# Patient Record
Sex: FEMALE | Race: WHITE | NOT HISPANIC OR LATINO | URBAN - METROPOLITAN AREA
[De-identification: names, ages, dates, MRNs, and addresses within clinical notes are randomized per-mention and may not be internally consistent; named-entity substitution may affect disease eponyms.]

---

## 2019-05-26 ENCOUNTER — INPATIENT (INPATIENT)
Facility: HOSPITAL | Age: 84
LOS: 8 days | Discharge: NURSING, REHAB OR LONG-TERM CARE | End: 2019-06-04
Attending: FAMILY MEDICINE | Admitting: FAMILY MEDICINE
Payer: MEDICARE

## 2019-05-26 VITALS
HEIGHT: 63 IN | SYSTOLIC BLOOD PRESSURE: 126 MMHG | WEIGHT: 100.09 LBS | RESPIRATION RATE: 19 BRPM | DIASTOLIC BLOOD PRESSURE: 72 MMHG | HEART RATE: 119 BPM | OXYGEN SATURATION: 100 %

## 2019-05-26 LAB
ALBUMIN SERPL ELPH-MCNC: 3.8 G/DL — SIGNIFICANT CHANGE UP (ref 3.3–5)
ALP SERPL-CCNC: 69 U/L — SIGNIFICANT CHANGE UP (ref 40–120)
ALT FLD-CCNC: 13 U/L — SIGNIFICANT CHANGE UP (ref 12–78)
ANION GAP SERPL CALC-SCNC: 9 MMOL/L — SIGNIFICANT CHANGE UP (ref 5–17)
APTT BLD: 30 SEC — SIGNIFICANT CHANGE UP (ref 27.5–36.3)
AST SERPL-CCNC: 22 U/L — SIGNIFICANT CHANGE UP (ref 15–37)
BASOPHILS # BLD AUTO: 0.08 K/UL — SIGNIFICANT CHANGE UP (ref 0–0.2)
BASOPHILS NFR BLD AUTO: 1.2 % — SIGNIFICANT CHANGE UP (ref 0–2)
BILIRUB SERPL-MCNC: 0.6 MG/DL — SIGNIFICANT CHANGE UP (ref 0.2–1.2)
BUN SERPL-MCNC: 24 MG/DL — HIGH (ref 7–23)
CALCIUM SERPL-MCNC: 9.6 MG/DL — SIGNIFICANT CHANGE UP (ref 8.5–10.1)
CHLORIDE SERPL-SCNC: 102 MMOL/L — SIGNIFICANT CHANGE UP (ref 96–108)
CO2 SERPL-SCNC: 27 MMOL/L — SIGNIFICANT CHANGE UP (ref 22–31)
CREAT SERPL-MCNC: 1.23 MG/DL — SIGNIFICANT CHANGE UP (ref 0.5–1.3)
EOSINOPHIL # BLD AUTO: 0.11 K/UL — SIGNIFICANT CHANGE UP (ref 0–0.5)
EOSINOPHIL NFR BLD AUTO: 1.6 % — SIGNIFICANT CHANGE UP (ref 0–6)
GLUCOSE SERPL-MCNC: 121 MG/DL — HIGH (ref 70–99)
HCT VFR BLD CALC: 38.1 % — SIGNIFICANT CHANGE UP (ref 34.5–45)
HGB BLD-MCNC: 13 G/DL — SIGNIFICANT CHANGE UP (ref 11.5–15.5)
IMM GRANULOCYTES NFR BLD AUTO: 0.4 % — SIGNIFICANT CHANGE UP (ref 0–1.5)
INR BLD: 1.05 RATIO — SIGNIFICANT CHANGE UP (ref 0.88–1.16)
LACTATE SERPL-SCNC: 1.1 MMOL/L — SIGNIFICANT CHANGE UP (ref 0.7–2)
LYMPHOCYTES # BLD AUTO: 2.67 K/UL — SIGNIFICANT CHANGE UP (ref 1–3.3)
LYMPHOCYTES # BLD AUTO: 39 % — SIGNIFICANT CHANGE UP (ref 13–44)
MCHC RBC-ENTMCNC: 31.5 PG — SIGNIFICANT CHANGE UP (ref 27–34)
MCHC RBC-ENTMCNC: 34.1 GM/DL — SIGNIFICANT CHANGE UP (ref 32–36)
MCV RBC AUTO: 92.3 FL — SIGNIFICANT CHANGE UP (ref 80–100)
MONOCYTES # BLD AUTO: 0.48 K/UL — SIGNIFICANT CHANGE UP (ref 0–0.9)
MONOCYTES NFR BLD AUTO: 7 % — SIGNIFICANT CHANGE UP (ref 2–14)
NEUTROPHILS # BLD AUTO: 3.47 K/UL — SIGNIFICANT CHANGE UP (ref 1.8–7.4)
NEUTROPHILS NFR BLD AUTO: 50.8 % — SIGNIFICANT CHANGE UP (ref 43–77)
PLATELET # BLD AUTO: 219 K/UL — SIGNIFICANT CHANGE UP (ref 150–400)
POTASSIUM SERPL-MCNC: 3.8 MMOL/L — SIGNIFICANT CHANGE UP (ref 3.5–5.3)
POTASSIUM SERPL-SCNC: 3.8 MMOL/L — SIGNIFICANT CHANGE UP (ref 3.5–5.3)
PROT SERPL-MCNC: 7.9 GM/DL — SIGNIFICANT CHANGE UP (ref 6–8.3)
PROTHROM AB SERPL-ACNC: 11.7 SEC — SIGNIFICANT CHANGE UP (ref 10–12.9)
RBC # BLD: 4.13 M/UL — SIGNIFICANT CHANGE UP (ref 3.8–5.2)
RBC # FLD: 14.1 % — SIGNIFICANT CHANGE UP (ref 10.3–14.5)
SODIUM SERPL-SCNC: 138 MMOL/L — SIGNIFICANT CHANGE UP (ref 135–145)
WBC # BLD: 6.84 K/UL — SIGNIFICANT CHANGE UP (ref 3.8–10.5)
WBC # FLD AUTO: 6.84 K/UL — SIGNIFICANT CHANGE UP (ref 3.8–10.5)

## 2019-05-26 PROCEDURE — 99285 EMERGENCY DEPT VISIT HI MDM: CPT

## 2019-05-26 PROCEDURE — 71045 X-RAY EXAM CHEST 1 VIEW: CPT | Mod: 26

## 2019-05-26 PROCEDURE — 71250 CT THORAX DX C-: CPT | Mod: 26

## 2019-05-26 PROCEDURE — 93010 ELECTROCARDIOGRAM REPORT: CPT

## 2019-05-26 RX ORDER — LANOLIN ALCOHOL/MO/W.PET/CERES
5 CREAM (GRAM) TOPICAL AT BEDTIME
Refills: 0 | Status: DISCONTINUED | OUTPATIENT
Start: 2019-05-26 | End: 2019-06-04

## 2019-05-26 RX ORDER — ONDANSETRON 8 MG/1
4 TABLET, FILM COATED ORAL EVERY 6 HOURS
Refills: 0 | Status: DISCONTINUED | OUTPATIENT
Start: 2019-05-26 | End: 2019-06-04

## 2019-05-26 RX ORDER — IPRATROPIUM/ALBUTEROL SULFATE 18-103MCG
3 AEROSOL WITH ADAPTER (GRAM) INHALATION EVERY 6 HOURS
Refills: 0 | Status: DISCONTINUED | OUTPATIENT
Start: 2019-05-26 | End: 2019-05-31

## 2019-05-26 RX ORDER — DOCUSATE SODIUM 100 MG
100 CAPSULE ORAL THREE TIMES A DAY
Refills: 0 | Status: DISCONTINUED | OUTPATIENT
Start: 2019-05-26 | End: 2019-05-27

## 2019-05-26 RX ORDER — IPRATROPIUM/ALBUTEROL SULFATE 18-103MCG
3 AEROSOL WITH ADAPTER (GRAM) INHALATION ONCE
Refills: 0 | Status: COMPLETED | OUTPATIENT
Start: 2019-05-26 | End: 2019-05-26

## 2019-05-26 RX ORDER — CEFTRIAXONE 500 MG/1
1000 INJECTION, POWDER, FOR SOLUTION INTRAMUSCULAR; INTRAVENOUS ONCE
Refills: 0 | Status: COMPLETED | OUTPATIENT
Start: 2019-05-26 | End: 2019-05-26

## 2019-05-26 RX ORDER — ENOXAPARIN SODIUM 100 MG/ML
30 INJECTION SUBCUTANEOUS EVERY 24 HOURS
Refills: 0 | Status: DISCONTINUED | OUTPATIENT
Start: 2019-05-26 | End: 2019-06-04

## 2019-05-26 RX ORDER — IPRATROPIUM/ALBUTEROL SULFATE 18-103MCG
3 AEROSOL WITH ADAPTER (GRAM) INHALATION ONCE
Refills: 0 | Status: DISCONTINUED | OUTPATIENT
Start: 2019-05-26 | End: 2019-05-26

## 2019-05-26 RX ORDER — AZITHROMYCIN 500 MG/1
500 TABLET, FILM COATED ORAL ONCE
Refills: 0 | Status: COMPLETED | OUTPATIENT
Start: 2019-05-26 | End: 2019-05-26

## 2019-05-26 RX ORDER — BUDESONIDE, MICRONIZED 100 %
0.5 POWDER (GRAM) MISCELLANEOUS
Refills: 0 | Status: DISCONTINUED | OUTPATIENT
Start: 2019-05-26 | End: 2019-06-03

## 2019-05-26 RX ADMIN — Medication 80 MILLIGRAM(S): at 13:46

## 2019-05-26 RX ADMIN — Medication 3 MILLILITER(S): at 13:46

## 2019-05-26 RX ADMIN — Medication 40 MILLIGRAM(S): at 23:18

## 2019-05-26 RX ADMIN — CEFTRIAXONE 1000 MILLIGRAM(S): 500 INJECTION, POWDER, FOR SOLUTION INTRAMUSCULAR; INTRAVENOUS at 13:46

## 2019-05-26 RX ADMIN — ENOXAPARIN SODIUM 30 MILLIGRAM(S): 100 INJECTION SUBCUTANEOUS at 17:10

## 2019-05-26 RX ADMIN — Medication 40 MILLIGRAM(S): at 17:11

## 2019-05-26 RX ADMIN — Medication 0.5 MILLIGRAM(S): at 20:48

## 2019-05-26 RX ADMIN — AZITHROMYCIN 255 MILLIGRAM(S): 500 TABLET, FILM COATED ORAL at 14:15

## 2019-05-26 RX ADMIN — Medication 3 MILLILITER(S): at 20:48

## 2019-05-26 NOTE — H&P ADULT - NSICDXPASTMEDICALHX_GEN_ALL_CORE_FT
PAST MEDICAL HISTORY:  Benign essential tremor     COPD (chronic obstructive pulmonary disease)     Glaucoma     Hypertension     Osteoarthritis

## 2019-05-26 NOTE — ED PROVIDER NOTE - OBJECTIVE STATEMENT
89 y/o F PMHx of COPD, HTN, former smoker presents to ED BIBA from home worsening SOB with frequent coughing. No home O2. Associated generalized weakness, exacerbated by mild exertion. No associated f/c, CP, n/v, abd pain, loss of appetite, orthopnea. Past h/o ankle swelling, not recently. Pt on Lasix. Allergic to sulfa. PCP: Love [facility].

## 2019-05-26 NOTE — ED PROVIDER NOTE - CLINICAL SUMMARY MEDICAL DECISION MAKING FREE TEXT BOX
Elderly female former smoker + COPD BIBA with worsening cough, SOB. Abnormal lung exam. Plan rectal temp; CXR; labs including blood cultures, lactate, Coags; DuoNebs; IV steroids. CXR suggestive of right lower lobe infiltrate, IV abx for CAP order. Pt hypoxic upon ED arrival, admit to hospitalist with CT chest non-con.

## 2019-05-26 NOTE — H&P ADULT - HISTORY OF PRESENT ILLNESS
89 y/o F PMHx of COPD, HTN, former smoker presents to ED BIBA from home worsening SOB with frequent coughing. 89 y/o F PMHx of COPD, HTN, former smoker presents to ED BIBA from home worsening SOB with frequent coughing.  Patient states she was diagnosed with copd many years ago, but she has not had any flare up for many years and has been stable. She states she has a cough , non productive that will not linda.  Patient was hypoxic in ER mid 80s on arrival. She was given supplemntal neb, steroid, oxygen in ER which releived some of her symptoms. She denies any fevers, chills, n/v/d.

## 2019-05-26 NOTE — H&P ADULT - NSHPPHYSICALEXAM_GEN_ALL_CORE
Vital Signs Last 24 Hrs  T(C): 36.7 (26 May 2019 21:19), Max: 36.9 (26 May 2019 14:15)  T(F): 98.1 (26 May 2019 21:19), Max: 98.5 (26 May 2019 14:32)  HR: 98 (26 May 2019 21:19) (76 - 119)  BP: 117/48 (26 May 2019 21:19) (117/48 - 186/72)  BP(mean): --  RR: 18 (26 May 2019 21:19) (16 - 21)  SpO2: 95% (26 May 2019 21:19) (89% - 100%)    HEENT:   pupils equal and reactive, EOMI, no oropharyngeal lesions, erythema, exudates, oral thrush    NECK:   supple, no carotid bruits, no palpable lymph nodes, no thyromegaly    CV:  +S1, +S2, regular, no murmurs or rubs    RESP:   lungs clear to auscultation bilaterally, no wheezing, rales, rhonchi, good air entry bilaterally    BREAST:  not examined    GI:  abdomen soft, non-tender, non-distended, normal BS, no bruits, no abdominal masses, no palpable masses    RECTAL:  not examined    :  not examined    MSK:   normal muscle tone, no atrophy, no rigidity, no contractions    EXT:   no clubbing, no cyanosis, no edema, no calf pain, swelling or erythema    VASCULAR:  pulses equal and symmetric in the upper and lower extremities    NEURO:  AAOX3, no focal neurological deficits, follows all commands, able to move extremities spontaneously    SKIN:  no ulcers, lesions or rashes

## 2019-05-26 NOTE — ED PROVIDER NOTE - CONSTITUTIONAL, MLM
normal... Elderly white female, alert, mild respiratory discomfort, no sentence shortening, mildly ill appearing but non-toxic.

## 2019-05-26 NOTE — ED PROVIDER NOTE - PMH
Benign essential tremor    COPD (chronic obstructive pulmonary disease)    Glaucoma    Hypertension    Osteoarthritis

## 2019-05-26 NOTE — ED ADULT NURSE NOTE - OBJECTIVE STATEMENT
Pt complains of worsening SOB and cough x 2 weeks, denies fevers.  EKG done on arrival.   Cardiac and VS monitoring initiated.  20g PIV placed in LAC.  Moist nonproductive cougn.

## 2019-05-26 NOTE — H&P ADULT - NSICDXPASTSURGICALHX_GEN_ALL_CORE_FT
PAST SURGICAL HISTORY:  Ankle injury     Dislocated intraocular lens right eye    Glaucoma filtering bleb of both eyes

## 2019-05-26 NOTE — H&P ADULT - NSHPLABSRESULTS_GEN_ALL_CORE
26 May 2019 11:16    138    |  102    |  24     ----------------------------<  121    3.8     |  27     |  1.23     Ca    9.6        26 May 2019 11:16    TPro  7.9    /  Alb  3.8    /  TBili  0.6    /  DBili  x      /  AST  22     /  ALT  13     /  AlkPhos  69     26 May 2019 11:16  LIVER FUNCTIONS - ( 26 May 2019 11:16 )  Alb: 3.8 g/dL / Pro: 7.9 gm/dL / ALK PHOS: 69 U/L / ALT: 13 U/L / AST: 22 U/L / GGT: x         PT/INR - ( 26 May 2019 11:16 )   PT: 11.7 sec;   INR: 1.05 ratio         PTT - ( 26 May 2019 11:16 )  PTT:30.0 secCBC Full  -  ( 26 May 2019 11:16 )  WBC Count : 6.84 K/uL  Hemoglobin : 13.0 g/dL  Hematocrit : 38.1 %  Platelet Count - Automated : 219 K/uL  Mean Cell Volume : 92.3 fl  Mean Cell Hemoglobin : 31.5 pg  Mean Cell Hemoglobin Concentration : 34.1 gm/dL  Auto Neutrophil # : 3.47 K/uL  Auto Lymphocyte # : 2.67 K/uL  Auto Monocyte # : 0.48 K/uL  Auto Eosinophil # : 0.11 K/uL  Auto Basophil # : 0.08 K/uL  Auto Neutrophil % : 50.8 %  Auto Lymphocyte % : 39.0 %  Auto Monocyte % : 7.0 %  Auto Eosinophil % : 1.6 %  Auto Basophil % : 1.2 %

## 2019-05-26 NOTE — ED ADULT NURSE NOTE - NSIMPLEMENTINTERV_GEN_ALL_ED
Implemented All Fall with Harm Risk Interventions:  [place] to call system. Call bell, personal items and telephone within reach. Instruct patient to call for assistance. Room bathroom lighting operational. Non-slip footwear when patient is off stretcher. Physically safe environment: no spills, clutter or unnecessary equipment. Stretcher in lowest position, wheels locked, appropriate side rails in place. Provide visual cue, wrist band, yellow gown, etc. Monitor gait and stability. Monitor for mental status changes and reorient to person, place, and time. Review medications for side effects contributing to fall risk. Reinforce activity limits and safety measures with patient and family. Provide visual clues: red socks.

## 2019-05-26 NOTE — ED PROVIDER NOTE - CARE PLAN
Principal Discharge DX:	CAP (community acquired pneumonia)  Secondary Diagnosis:	COPD exacerbation  Secondary Diagnosis:	Hypoxia

## 2019-05-26 NOTE — H&P ADULT - ASSESSMENT
89 y/o F PMHx of COPD, HTN, former smoker presents to ED BIBA from home worsening SOB with frequent coughing.      Found to be in acute respiratory distress and acute hypoxic resp failure.     Admit for acute exacerbation of copd with acute hypoxic resp failure  -start solumederol 40 q6h  -start duonebs every 6  -pulmicort    DVT prophy- sc heparin 89 y/o F PMHx of COPD, HTN, former smoker presents to ED BIBA from home worsening SOB with frequent coughing.  Patient states she was diagnosed with copd many years ago, but she has not had any flare up for many years and has been stable. She states she has a cough , non productive that will not linda.  Patient was hypoxic in ER mid 80s on arrival. She was given supplemntal neb, steroid, oxygen in ER which releived some of her symptoms. She denies any fevers, chills, n/v/d.    Found to be in acute respiratory distress and acute hypoxic resp failure.     Admit for acute exacerbation of copd with acute hypoxic resp failure  -start solumederol 40 q6h  -start duonebs every 6  -pulmicort    DVT prophy- sc heparin

## 2019-05-27 LAB
ANION GAP SERPL CALC-SCNC: 12 MMOL/L — SIGNIFICANT CHANGE UP (ref 5–17)
BASOPHILS # BLD AUTO: 0.01 K/UL — SIGNIFICANT CHANGE UP (ref 0–0.2)
BASOPHILS NFR BLD AUTO: 0.2 % — SIGNIFICANT CHANGE UP (ref 0–2)
BUN SERPL-MCNC: 27 MG/DL — HIGH (ref 7–23)
CALCIUM SERPL-MCNC: 9.1 MG/DL — SIGNIFICANT CHANGE UP (ref 8.5–10.1)
CHLORIDE SERPL-SCNC: 103 MMOL/L — SIGNIFICANT CHANGE UP (ref 96–108)
CO2 SERPL-SCNC: 23 MMOL/L — SIGNIFICANT CHANGE UP (ref 22–31)
CREAT SERPL-MCNC: 1.12 MG/DL — SIGNIFICANT CHANGE UP (ref 0.5–1.3)
EOSINOPHIL # BLD AUTO: 0 K/UL — SIGNIFICANT CHANGE UP (ref 0–0.5)
EOSINOPHIL NFR BLD AUTO: 0 % — SIGNIFICANT CHANGE UP (ref 0–6)
GLUCOSE SERPL-MCNC: 183 MG/DL — HIGH (ref 70–99)
HCT VFR BLD CALC: 37 % — SIGNIFICANT CHANGE UP (ref 34.5–45)
HGB BLD-MCNC: 12.4 G/DL — SIGNIFICANT CHANGE UP (ref 11.5–15.5)
IMM GRANULOCYTES NFR BLD AUTO: 0.6 % — SIGNIFICANT CHANGE UP (ref 0–1.5)
LYMPHOCYTES # BLD AUTO: 1.3 K/UL — SIGNIFICANT CHANGE UP (ref 1–3.3)
LYMPHOCYTES # BLD AUTO: 24.8 % — SIGNIFICANT CHANGE UP (ref 13–44)
MCHC RBC-ENTMCNC: 31 PG — SIGNIFICANT CHANGE UP (ref 27–34)
MCHC RBC-ENTMCNC: 33.5 GM/DL — SIGNIFICANT CHANGE UP (ref 32–36)
MCV RBC AUTO: 92.5 FL — SIGNIFICANT CHANGE UP (ref 80–100)
MONOCYTES # BLD AUTO: 0.06 K/UL — SIGNIFICANT CHANGE UP (ref 0–0.9)
MONOCYTES NFR BLD AUTO: 1.1 % — LOW (ref 2–14)
NEUTROPHILS # BLD AUTO: 3.85 K/UL — SIGNIFICANT CHANGE UP (ref 1.8–7.4)
NEUTROPHILS NFR BLD AUTO: 73.3 % — SIGNIFICANT CHANGE UP (ref 43–77)
PLATELET # BLD AUTO: 201 K/UL — SIGNIFICANT CHANGE UP (ref 150–400)
POTASSIUM SERPL-MCNC: 3.6 MMOL/L — SIGNIFICANT CHANGE UP (ref 3.5–5.3)
POTASSIUM SERPL-SCNC: 3.6 MMOL/L — SIGNIFICANT CHANGE UP (ref 3.5–5.3)
RBC # BLD: 4 M/UL — SIGNIFICANT CHANGE UP (ref 3.8–5.2)
RBC # FLD: 14.1 % — SIGNIFICANT CHANGE UP (ref 10.3–14.5)
SODIUM SERPL-SCNC: 138 MMOL/L — SIGNIFICANT CHANGE UP (ref 135–145)
WBC # BLD: 5.25 K/UL — SIGNIFICANT CHANGE UP (ref 3.8–10.5)
WBC # FLD AUTO: 5.25 K/UL — SIGNIFICANT CHANGE UP (ref 3.8–10.5)

## 2019-05-27 RX ORDER — IPRATROPIUM/ALBUTEROL SULFATE 18-103MCG
3 AEROSOL WITH ADAPTER (GRAM) INHALATION ONCE
Refills: 0 | Status: COMPLETED | OUTPATIENT
Start: 2019-05-27 | End: 2019-05-27

## 2019-05-27 RX ADMIN — Medication 60 MILLIGRAM(S): at 17:57

## 2019-05-27 RX ADMIN — Medication 60 MILLIGRAM(S): at 23:32

## 2019-05-27 RX ADMIN — Medication 40 MILLIGRAM(S): at 05:13

## 2019-05-27 RX ADMIN — ENOXAPARIN SODIUM 30 MILLIGRAM(S): 100 INJECTION SUBCUTANEOUS at 17:57

## 2019-05-27 RX ADMIN — Medication 3 MILLILITER(S): at 01:50

## 2019-05-27 RX ADMIN — Medication 100 MILLIGRAM(S): at 05:13

## 2019-05-27 RX ADMIN — Medication 3 MILLILITER(S): at 14:08

## 2019-05-27 RX ADMIN — Medication 0.5 MILLIGRAM(S): at 08:03

## 2019-05-27 RX ADMIN — Medication 0.5 MILLIGRAM(S): at 21:02

## 2019-05-27 RX ADMIN — Medication 3 MILLILITER(S): at 08:03

## 2019-05-27 RX ADMIN — Medication 3 MILLILITER(S): at 05:40

## 2019-05-27 RX ADMIN — Medication 60 MILLIGRAM(S): at 12:04

## 2019-05-27 RX ADMIN — Medication 3 MILLILITER(S): at 21:02

## 2019-05-28 RX ADMIN — Medication 3 MILLILITER(S): at 13:44

## 2019-05-28 RX ADMIN — Medication 3 MILLILITER(S): at 09:33

## 2019-05-28 RX ADMIN — Medication 40 MILLIGRAM(S): at 21:38

## 2019-05-28 RX ADMIN — Medication 0.5 MILLIGRAM(S): at 20:10

## 2019-05-28 RX ADMIN — Medication 60 MILLIGRAM(S): at 05:40

## 2019-05-28 RX ADMIN — Medication 0.5 MILLIGRAM(S): at 09:33

## 2019-05-28 RX ADMIN — ENOXAPARIN SODIUM 30 MILLIGRAM(S): 100 INJECTION SUBCUTANEOUS at 17:24

## 2019-05-28 RX ADMIN — Medication 3 MILLILITER(S): at 20:10

## 2019-05-28 RX ADMIN — Medication 60 MILLIGRAM(S): at 12:08

## 2019-05-29 RX ADMIN — Medication 200 MILLIGRAM(S): at 05:38

## 2019-05-29 RX ADMIN — Medication 200 MILLIGRAM(S): at 23:58

## 2019-05-29 RX ADMIN — Medication 40 MILLIGRAM(S): at 05:38

## 2019-05-29 RX ADMIN — ENOXAPARIN SODIUM 30 MILLIGRAM(S): 100 INJECTION SUBCUTANEOUS at 17:56

## 2019-05-29 RX ADMIN — Medication 200 MILLIGRAM(S): at 17:56

## 2019-05-29 RX ADMIN — Medication 0.5 MILLIGRAM(S): at 20:14

## 2019-05-29 RX ADMIN — Medication 40 MILLIGRAM(S): at 13:53

## 2019-05-29 RX ADMIN — Medication 3 MILLILITER(S): at 20:15

## 2019-05-29 RX ADMIN — Medication 200 MILLIGRAM(S): at 11:57

## 2019-05-29 NOTE — PHYSICAL THERAPY INITIAL EVALUATION ADULT - ADDITIONAL COMMENTS
pt has few steps to enter home with HR, she has never used an assistive device and still drives.She has a RW and a cane in home issued to  prior to his death in 2003

## 2019-05-29 NOTE — PHYSICAL THERAPY INITIAL EVALUATION ADULT - GENERAL OBSERVATIONS, REHAB EVAL
pt resting supine in bed with nasal o2 3L/min ,reports R leg treated for weeks with UNNA boot to heal medial distal ulcer,now using moisturizing lotion to area ; awake,alert,,Ox3 ,hands with ruborous color,,arthritic changes DIP joints B hands ,joints jt with finger flexion during gripping skills

## 2019-05-29 NOTE — PHYSICAL THERAPY INITIAL EVALUATION ADULT - PATIENT PROFILE REVIEW, REHAB EVAL
former smoker,diagnosed with COPD in remote past without h/o flare ups/yes yes/former smoker, diagnosed with COPD in remote past without h/o flare ups

## 2019-05-29 NOTE — DIETITIAN INITIAL EVALUATION ADULT. - OTHER INFO
Pt seen for Low BMI. Pt states she has maintained her UBW since she was  (100lbs). Pt states she is around 92-95, with no sudden changes. Pt pmhx noted for glaucoma, Osteoarthritis, Essential tremor, COPD, HTN. Pt states she has a very good appetite and eats three meals a day. Pt states few days before admission appetite was low, but overall has been good. Pt denies chewing and swallowing difficulty. Denies n/v/d/c. Pt jose 19, no edema, scab on right shin. Pt overall in good nutrition status. Pt states she takes vitamin d at home. RTecommend c/w current diet and plan. Will monitor pt as needed. of note pt classified as underweight.

## 2019-05-29 NOTE — PROVIDER CONTACT NOTE (OTHER) - SITUATION
Called office spoke to Karthik.  will be made aware of consult
Called service spoke to Jenny for admit courtesy call for PCP.

## 2019-05-29 NOTE — PHYSICAL THERAPY INITIAL EVALUATION ADULT - DIAGNOSIS, PT EVAL
acute hypoxic respiratory failure,acute exacerbation of COPD acute hypoxic respiratory failure, acute exacerbation of COPD, arthritis B hands

## 2019-05-29 NOTE — DIETITIAN INITIAL EVALUATION ADULT. - PERTINENT MEDS FT
MEDICATIONS  (STANDING):  ALBUTerol/ipratropium for Nebulization 3 milliLiter(s) Nebulizer every 6 hours  buDESOnide   0.5 milliGRAM(s) Respule 0.5 milliGRAM(s) Inhalation two times a day  enoxaparin Injectable 30 milliGRAM(s) SubCutaneous every 24 hours  guaiFENesin    Syrup 200 milliGRAM(s) Oral every 6 hours  methylPREDNISolone sodium succinate Injectable 40 milliGRAM(s) IV Push every 8 hours    MEDICATIONS  (PRN):  melatonin 5 milliGRAM(s) Oral at bedtime PRN Sleep  ondansetron Injectable 4 milliGRAM(s) IV Push every 6 hours PRN Nausea

## 2019-05-29 NOTE — PHYSICAL THERAPY INITIAL EVALUATION ADULT - CRITERIA FOR SKILLED THERAPEUTIC INTERVENTIONS
functional limitations in following categories/rehab potential/therapy frequency/impairments found/anticipated discharge recommendation/risk reduction/prevention/predicted duration of therapy intervention/anticipated equipment needs at discharge

## 2019-05-29 NOTE — CHART NOTE - NSCHARTNOTEFT_GEN_A_CORE
Upon Nutritional Assessment by the Registered Dietitian your patient was determined to meet criteria / has evidence of the following diagnosis/diagnoses:          [ ]  Mild Protein Calorie Malnutrition        [ ]  Moderate Protein Calorie Malnutrition        [ ] Severe Protein Calorie Malnutrition        [ ] Unspecified Protein Calorie Malnutrition        [x] Underweight / BMI <19        [ ] Morbid Obesity / BMI > 40      Findings as based on:  •  Comprehensive nutrition assessment and consultation  •  Calorie counts (nutrient intake analysis)  •  Food acceptance and intake status from observations by staff  •  Follow up  •  Patient education  •  Intervention secondary to interdisciplinary rounds  •   concerns      Treatment:    The following diet has been recommended:  C/w Current Nutrition Care Plan      PROVIDER Section:     By signing this assessment you are acknowledging and agree with the diagnosis/diagnoses assigned by the Registered Dietitian    Comments:

## 2019-05-29 NOTE — PHYSICAL THERAPY INITIAL EVALUATION ADULT - PERTINENT HX OF CURRENT PROBLEM, REHAB EVAL
increased shortness of breath,chronic non-productive cough that won't linda,hypoxic to mid 80's on room air

## 2019-05-30 RX ORDER — AZITHROMYCIN 500 MG/1
500 TABLET, FILM COATED ORAL DAILY
Refills: 0 | Status: DISCONTINUED | OUTPATIENT
Start: 2019-05-30 | End: 2019-06-04

## 2019-05-30 RX ADMIN — ENOXAPARIN SODIUM 30 MILLIGRAM(S): 100 INJECTION SUBCUTANEOUS at 18:51

## 2019-05-30 RX ADMIN — Medication 200 MILLIGRAM(S): at 10:01

## 2019-05-30 RX ADMIN — Medication 3 MILLILITER(S): at 20:19

## 2019-05-30 RX ADMIN — AZITHROMYCIN 500 MILLIGRAM(S): 500 TABLET, FILM COATED ORAL at 10:00

## 2019-05-30 RX ADMIN — Medication 40 MILLIGRAM(S): at 05:45

## 2019-05-30 RX ADMIN — Medication 200 MILLIGRAM(S): at 18:34

## 2019-05-30 RX ADMIN — Medication 40 MILLIGRAM(S): at 18:33

## 2019-05-30 RX ADMIN — Medication 3 MILLILITER(S): at 14:03

## 2019-05-30 RX ADMIN — Medication 0.5 MILLIGRAM(S): at 20:19

## 2019-05-30 RX ADMIN — Medication 3 MILLILITER(S): at 08:28

## 2019-05-30 RX ADMIN — Medication 0.5 MILLIGRAM(S): at 08:28

## 2019-05-30 NOTE — CONSULT NOTE ADULT - ASSESSMENT
Patient is seen and full consult dictated     - copd with symptoms of exacerbation   - hypoxia secondary to copd exacerbation   - emphysema noted in the ct scan of chest   - No gross pneumonia noted in the ct scan of the chest   - mild hyperglycemia with the steroids   - history of hiatal and diaphragmatic hernia     PLAN     - continue with the solumedrol and taper down to po prednisone as the patient improves from tomorrow   - add azithromycin as an immuno modulatory agent 500 mg po daily for 3 days  with the complicated  copd and advanced age that required hospitalization   - continue with the Pulmicort during the hospital stay . and use of advair and spiriva as an out patient her baseline medications   - check the sat on the room air and with the ambulation for the need of the home 02 therapy   - symptomatic relief for the cough   - monitor BGM and  hyperglycemia likely related with the steroids

## 2019-05-31 ENCOUNTER — TRANSCRIPTION ENCOUNTER (OUTPATIENT)
Age: 84
End: 2019-05-31

## 2019-05-31 LAB
CULTURE RESULTS: SIGNIFICANT CHANGE UP
CULTURE RESULTS: SIGNIFICANT CHANGE UP
SPECIMEN SOURCE: SIGNIFICANT CHANGE UP
SPECIMEN SOURCE: SIGNIFICANT CHANGE UP

## 2019-05-31 PROCEDURE — 93010 ELECTROCARDIOGRAM REPORT: CPT

## 2019-05-31 PROCEDURE — 71045 X-RAY EXAM CHEST 1 VIEW: CPT | Mod: 26

## 2019-05-31 RX ORDER — ALBUTEROL 90 UG/1
2.5 AEROSOL, METERED ORAL EVERY 6 HOURS
Refills: 0 | Status: DISCONTINUED | OUTPATIENT
Start: 2019-05-31 | End: 2019-05-31

## 2019-05-31 RX ORDER — ACETYLCYSTEINE 200 MG/ML
4 VIAL (ML) MISCELLANEOUS EVERY 6 HOURS
Refills: 0 | Status: DISCONTINUED | OUTPATIENT
Start: 2019-05-31 | End: 2019-05-31

## 2019-05-31 RX ORDER — IPRATROPIUM BROMIDE 0.2 MG/ML
500 SOLUTION, NON-ORAL INHALATION EVERY 6 HOURS
Refills: 0 | Status: DISCONTINUED | OUTPATIENT
Start: 2019-05-31 | End: 2019-06-04

## 2019-05-31 RX ORDER — METOPROLOL TARTRATE 50 MG
50 TABLET ORAL ONCE
Refills: 0 | Status: COMPLETED | OUTPATIENT
Start: 2019-05-31 | End: 2019-05-31

## 2019-05-31 RX ORDER — HYDRALAZINE HCL 50 MG
5 TABLET ORAL EVERY 6 HOURS
Refills: 0 | Status: DISCONTINUED | OUTPATIENT
Start: 2019-05-31 | End: 2019-06-01

## 2019-05-31 RX ADMIN — AZITHROMYCIN 500 MILLIGRAM(S): 500 TABLET, FILM COATED ORAL at 12:46

## 2019-05-31 RX ADMIN — Medication 0.5 MILLIGRAM(S): at 09:05

## 2019-05-31 RX ADMIN — ALBUTEROL 2.5 MILLIGRAM(S): 90 AEROSOL, METERED ORAL at 16:20

## 2019-05-31 RX ADMIN — Medication 4 MILLILITER(S): at 16:20

## 2019-05-31 RX ADMIN — ENOXAPARIN SODIUM 30 MILLIGRAM(S): 100 INJECTION SUBCUTANEOUS at 18:27

## 2019-05-31 RX ADMIN — Medication 500 MICROGRAM(S): at 20:42

## 2019-05-31 RX ADMIN — Medication 3 MILLILITER(S): at 09:04

## 2019-05-31 RX ADMIN — Medication 0.5 MILLIGRAM(S): at 20:42

## 2019-05-31 RX ADMIN — Medication 50 MILLIGRAM(S): at 16:58

## 2019-05-31 RX ADMIN — Medication 5 MILLIGRAM(S): at 22:16

## 2019-05-31 RX ADMIN — Medication 30 MILLIGRAM(S): at 22:16

## 2019-05-31 RX ADMIN — Medication 40 MILLIGRAM(S): at 05:51

## 2019-06-01 LAB
ANION GAP SERPL CALC-SCNC: 9 MMOL/L — SIGNIFICANT CHANGE UP (ref 5–17)
BUN SERPL-MCNC: 37 MG/DL — HIGH (ref 7–23)
CALCIUM SERPL-MCNC: 9.1 MG/DL — SIGNIFICANT CHANGE UP (ref 8.5–10.1)
CHLORIDE SERPL-SCNC: 105 MMOL/L — SIGNIFICANT CHANGE UP (ref 96–108)
CO2 SERPL-SCNC: 27 MMOL/L — SIGNIFICANT CHANGE UP (ref 22–31)
CREAT SERPL-MCNC: 1.07 MG/DL — SIGNIFICANT CHANGE UP (ref 0.5–1.3)
GLUCOSE SERPL-MCNC: 170 MG/DL — HIGH (ref 70–99)
POTASSIUM SERPL-MCNC: 4.4 MMOL/L — SIGNIFICANT CHANGE UP (ref 3.5–5.3)
POTASSIUM SERPL-SCNC: 4.4 MMOL/L — SIGNIFICANT CHANGE UP (ref 3.5–5.3)
SODIUM SERPL-SCNC: 141 MMOL/L — SIGNIFICANT CHANGE UP (ref 135–145)

## 2019-06-01 RX ORDER — HYDROCHLOROTHIAZIDE 25 MG
12.5 TABLET ORAL DAILY
Refills: 0 | Status: DISCONTINUED | OUTPATIENT
Start: 2019-06-01 | End: 2019-06-04

## 2019-06-01 RX ORDER — PROPRANOLOL HCL 160 MG
60 CAPSULE, EXTENDED RELEASE 24HR ORAL DAILY
Refills: 0 | Status: DISCONTINUED | OUTPATIENT
Start: 2019-06-01 | End: 2019-06-04

## 2019-06-01 RX ORDER — CHOLECALCIFEROL (VITAMIN D3) 125 MCG
4000 CAPSULE ORAL DAILY
Refills: 0 | Status: DISCONTINUED | OUTPATIENT
Start: 2019-06-01 | End: 2019-06-04

## 2019-06-01 RX ADMIN — Medication 0.5 MILLIGRAM(S): at 07:55

## 2019-06-01 RX ADMIN — Medication 0.5 MILLIGRAM(S): at 20:43

## 2019-06-01 RX ADMIN — Medication 60 MILLIGRAM(S): at 12:54

## 2019-06-01 RX ADMIN — AZITHROMYCIN 500 MILLIGRAM(S): 500 TABLET, FILM COATED ORAL at 12:53

## 2019-06-01 RX ADMIN — Medication 4000 UNIT(S): at 12:54

## 2019-06-01 RX ADMIN — Medication 500 MICROGRAM(S): at 13:42

## 2019-06-01 RX ADMIN — ENOXAPARIN SODIUM 30 MILLIGRAM(S): 100 INJECTION SUBCUTANEOUS at 17:17

## 2019-06-01 RX ADMIN — Medication 30 MILLIGRAM(S): at 17:17

## 2019-06-01 RX ADMIN — Medication 500 MICROGRAM(S): at 07:55

## 2019-06-01 RX ADMIN — Medication 500 MICROGRAM(S): at 20:43

## 2019-06-01 RX ADMIN — Medication 30 MILLIGRAM(S): at 05:03

## 2019-06-01 RX ADMIN — Medication 12.5 MILLIGRAM(S): at 17:31

## 2019-06-02 PROCEDURE — 71045 X-RAY EXAM CHEST 1 VIEW: CPT | Mod: 26

## 2019-06-02 RX ADMIN — Medication 30 MILLIGRAM(S): at 04:59

## 2019-06-02 RX ADMIN — Medication 500 MICROGRAM(S): at 13:18

## 2019-06-02 RX ADMIN — ENOXAPARIN SODIUM 30 MILLIGRAM(S): 100 INJECTION SUBCUTANEOUS at 18:48

## 2019-06-02 RX ADMIN — AZITHROMYCIN 500 MILLIGRAM(S): 500 TABLET, FILM COATED ORAL at 11:19

## 2019-06-02 RX ADMIN — Medication 0.5 MILLIGRAM(S): at 07:54

## 2019-06-02 RX ADMIN — Medication 500 MICROGRAM(S): at 07:54

## 2019-06-02 RX ADMIN — Medication 12.5 MILLIGRAM(S): at 18:48

## 2019-06-02 RX ADMIN — Medication 500 MICROGRAM(S): at 20:46

## 2019-06-02 RX ADMIN — Medication 30 MILLIGRAM(S): at 18:48

## 2019-06-02 RX ADMIN — Medication 60 MILLIGRAM(S): at 04:59

## 2019-06-02 RX ADMIN — Medication 0.5 MILLIGRAM(S): at 20:46

## 2019-06-03 LAB
D DIMER BLD IA.RAPID-MCNC: 197 NG/ML DDU — SIGNIFICANT CHANGE UP
RAPID RVP RESULT: SIGNIFICANT CHANGE UP

## 2019-06-03 PROCEDURE — 93970 EXTREMITY STUDY: CPT | Mod: 26

## 2019-06-03 RX ORDER — ACETYLCYSTEINE 200 MG/ML
3 VIAL (ML) MISCELLANEOUS THREE TIMES A DAY
Refills: 0 | Status: DISCONTINUED | OUTPATIENT
Start: 2019-06-03 | End: 2019-06-04

## 2019-06-03 RX ORDER — BUDESONIDE AND FORMOTEROL FUMARATE DIHYDRATE 160; 4.5 UG/1; UG/1
2 AEROSOL RESPIRATORY (INHALATION)
Refills: 0 | Status: DISCONTINUED | OUTPATIENT
Start: 2019-06-03 | End: 2019-06-04

## 2019-06-03 RX ADMIN — Medication 12.5 MILLIGRAM(S): at 04:52

## 2019-06-03 RX ADMIN — Medication 500 MICROGRAM(S): at 21:03

## 2019-06-03 RX ADMIN — Medication 500 MICROGRAM(S): at 16:25

## 2019-06-03 RX ADMIN — Medication 60 MILLIGRAM(S): at 04:52

## 2019-06-03 RX ADMIN — Medication 0.5 MILLIGRAM(S): at 21:02

## 2019-06-03 RX ADMIN — Medication 30 MILLIGRAM(S): at 04:51

## 2019-06-03 RX ADMIN — Medication 4000 UNIT(S): at 12:08

## 2019-06-03 RX ADMIN — ENOXAPARIN SODIUM 30 MILLIGRAM(S): 100 INJECTION SUBCUTANEOUS at 17:15

## 2019-06-03 RX ADMIN — Medication 600 MILLIGRAM(S): at 17:16

## 2019-06-03 RX ADMIN — Medication 500 MICROGRAM(S): at 11:13

## 2019-06-03 RX ADMIN — Medication 30 MILLIGRAM(S): at 17:15

## 2019-06-03 RX ADMIN — AZITHROMYCIN 500 MILLIGRAM(S): 500 TABLET, FILM COATED ORAL at 12:08

## 2019-06-04 ENCOUNTER — TRANSCRIPTION ENCOUNTER (OUTPATIENT)
Age: 84
End: 2019-06-04

## 2019-06-04 VITALS
TEMPERATURE: 98 F | DIASTOLIC BLOOD PRESSURE: 58 MMHG | RESPIRATION RATE: 18 BRPM | SYSTOLIC BLOOD PRESSURE: 156 MMHG | OXYGEN SATURATION: 99 % | HEART RATE: 85 BPM

## 2019-06-04 LAB
ANION GAP SERPL CALC-SCNC: 6 MMOL/L — SIGNIFICANT CHANGE UP (ref 5–17)
BUN SERPL-MCNC: 44 MG/DL — HIGH (ref 7–23)
CALCIUM SERPL-MCNC: 8.9 MG/DL — SIGNIFICANT CHANGE UP (ref 8.5–10.1)
CHLORIDE SERPL-SCNC: 99 MMOL/L — SIGNIFICANT CHANGE UP (ref 96–108)
CO2 SERPL-SCNC: 34 MMOL/L — HIGH (ref 22–31)
CREAT SERPL-MCNC: 1.14 MG/DL — SIGNIFICANT CHANGE UP (ref 0.5–1.3)
GLUCOSE SERPL-MCNC: 118 MG/DL — HIGH (ref 70–99)
HCT VFR BLD CALC: 40 % — SIGNIFICANT CHANGE UP (ref 34.5–45)
HGB BLD-MCNC: 13.2 G/DL — SIGNIFICANT CHANGE UP (ref 11.5–15.5)
MCHC RBC-ENTMCNC: 31.1 PG — SIGNIFICANT CHANGE UP (ref 27–34)
MCHC RBC-ENTMCNC: 33 GM/DL — SIGNIFICANT CHANGE UP (ref 32–36)
MCV RBC AUTO: 94.3 FL — SIGNIFICANT CHANGE UP (ref 80–100)
PLATELET # BLD AUTO: 212 K/UL — SIGNIFICANT CHANGE UP (ref 150–400)
POTASSIUM SERPL-MCNC: 4.2 MMOL/L — SIGNIFICANT CHANGE UP (ref 3.5–5.3)
POTASSIUM SERPL-SCNC: 4.2 MMOL/L — SIGNIFICANT CHANGE UP (ref 3.5–5.3)
RBC # BLD: 4.24 M/UL — SIGNIFICANT CHANGE UP (ref 3.8–5.2)
RBC # FLD: 14.1 % — SIGNIFICANT CHANGE UP (ref 10.3–14.5)
SODIUM SERPL-SCNC: 139 MMOL/L — SIGNIFICANT CHANGE UP (ref 135–145)
WBC # BLD: 11.42 K/UL — HIGH (ref 3.8–10.5)
WBC # FLD AUTO: 11.42 K/UL — HIGH (ref 3.8–10.5)

## 2019-06-04 RX ORDER — IPRATROPIUM/ALBUTEROL SULFATE 18-103MCG
3 AEROSOL WITH ADAPTER (GRAM) INHALATION
Qty: 0 | Refills: 0 | DISCHARGE
Start: 2019-06-04

## 2019-06-04 RX ORDER — IPRATROPIUM/ALBUTEROL SULFATE 18-103MCG
3 AEROSOL WITH ADAPTER (GRAM) INHALATION EVERY 6 HOURS
Refills: 0 | Status: DISCONTINUED | OUTPATIENT
Start: 2019-06-04 | End: 2019-06-04

## 2019-06-04 RX ORDER — LANOLIN ALCOHOL/MO/W.PET/CERES
1 CREAM (GRAM) TOPICAL
Qty: 0 | Refills: 0 | DISCHARGE
Start: 2019-06-04

## 2019-06-04 RX ADMIN — Medication 3 MILLILITER(S): at 13:19

## 2019-06-04 RX ADMIN — BUDESONIDE AND FORMOTEROL FUMARATE DIHYDRATE 2 PUFF(S): 160; 4.5 AEROSOL RESPIRATORY (INHALATION) at 08:22

## 2019-06-04 RX ADMIN — Medication 500 MICROGRAM(S): at 08:21

## 2019-06-04 RX ADMIN — Medication 4000 UNIT(S): at 12:14

## 2019-06-04 RX ADMIN — Medication 60 MILLIGRAM(S): at 06:18

## 2019-06-04 RX ADMIN — Medication 12.5 MILLIGRAM(S): at 06:18

## 2019-06-04 RX ADMIN — Medication 600 MILLIGRAM(S): at 06:18

## 2019-06-04 RX ADMIN — Medication 30 MILLIGRAM(S): at 06:15

## 2019-06-04 RX ADMIN — Medication 600 MILLIGRAM(S): at 16:59

## 2019-06-04 RX ADMIN — AZITHROMYCIN 500 MILLIGRAM(S): 500 TABLET, FILM COATED ORAL at 12:14

## 2019-06-04 NOTE — DISCHARGE NOTE NURSING/CASE MANAGEMENT/SOCIAL WORK - NSDCDPATPORTLINK_GEN_ALL_CORE
You can access the [facility], offered by [facility], by registering with the following website: [email or web address]

## 2019-06-04 NOTE — PROGRESS NOTE ADULT - SUBJECTIVE AND OBJECTIVE BOX
HOSPITALIST PROGRESS NOTE:  SUBJECTIVE:  PCP:  Chief Complaint: Patient is a 89y old  Female who presents with a chief complaint of acute copd (28 May 2019 14:45)      HPI:  89 y/o F PMHx of COPD, HTN, former smoker presents to ED BIBA from home worsening SOB with frequent coughing.  Patient states she was diagnosed with copd many years ago, but she has not had any flare up for many years and has been stable. She states she has a cough , non productive that will not linda.  Patient was hypoxic in ER mid 80s on arrival. She was given supplemntal neb, steroid, oxygen in ER which releived some of her symptoms. She denies any fevers, chills, n/v/d. (26 May 2019 15:05)    5/29: Improving; Less wheezing; No overnight events or complaints   5/30:  today patient is coughing, wheezing and short of breath more.     Allergies:  codeine (Nausea)  sulfa drugs (Unknown)    REVIEW OF SYSTEMS:  See HPI. All other review of systems is negative unless indicated above.     OBJECTIVE  Physical Exam:  Vital Signs Last 24 Hrs  T(C): 37.1 (30 May 2019 10:59), Max: 37.1 (29 May 2019 16:45)  T(F): 98.7 (30 May 2019 10:59), Max: 98.8 (30 May 2019 05:03)  HR: 102 (30 May 2019 14:04) (83 - 107)  BP: 138/62 (30 May 2019 10:59) (138/62 - 155/66)  BP(mean): --  RR: 18 (30 May 2019 10:59) (17 - 18)  SpO2: 97% (30 May 2019 14:04) (97% - 100%)      Constitutional: NAD, awake and alert, well-developed  Neurological: AAO x 3, no focal deficits  HEENT: PERRLA, EOMI, MMM  Neck: Soft and supple, No LAD, No JVD  Respiratory: Breath sounds are clear bilaterally, + wheezing No rales or rhonchi  Cardiovascular: S1 and S2, regular rate and rhythm; no Murmurs, gallops or rubs  Gastrointestinal: Bowel Sounds present, soft, nontender, nondistended, no guarding, no rebound tenderness  Back: No CVA tenderness   Extremities: No peripheral edema  Vascular: 2+ peripheral pulses  Musculoskeletal: 5/5 strength b/l upper and lower extremities  Skin: No rashes  Breast: Deferred  Rectal: Deferred    MEDICATIONS  (STANDING):  ALBUTerol/ipratropium for Nebulization 3 milliLiter(s) Nebulizer every 6 hours  buDESOnide   0.5 milliGRAM(s) Respule 0.5 milliGRAM(s) Inhalation two times a day  enoxaparin Injectable 30 milliGRAM(s) SubCutaneous every 24 hours  guaiFENesin    Syrup 200 milliGRAM(s) Oral every 6 hours  methylPREDNISolone sodium succinate Injectable 40 milliGRAM(s) IV Push two times a day    Blood Culture:   05-26 @ 11:16  Organism --  Gram Stain Blood -- Gram Stain --  Specimen Source .Blood Blood  Culture-Blood --    RADIOLOGY/EKG:    < from: Xray Chest 1 View- PORTABLE-Urgent (05.26.19 @ 13:07) >    Findings/  Impression: The heart is unremarkable. The lungs are clear. Bones are   unremarkable for age.      < end of copied text >    < from: CT Chest No Cont (05.26.19 @ 14:06) >    Impression: Moderate-severe centrilobular emphysematous changes. No   pneumonia.       < end of copied text >
CHIEF COMPLAINT/Diagnosis: acute exacerbation of copd    SUBJECTIVE: patient states she was feeling better  yesturday, but today feels like she is going backwards. still c/o phlegm that  she cant fully bring up. oxygen is good at rest, on my encounter at bedside patient is saturating 95 % on room air at rest with slightly elevated HR of 110    REVIEW OF SYSTEMS:    CONSTITUTIONAL: No weakness, fevers or chills  EYES/ENT: No visual changes;  No vertigo or throat pain   NECK: No pain or stiffness  RESPIRATORY: No cough, wheezing, hemoptysis; No shortness of breath  CARDIOVASCULAR: No chest pain or palpitations  GASTROINTESTINAL: No abdominal or epigastric pain. No nausea, vomiting, or hematemesis; No diarrhea or constipation. No melena or hematochezia.  GENITOURINARY: No dysuria, frequency or hematuria  NEUROLOGICAL: No numbness or weakness  SKIN: No itching, burning, rashes, or lesions   All other review of systems is negative unless indicated above    Vital Signs Last 24 Hrs  T(C): 36.8 (31 May 2019 11:17), Max: 36.9 (30 May 2019 16:26)  T(F): 98.2 (31 May 2019 11:17), Max: 98.5 (30 May 2019 16:26)  HR: 85 (31 May 2019 11:17) (60 - 112)  BP: 146/63 (31 May 2019 11:17) (139/68 - 151/59)  BP(mean): 85 (31 May 2019 05:03) (85 - 85)  RR: 18 (31 May 2019 11:17) (17 - 19)  SpO2: 91% (31 May 2019 11:17) (91% - 100%)    I&O's Summary      CAPILLARY BLOOD GLUCOSE          PHYSICAL EXAM:    Constitutional: NAD, awake and alert, well-developed  HEENT: PERR, EOMI, Normal Hearing, MMM  Neck: Soft and supple, No LAD, No JVD  Respiratory: Breath sounds are clear bilaterally, No wheezing, rales or rhonchi  Cardiovascular: S1 and S2, regular rate and rhythm, no Murmurs, gallops or rubs  Gastrointestinal: Bowel Sounds present, soft, nontender, nondistended, no guarding, no rebound  Extremities: No peripheral edema  Vascular: 2+ peripheral pulses  Neurological: A/O x 3, no focal deficits  Musculoskeletal: 5/5 strength b/l upper and lower extremities  Skin: No rashes    MEDICATIONS:  MEDICATIONS  (STANDING):  acetylcysteine 20%  Inhalation 4 milliLiter(s) Inhalation every 6 hours  ALBUTerol/ipratropium for Nebulization 3 milliLiter(s) Nebulizer every 6 hours  azithromycin   Tablet 500 milliGRAM(s) Oral daily  buDESOnide   0.5 milliGRAM(s) Respule 0.5 milliGRAM(s) Inhalation two times a day  enoxaparin Injectable 30 milliGRAM(s) SubCutaneous every 24 hours  methylPREDNISolone sodium succinate Injectable 30 milliGRAM(s) IV Push two times a day      LABS: All Labs Reviewed:            Assessment and Plan:       89 y/o F PMHx of COPD, HTN, former smoker presents to ED BIBA from home worsening SOB with frequent coughing.  Patient states she was diagnosed with copd many years ago, but she has not had any flare up for many years and has been stable. She states she has a cough , non productive that will not linda.  Patient was hypoxic in ER mid 80s on arrival. She was given supplemntal neb, steroid, oxygen in ER which releived some of her symptoms. She denies any fevers, chills, n/v/d.  Found to be in acute respiratory distress and acute hypoxic resp failure.     *Acute exacerbation of copd with acute hypoxic resp failure  -CT no PNA  -tapered solumederol to 30mg q12h; patient getting very anxoius from the steroids, HR is high even though she is not hypoxic and hands are shaky  -change Duonebs to Ipratropium nebulizer secondary to elevated Heart rate 110-115  -pulmicort  -c/w chest PT  -c/w mucinex  -Pulmonary consult appreciated  -add Zithromax  -Start Mucomyst x 3 doses. w/ albuterol x 3 doses with mucomyst.  -patient thus far qualifies for home o2, but some concerns regarding ambulation with o2 at home, fall risk in [place]. Re-eval for o2 after the weekend.   -encourage ambulation to promote expelling of phlegm  -oxygen is good at rest, on my encounter at bedside patient is saturating 95 % on room air at rest with slightly elevated HR of 110    DVT prophy- sc heparin
CHIEF COMPLAINT/Diagnosis: acute respiratory failure/ acute copd exacerbation    SUBJECTIVE: no complaints    REVIEW OF SYSTEMS:    CONSTITUTIONAL: No weakness, fevers or chills  EYES/ENT: No visual changes;  No vertigo or throat pain   NECK: No pain or stiffness  RESPIRATORY: No cough, wheezing, hemoptysis; No shortness of breath  CARDIOVASCULAR: No chest pain or palpitations  GASTROINTESTINAL: No abdominal or epigastric pain. No nausea, vomiting, or hematemesis; No diarrhea or constipation. No melena or hematochezia.  GENITOURINARY: No dysuria, frequency or hematuria  NEUROLOGICAL: No numbness or weakness  SKIN: No itching, burning, rashes, or lesions   All other review of systems is negative unless indicated above    Vital Signs Last 24 Hrs  T(C): 36.7 (28 May 2019 10:47), Max: 36.7 (28 May 2019 10:47)  T(F): 98.1 (28 May 2019 10:47), Max: 98.1 (28 May 2019 10:47)  HR: 112 (28 May 2019 10:47) (84 - 112)  BP: 106/85 (28 May 2019 10:47) (106/85 - 147/73)  BP(mean): --  RR: 16 (28 May 2019 10:47) (16 - 18)  SpO2: 100% (28 May 2019 10:47) (99% - 100%)    I&O's Summary      CAPILLARY BLOOD GLUCOSE          PHYSICAL EXAM:    Constitutional: NAD, awake and alert, well-developed  HEENT: PERR, EOMI, Normal Hearing, MMM  Neck: Soft and supple, No LAD, No JVD  Respiratory: Breath sounds are clear bilaterally, No wheezing, rales or rhonchi  Cardiovascular: S1 and S2, regular rate and rhythm, no Murmurs, gallops or rubs  Gastrointestinal: Bowel Sounds present, soft, nontender, nondistended, no guarding, no rebound  Extremities: No peripheral edema  Vascular: 2+ peripheral pulses  Neurological: A/O x 3, no focal deficits  Musculoskeletal: 5/5 strength b/l upper and lower extremities  Skin: No rashes    MEDICATIONS:  MEDICATIONS  (STANDING):  ALBUTerol/ipratropium for Nebulization 3 milliLiter(s) Nebulizer every 6 hours  buDESOnide   0.5 milliGRAM(s) Respule 0.5 milliGRAM(s) Inhalation two times a day  enoxaparin Injectable 30 milliGRAM(s) SubCutaneous every 24 hours  methylPREDNISolone sodium succinate Injectable 40 milliGRAM(s) IV Push every 8 hours      LABS: All Labs Reviewed:                        12.4   5.25  )-----------( 201      ( 27 May 2019 07:04 )             37.0     05-27    138  |  103  |  27<H>  ----------------------------<  183<H>  3.6   |  23  |  1.12    Ca    9.1      27 May 2019 07:04            Blood Culture: 05-26 @ 11:16  Organism --  Gram Stain Blood -- Gram Stain --  Specimen Source .Blood Blood  Culture-Blood --          Assessment and Plan:   	  89 y/o F PMHx of COPD, HTN, former smoker presents to ED BIBA from home worsening SOB with frequent coughing.  Patient states she was diagnosed with copd many years ago, but she has not had any flare up for many years and has been stable. She states she has a cough , non productive that will not linda.  Patient was hypoxic in ER mid 80s on arrival. She was given supplemntal neb, steroid, oxygen in ER which releived some of her symptoms. She denies any fevers, chills, n/v/d.    Found to be in acute respiratory distress and acute hypoxic resp failure.     Admit for acute exacerbation of copd with acute hypoxic resp failure  -tapered solumederol today as her wheezing as abated. >> now on solumederol 40 q8h.   -taper as apppropriate.   -c/w duonebs every 6  -pulmicort  -c/w chest PT  -c/w mucinex    DVT prophy- sc heparin
CHIEF COMPLAINT/diagnosis: acute resp failure/ acute copd exacerbation    SUBJECTIVE: does not feel any better, feels very sob on ambulation particularly    REVIEW OF SYSTEMS:    CONSTITUTIONAL: No weakness, fevers or chills  EYES/ENT: No visual changes;  No vertigo or throat pain   NECK: No pain or stiffness  RESPIRATORY: No cough, wheezing, hemoptysis; No shortness of breath  CARDIOVASCULAR: No chest pain or palpitations  GASTROINTESTINAL: No abdominal or epigastric pain. No nausea, vomiting, or hematemesis; No diarrhea or constipation. No melena or hematochezia.  GENITOURINARY: No dysuria, frequency or hematuria  NEUROLOGICAL: No numbness or weakness  SKIN: No itching, burning, rashes, or lesions   All other review of systems is negative unless indicated above    Vital Signs Last 24 Hrs  T(C): 36.4 (27 May 2019 05:04), Max: 36.9 (26 May 2019 14:15)  T(F): 97.5 (27 May 2019 05:04), Max: 98.5 (26 May 2019 14:32)  HR: 86 (27 May 2019 08:04) (76 - 119)  BP: 142/62 (27 May 2019 05:04) (117/48 - 186/72)  BP(mean): --  RR: 16 (27 May 2019 05:04) (16 - 21)  SpO2: 95% (27 May 2019 05:04) (92% - 100%)    I&O's Summary    26 May 2019 07:01  -  27 May 2019 07:00  --------------------------------------------------------  IN: 300 mL / OUT: 0 mL / NET: 300 mL        CAPILLARY BLOOD GLUCOSE          PHYSICAL EXAM:    Constitutional: NAD, awake and alert, well-developed  HEENT: PERR, EOMI, Normal Hearing, MMM  Neck: Soft and supple, No LAD, No JVD  Respiratory: wheezing and rhonchi b/l   Cardiovascular: S1 and S2, regular rate and rhythm, no Murmurs, gallops or rubs  Gastrointestinal: Bowel Sounds present, soft, nontender, nondistended, no guarding, no rebound  Extremities: No peripheral edema  Vascular: 2+ peripheral pulses  Neurological: A/O x 3, no focal deficits  Musculoskeletal: 5/5 strength b/l upper and lower extremities  Skin: No rashes    MEDICATIONS:  MEDICATIONS  (STANDING):  ALBUTerol/ipratropium for Nebulization 3 milliLiter(s) Nebulizer every 6 hours  buDESOnide   0.5 milliGRAM(s) Respule 0.5 milliGRAM(s) Inhalation two times a day  docusate sodium 100 milliGRAM(s) Oral three times a day  enoxaparin Injectable 30 milliGRAM(s) SubCutaneous every 24 hours  guaiFENesin  milliGRAM(s) Oral every 12 hours  methylPREDNISolone sodium succinate Injectable 60 milliGRAM(s) IV Push every 6 hours      LABS: All Labs Reviewed:                        12.4   5.25  )-----------( 201      ( 27 May 2019 07:04 )             37.0     05-27    138  |  103  |  27<H>  ----------------------------<  183<H>  3.6   |  23  |  1.12    Ca    9.1      27 May 2019 07:04    TPro  7.9  /  Alb  3.8  /  TBili  0.6  /  DBili  x   /  AST  22  /  ALT  13  /  AlkPhos  69  05-26    PT/INR - ( 26 May 2019 11:16 )   PT: 11.7 sec;   INR: 1.05 ratio         PTT - ( 26 May 2019 11:16 )  PTT:30.0 sec        Assessment and Plan:   	  89 y/o F PMHx of COPD, HTN, former smoker presents to ED BIBA from home worsening SOB with frequent coughing.  Patient states she was diagnosed with copd many years ago, but she has not had any flare up for many years and has been stable. She states she has a cough , non productive that will not linda.  Patient was hypoxic in ER mid 80s on arrival. She was given supplemntal neb, steroid, oxygen in ER which releived some of her symptoms. She denies any fevers, chills, n/v/d.    Found to be in acute respiratory distress and acute hypoxic resp failure.     Admit for acute exacerbation of copd with acute hypoxic resp failure  -increase solumederol to 60 q6h  -c/w duonebs every 6  -pulmicort  -start chest PT  -start mucinex q12h    DVT prophy- sc heparin
HOSPITALIST PROGRESS NOTE:  SUBJECTIVE:  PCP:  Chief Complaint: Patient is a 89y old  Female who presents with a chief complaint of acute copd (28 May 2019 14:45)      HPI:  89 y/o F PMHx of COPD, HTN, former smoker presents to ED BIBA from home worsening SOB with frequent coughing.  Patient states she was diagnosed with copd many years ago, but she has not had any flare up for many years and has been stable. She states she has a cough , non productive that will not linda.  Patient was hypoxic in ER mid 80s on arrival. She was given supplemntal neb, steroid, oxygen in ER which releived some of her symptoms. She denies any fevers, chills, n/v/d. (26 May 2019 15:05)    5/29: Improving; Less wheezing; No overnight events or complaints     Allergies:  codeine (Nausea)  sulfa drugs (Unknown)    REVIEW OF SYSTEMS:  See HPI. All other review of systems is negative unless indicated above.     OBJECTIVE  Physical Exam:  Vital Signs:    Vital Signs Last 24 Hrs  T(C): 36.8 (29 May 2019 10:49), Max: 37 (28 May 2019 17:53)  T(F): 98.2 (29 May 2019 10:49), Max: 98.6 (28 May 2019 17:53)  HR: 94 (29 May 2019 10:49) (94 - 109)  BP: 143/58 (29 May 2019 10:49) (140/50 - 144/63)  BP(mean): --  RR: 18 (29 May 2019 10:49) (17 - 18)  SpO2: 100% (29 May 2019 10:49) (98% - 100%)  I&O's Summary      Constitutional: NAD, awake and alert, well-developed  Neurological: AAO x 3, no focal deficits  HEENT: PERRLA, EOMI, MMM  Neck: Soft and supple, No LAD, No JVD  Respiratory: Breath sounds are clear bilaterally, No wheezing, rales or rhonchi  Cardiovascular: S1 and S2, regular rate and rhythm; no Murmurs, gallops or rubs  Gastrointestinal: Bowel Sounds present, soft, nontender, nondistended, no guarding, no rebound tenderness  Back: No CVA tenderness   Extremities: No peripheral edema  Vascular: 2+ peripheral pulses  Musculoskeletal: 5/5 strength b/l upper and lower extremities  Skin: No rashes  Breast: Deferred  Rectal: Deferred    MEDICATIONS  (STANDING):  ALBUTerol/ipratropium for Nebulization 3 milliLiter(s) Nebulizer every 6 hours  buDESOnide   0.5 milliGRAM(s) Respule 0.5 milliGRAM(s) Inhalation two times a day  enoxaparin Injectable 30 milliGRAM(s) SubCutaneous every 24 hours  guaiFENesin    Syrup 200 milliGRAM(s) Oral every 6 hours  methylPREDNISolone sodium succinate Injectable 40 milliGRAM(s) IV Push two times a day    Blood Culture:   05-26 @ 11:16  Organism --  Gram Stain Blood -- Gram Stain --  Specimen Source .Blood Blood  Culture-Blood --    RADIOLOGY/EKG:    < from: Xray Chest 1 View- PORTABLE-Urgent (05.26.19 @ 13:07) >    Findings/  Impression: The heart is unremarkable. The lungs are clear. Bones are   unremarkable for age.      < end of copied text >    < from: CT Chest No Cont (05.26.19 @ 14:06) >    Impression: Moderate-severe centrilobular emphysematous changes. No   pneumonia.       < end of copied text >
HOSPITALIST PROGRESS NOTE:  SUBJECTIVE:  PCP:  Chief Complaint: Patient is a 89y old  Female who presents with a chief complaint of acute copd (28 May 2019 14:45)      HPI:  89 y/o F PMHx of COPD, HTN, former smoker presents to ED BIBA from home worsening SOB with frequent coughing.  Patient states she was diagnosed with copd many years ago, but she has not had any flare up for many years and has been stable. She states she has a cough , non productive that will not linda.  Patient was hypoxic in ER mid 80s on arrival. She was given supplemntal neb, steroid, oxygen in ER which releived some of her symptoms. She denies any fevers, chills, n/v/d. (26 May 2019 15:05)    5/29: Improving; Less wheezing; No overnight events or complaints   5/30:  today patient is coughing, wheezing and short of breath more.   6/1: Yesterday patient was tachycardic and Albuterol was D/C; Patient was not getting her Propranolol since she has been here; + cough dyspnea improving; patient anxious about going home and paying for her Bills    Allergies:  codeine (Nausea)  sulfa drugs (Unknown)    REVIEW OF SYSTEMS:  See HPI. All other review of systems is negative unless indicated above.     OBJECTIVE  Physical Exam:  Vital Signs Last 24 Hrs  T(C): 36.4 (01 Jun 2019 11:00), Max: 36.9 (31 May 2019 17:30)  T(F): 97.6 (01 Jun 2019 11:00), Max: 98.4 (31 May 2019 17:30)  HR: 97 (01 Jun 2019 11:00) (76 - 121)  BP: 159/68 (01 Jun 2019 11:00) (140/58 - 168/82)  BP(mean): --  RR: 18 (01 Jun 2019 11:00) (16 - 18)  SpO2: 97% (01 Jun 2019 11:00) (91% - 97%)    Constitutional: NAD, awake and alert, well-developed  Neurological: AAO x 3, no focal deficits  HEENT: PERRLA, EOMI, MMM  Neck: Soft and supple, No LAD, No JVD  Respiratory: Breath sounds are clear bilaterally, + wheezing No rales or rhonchi  Cardiovascular: S1 and S2, regular rate and rhythm; no Murmurs, gallops or rubs  Gastrointestinal: Bowel Sounds present, soft, nontender, nondistended, no guarding, no rebound tenderness  Back: No CVA tenderness   Extremities: No peripheral edema  Vascular: 2+ peripheral pulses  Musculoskeletal: 5/5 strength b/l upper and lower extremities  Skin: No rashes  Breast: Deferred  Rectal: Deferred    MEDICATIONS  (STANDING):  ALBUTerol/ipratropium for Nebulization 3 milliLiter(s) Nebulizer every 6 hours  buDESOnide   0.5 milliGRAM(s) Respule 0.5 milliGRAM(s) Inhalation two times a day  enoxaparin Injectable 30 milliGRAM(s) SubCutaneous every 24 hours  guaiFENesin    Syrup 200 milliGRAM(s) Oral every 6 hours  methylPREDNISolone sodium succinate Injectable 40 milliGRAM(s) IV Push two times a day    Blood Culture:   05-26 @ 11:16  Organism --  Gram Stain Blood -- Gram Stain --  Specimen Source .Blood Blood  Culture-Blood --    RADIOLOGY/EKG:    < from: Xray Chest 1 View- PORTABLE-Urgent (05.26.19 @ 13:07) >    Findings/  Impression: The heart is unremarkable. The lungs are clear. Bones are   unremarkable for age.      < end of copied text >    < from: CT Chest No Cont (05.26.19 @ 14:06) >    Impression: Moderate-severe centrilobular emphysematous changes. No   pneumonia.       < end of copied text >
HOSPITALIST PROGRESS NOTE:  SUBJECTIVE:  PCP:  Chief Complaint: Patient is a 89y old  Female who presents with a chief complaint of acute copd (28 May 2019 14:45)      HPI:  89 y/o F PMHx of COPD, HTN, former smoker presents to ED BIBA from home worsening SOB with frequent coughing.  Patient states she was diagnosed with copd many years ago, but she has not had any flare up for many years and has been stable. She states she has a cough , non productive that will not linda.  Patient was hypoxic in ER mid 80s on arrival. She was given supplemntal neb, steroid, oxygen in ER which releived some of her symptoms. She denies any fevers, chills, n/v/d. (26 May 2019 15:05)    5/29: Improving; Less wheezing; No overnight events or complaints   5/30:  today patient is coughing, wheezing and short of breath more.   6/1: Yesterday patient was tachycardic and Albuterol was D/C; Patient was not getting her Propranolol since she has been here; + cough dyspnea improving; patient anxious about going home and paying for her Bills  6/2:  Patient has no complaints; Pulse ox on RA was 80%; Patient understands she cat need Home O2 No other events     Allergies:  codeine (Nausea)  sulfa drugs (Unknown)    REVIEW OF SYSTEMS:  See HPI. All other review of systems is negative unless indicated above.     OBJECTIVE  Physical Exam:  Vital Signs Last 24 Hrs  T(C): 36.4 (02 Jun 2019 11:12), Max: 36.9 (01 Jun 2019 17:29)  T(F): 97.6 (02 Jun 2019 11:12), Max: 98.5 (02 Jun 2019 04:38)  HR: 66 (02 Jun 2019 11:12) (66 - 86)  BP: 122/84 (02 Jun 2019 11:12) (122/84 - 163/59)  BP(mean): --  RR: 18 (02 Jun 2019 11:12) (18 - 96)  SpO2: 96% (02 Jun 2019 11:12) (89% - 97%)    Constitutional: NAD, awake and alert, well-developed  Neurological: AAO x 3, no focal deficits  HEENT: PERRLA, EOMI, MMM  Neck: Soft and supple, No LAD, No JVD  Respiratory: Breath sounds are clear bilaterally, + wheezing No rales or rhonchi  Cardiovascular: S1 and S2, regular rate and rhythm; no Murmurs, gallops or rubs  Gastrointestinal: Bowel Sounds present, soft, nontender, nondistended, no guarding, no rebound tenderness  Back: No CVA tenderness   Extremities: No peripheral edema  Vascular: 2+ peripheral pulses  Musculoskeletal: 5/5 strength b/l upper and lower extremities  Skin: No rashes  Breast: Deferred  Rectal: Deferred    MEDICATIONS  (STANDING):  ALBUTerol/ipratropium for Nebulization 3 milliLiter(s) Nebulizer every 6 hours  buDESOnide   0.5 milliGRAM(s) Respule 0.5 milliGRAM(s) Inhalation two times a day  enoxaparin Injectable 30 milliGRAM(s) SubCutaneous every 24 hours  guaiFENesin    Syrup 200 milliGRAM(s) Oral every 6 hours  methylPREDNISolone sodium succinate Injectable 40 milliGRAM(s) IV Push two times a day    Lab Results:  CBC    .		Differential:	[] Automated		[] Manual  Chemistry    06-01    141  |  105  |  37<H>  ----------------------------<  170<H>  4.4   |  27  |  1.07    Ca    9.1      01 Jun 2019 07:23        RADIOLOGY/EKG:    < from: Xray Chest 1 View- PORTABLE-Urgent (05.26.19 @ 13:07) >    Findings/  Impression: The heart is unremarkable. The lungs are clear. Bones are   unremarkable for age.      < end of copied text >    < from: CT Chest No Cont (05.26.19 @ 14:06) >    Impression: Moderate-severe centrilobular emphysematous changes. No   pneumonia.       < end of copied text >
HOSPITALIST PROGRESS NOTE:  SUBJECTIVE:  PCP:  Chief Complaint: Patient is a 89y old  Female who presents with a chief complaint of acute copd (28 May 2019 14:45)      HPI:  89 y/o F PMHx of COPD, HTN, former smoker presents to ED BIBA from home worsening SOB with frequent coughing.  Patient states she was diagnosed with copd many years ago, but she has not had any flare up for many years and has been stable. She states she has a cough , non productive that will not linda.  Patient was hypoxic in ER mid 80s on arrival. She was given supplemntal neb, steroid, oxygen in ER which releived some of her symptoms. She denies any fevers, chills, n/v/d. (26 May 2019 15:05)    5/29: Improving; Less wheezing; No overnight events or complaints   5/30:  today patient is coughing, wheezing and short of breath more.   6/1: Yesterday patient was tachycardic and Albuterol was D/C; Patient was not getting her Propranolol since she has been here; + cough dyspnea improving; patient anxious about going home and paying for her Bills  6/2:  Patient has no complaints; Pulse ox on RA was 80%; Patient understands she cat need Home O2 No other events   6/3:  Patient has no complaints. Not improving much. seen by pulm and venous doppler ordered     Allergies:  codeine (Nausea)  sulfa drugs (Unknown)    REVIEW OF SYSTEMS:  See HPI. All other review of systems is negative unless indicated above.     OBJECTIVE  Physical Exam:  Vital Signs Last 24 Hrs  T(C): 36.8 (03 Jun 2019 11:27), Max: 37.1 (02 Jun 2019 22:32)  T(F): 98.3 (03 Jun 2019 11:27), Max: 98.7 (02 Jun 2019 22:32)  HR: 64 (03 Jun 2019 11:27) (64 - 89)  BP: 154/50 (03 Jun 2019 11:27) (140/62 - 154/50)  BP(mean): --  RR: 18 (03 Jun 2019 11:27) (18 - 18)  SpO2: 99% (03 Jun 2019 11:27) (83% - 99%)    Constitutional: NAD, awake and alert, well-developed  Neurological: AAO x 3, no focal deficits  HEENT: PERRLA, EOMI, MMM  Neck: Soft and supple, No LAD, No JVD  Respiratory: Breath sounds are clear bilaterally, + wheezing No rales or rhonchi  Cardiovascular: S1 and S2, regular rate and rhythm; no Murmurs, gallops or rubs  Gastrointestinal: Bowel Sounds present, soft, nontender, nondistended, no guarding, no rebound tenderness  Back: No CVA tenderness   Extremities: No peripheral edema  Vascular: 2+ peripheral pulses  Musculoskeletal: 5/5 strength b/l upper and lower extremities  Skin: No rashes  Breast: Deferred  Rectal: Deferred    MEDICATIONS  (STANDING):  ALBUTerol/ipratropium for Nebulization 3 milliLiter(s) Nebulizer every 6 hours  buDESOnide   0.5 milliGRAM(s) Respule 0.5 milliGRAM(s) Inhalation two times a day  enoxaparin Injectable 30 milliGRAM(s) SubCutaneous every 24 hours  guaiFENesin    Syrup 200 milliGRAM(s) Oral every 6 hours  methylPREDNISolone sodium succinate Injectable 40 milliGRAM(s) IV Push two times a day    Lab Results:  CBC    .		Differential:	[] Automated		[] Manual  Chemistry    06-01    141  |  105  |  37<H>  ----------------------------<  170<H>  4.4   |  27  |  1.07    Ca    9.1      01 Jun 2019 07:23        RADIOLOGY/EKG:    < from: Xray Chest 1 View- PORTABLE-Urgent (05.26.19 @ 13:07) >    Findings/  Impression: The heart is unremarkable. The lungs are clear. Bones are   unremarkable for age.      < end of copied text >    < from: CT Chest No Cont (05.26.19 @ 14:06) >    Impression: Moderate-severe centrilobular emphysematous changes. No   pneumonia.       < end of copied text >
HOSPITALIST PROGRESS NOTE:  SUBJECTIVE:  PCP:  Chief Complaint: Patient is a 89y old  Female who presents with a chief complaint of acute copd (28 May 2019 14:45)      HPI:  89 y/o F PMHx of COPD, HTN, former smoker presents to ED BIBA from home worsening SOB with frequent coughing.  Patient states she was diagnosed with copd many years ago, but she has not had any flare up for many years and has been stable. She states she has a cough , non productive that will not linda.  Patient was hypoxic in ER mid 80s on arrival. She was given supplemntal neb, steroid, oxygen in ER which releived some of her symptoms. She denies any fevers, chills, n/v/d. (26 May 2019 15:05)    5/29: Improving; Less wheezing; No overnight events or complaints   5/30:  today patient is coughing, wheezing and short of breath more.   6/1: Yesterday patient was tachycardic and Albuterol was D/C; Patient was not getting her Propranolol since she has been here; + cough dyspnea improving; patient anxious about going home and paying for her Bills  6/2:  Patient has no complaints; Pulse ox on RA was 80%; Patient understands she cat need Home O2 No other events   6/3:  Patient has no complaints. Not improving much. seen by pulm and venous doppler ordered   6/4:  dimer and doppler neg;  Patient prefers to go home rather than going to rehab    Allergies:  codeine (Nausea)  sulfa drugs (Unknown)    REVIEW OF SYSTEMS:  See HPI. All other review of systems is negative unless indicated above.     OBJECTIVE  Physical Exam:  Vital Signs Last 24 Hrs  T(C): 36.5 (04 Jun 2019 05:02), Max: 36.8 (03 Jun 2019 11:27)  T(F): 97.7 (04 Jun 2019 05:02), Max: 98.3 (03 Jun 2019 11:27)  HR: 63 (04 Jun 2019 08:22) (61 - 89)  BP: 148/57 (04 Jun 2019 05:02) (148/57 - 154/50)  BP(mean): --  RR: 18 (04 Jun 2019 05:02) (17 - 18)  SpO2: 97% (04 Jun 2019 08:22) (94% - 99%)    Constitutional: NAD, awake and alert, well-developed  Neurological: AAO x 3, no focal deficits  HEENT: PERRLA, EOMI, MMM  Neck: Soft and supple, No LAD, No JVD  Respiratory: Breath sounds are clear bilaterally, + wheezing No rales or rhonchi  Cardiovascular: S1 and S2, regular rate and rhythm; no Murmurs, gallops or rubs  Gastrointestinal: Bowel Sounds present, soft, nontender, nondistended, no guarding, no rebound tenderness  Back: No CVA tenderness   Extremities: No peripheral edema  Vascular: 2+ peripheral pulses  Musculoskeletal: 5/5 strength b/l upper and lower extremities  Skin: No rashes  Breast: Deferred  Rectal: Deferred    MEDICATIONS  (STANDING):  ALBUTerol/ipratropium for Nebulization 3 milliLiter(s) Nebulizer every 6 hours  buDESOnide   0.5 milliGRAM(s) Respule 0.5 milliGRAM(s) Inhalation two times a day  enoxaparin Injectable 30 milliGRAM(s) SubCutaneous every 24 hours  guaiFENesin    Syrup 200 milliGRAM(s) Oral every 6 hours  methylPREDNISolone sodium succinate Injectable 40 milliGRAM(s) IV Push two times a day    Lab Results:  CBC  CBC Full  -  ( 04 Jun 2019 08:00 )  WBC Count : 11.42 K/uL  RBC Count : 4.24 M/uL  Hemoglobin : 13.2 g/dL  Hematocrit : 40.0 %  Platelet Count - Automated : 212 K/uL  Mean Cell Volume : 94.3 fl  Mean Cell Hemoglobin : 31.1 pg  Mean Cell Hemoglobin Concentration : 33.0 gm/dL  Auto Neutrophil # : x  Auto Lymphocyte # : x  Auto Monocyte # : x  Auto Eosinophil # : x  Auto Basophil # : x  Auto Neutrophil % : x  Auto Lymphocyte % : x  Auto Monocyte % : x  Auto Eosinophil % : x  Auto Basophil % : x    .		Differential:	[] Automated		[] Manual  Chemistry                        13.2   11.42 )-----------( 212      ( 04 Jun 2019 08:00 )             40.0     06-04    139  |  99  |  44<H>  ----------------------------<  118<H>  4.2   |  34<H>  |  1.14    Ca    8.9      04 Jun 2019 08:00        RADIOLOGY/EKG:    < from: Xray Chest 1 View- PORTABLE-Urgent (05.26.19 @ 13:07) >    Findings/  Impression: The heart is unremarkable. The lungs are clear. Bones are   unremarkable for age.      < end of copied text >    < from: CT Chest No Cont (05.26.19 @ 14:06) >    Impression: Moderate-severe centrilobular emphysematous changes. No   pneumonia.       < end of copied text >
SUBJECTIVE     Patient still wheezing and feels difficulty in expectoration of mucus   with the tachycardia albuterol was discontinued and currently only on atrovent and pulmicort nebs   no fever and follow up cxr noted no gross chf or pneumonia  with the episodes of desaturation   complaining of some leg discomfort .  not in distress on the 02 by the nasal canula with the sat of 95   today day 4 of azithromycin     PAST MEDICAL & SURGICAL HISTORY:  Glaucoma  Osteoarthritis  Benign essential tremor  COPD (chronic obstructive pulmonary disease)  Hypertension  Ankle injury  Dislocated intraocular lens: right eye  Glaucoma filtering bleb of both eyes    OBJECTIVE   Vital Signs Last 24 Hrs  T(C): 36.8 (03 Jun 2019 04:46), Max: 37.1 (02 Jun 2019 22:32)  T(F): 98.2 (03 Jun 2019 04:46), Max: 98.7 (02 Jun 2019 22:32)  HR: 70 (03 Jun 2019 04:46) (66 - 85)  BP: 140/62 (03 Jun 2019 04:46) (122/84 - 141/69)  BP(mean): --  RR: 18 (03 Jun 2019 04:46) (18 - 18)  SpO2: 97% (03 Jun 2019 04:46) (83% - 97%)    Review of systems   as dictated in the history of present illness with the review of other systems non contributory     PHYSICAL EXAM:  Constitutional: , awake and alert, not in distress seen sitting in chair on the nasal canula   HEENT: Normo cephalic atraumatic  Neck: Soft and supple, No J.V.D   Respiratory: vesicular breathing distant breath sounds with the wheeze and rhonchi   Cardiovascular: S1 and S2, regular rate .   Gastrointestinal:  soft, nontender,   Extremities: statsis dermatitis with the ankle edema of the right leg  Neurological: No new  focal deficits.    MEDICATIONS  (STANDING):  azithromycin   Tablet 500 milliGRAM(s) Oral daily  buDESOnide   0.5 milliGRAM(s) Respule 0.5 milliGRAM(s) Inhalation two times a day  cholecalciferol 4000 Unit(s) Oral daily  enoxaparin Injectable 30 milliGRAM(s) SubCutaneous every 24 hours  hydrochlorothiazide 12.5 milliGRAM(s) Oral daily  ipratropium    for Nebulization 500 MICROGram(s) Nebulizer every 6 hours  methylPREDNISolone sodium succinate Injectable 30 milliGRAM(s) IV Push two times a day  propranolol LA 60 milliGRAM(s) Oral daily        < from: Xray Chest 1 View- PORTABLE-Urgent (06.02.19 @ 13:10) >  XAM:  XR CHEST PORTABLE URGENT 1V                            PROCEDURE DATE:  06/02/2019          INTERPRETATION:  AP erect chest on June 2, 2019 at 1:03 PM. Patient is   short of breath.    Heart size is within normal limits. Prominent fat density at right   diaphragmatic base is similar to CAT scan of May 26.    There are few scattered small calcified granulomas in the lungs left   greater than right.    On x-ray there is no sense of active lung or pleural finding.    Mild right curve at thoracolumbar junction is again seen.    On chest film May 26 there are a few vaguely defined densities seen   suggesting slight infiltrates. These are no longer evident.    IMPRESSION: Small calcified granulomas and prominent fat pad at right   base again noted.      < end of copied text >
SUBJECTIVE     patient still has cough and wheezing   comfortable breathing on the nasal canula   Her Di dimer assay is normal with the low clinical suspicion makes PE unlikely   venous duplex is negative for the dvt   refusing the mucomyst     PAST MEDICAL & SURGICAL HISTORY:  Glaucoma  Osteoarthritis  Benign essential tremor  COPD (chronic obstructive pulmonary disease)  Hypertension  Ankle injury  Dislocated intraocular lens: right eye  Glaucoma filtering bleb of both eyes    OBJECTIVE   Vital Signs Last 24 Hrs  T(C): 36.5 (04 Jun 2019 05:02), Max: 36.8 (03 Jun 2019 11:27)  T(F): 97.7 (04 Jun 2019 05:02), Max: 98.3 (03 Jun 2019 11:27)  HR: 61 (04 Jun 2019 05:02) (61 - 89)  BP: 148/57 (04 Jun 2019 05:02) (148/57 - 154/50)  BP(mean): --  RR: 18 (04 Jun 2019 05:02) (17 - 18)  SpO2: 99% (04 Jun 2019 05:02) (94% - 99%)    Review of systems   as dictated in the history of present illness with the review of other systems non contributory     PHYSICAL EXAM:  Constitutional: , awake and alert, not in distress.  HEENT: Normo cephalic atraumatic  Neck: Soft and supple, No J.V.D   Respiratory: vesicular breathing has distant breath sounds with the occasional rhonchi   Cardiovascular: S1 and S2, regular rate .   Gastrointestinal:  soft, nontender,   Extremities: No  edema or calf tenderness .  Neurological: No new  focal deficits.    MEDICATIONS  (STANDING):  acetylcysteine 10%  Inhalation 3 milliLiter(s) Inhalation three times a day  azithromycin   Tablet 500 milliGRAM(s) Oral daily  buDESOnide 160 MICROgram(s)/formoterol 4.5 MICROgram(s) Inhaler 2 Puff(s) Inhalation two times a day  cholecalciferol 4000 Unit(s) Oral daily  enoxaparin Injectable 30 milliGRAM(s) SubCutaneous every 24 hours  guaiFENesin  milliGRAM(s) Oral every 12 hours  hydrochlorothiazide 12.5 milliGRAM(s) Oral daily  ipratropium    for Nebulization 500 MICROGram(s) Nebulizer every 6 hours  methylPREDNISolone sodium succinate Injectable 30 milliGRAM(s) IV Push two times a day  propranolol LA 60 milliGRAM(s) Oral daily                            13.2   11.42 )-----------( 212      ( 04 Jun 2019 08:00 )             40.0           < from: US Duplex Venous Lower Ext Complete, Bilateral (06.03.19 @ 15:45) >  EXAM:  US DPLX LWR EXT VEINS COMPL BI                            PROCEDURE DATE:  06/03/2019          INTERPRETATION:  CLINICAL INFORMATION: Pain in the lower extremities,   greater on the right    COMPARISON: None available.    TECHNIQUE: Duplex sonography of the BILATERAL LOWER extremities with   color and spectral Doppler, with and without compression.      FINDINGS:    There is normal compressibility of the bilateral common femoral, femoral   and popliteal veins.     Doppler examination shows normal spontaneous and phasic flow.    No calf vein thrombosis is detected.     In the right popliteal fossa is a 1 cm popliteal cyst    IMPRESSION:     No evidence of bilateral lower extremity deep venous thrombosis.    A 1 cm right popliteal cyst.                XENIA CHAIDEZ M.D., ATTENDING RADIOLOGIST    < end of copied text >
SUBJECTIVE     patient still sob and noted to have wheezing   feels comfortable with the 02     PAST MEDICAL & SURGICAL HISTORY:  Glaucoma  Osteoarthritis  Benign essential tremor  COPD (chronic obstructive pulmonary disease)  Hypertension  Ankle injury  Dislocated intraocular lens: right eye  Glaucoma filtering bleb of both eyes    OBJECTIVE   Vital Signs Last 24 Hrs  T(C): 36.9 (31 May 2019 05:03), Max: 37.1 (30 May 2019 10:59)  T(F): 98.5 (31 May 2019 05:03), Max: 98.7 (30 May 2019 10:59)  HR: 97 (31 May 2019 05:03) (60 - 112)  BP: 139/68 (31 May 2019 05:03) (138/62 - 151/59)  BP(mean): 85 (31 May 2019 05:03) (85 - 85)  RR: 19 (31 May 2019 05:03) (17 - 19)  SpO2: 95% (31 May 2019 05:03) (95% - 100%)    Review of systems   as dictated in the history of present illness with the review of other systems non contributory     PHYSICAL EXAM:  Constitutional: , awake and alert, not in distress and mildly anxious able to complete sentences   HEENT: Normo cephalic atraumatic  Neck: Soft and supple, No J.V.D   Respiratory: vesicular breathing has mild bilateral wheeze today   Cardiovascular: S1 and S2, regular rate .   Gastrointestinal:  soft, nontender,   Extremities: No  edema or calf tenderness .  Neurological: No new  focal deficits.    MEDICATIONS  (STANDING):  ALBUTerol/ipratropium for Nebulization 3 milliLiter(s) Nebulizer every 6 hours  azithromycin   Tablet 500 milliGRAM(s) Oral daily  buDESOnide   0.5 milliGRAM(s) Respule 0.5 milliGRAM(s) Inhalation two times a day  enoxaparin Injectable 30 milliGRAM(s) SubCutaneous every 24 hours  methylPREDNISolone sodium succinate Injectable 40 milliGRAM(s) IV Push two times a day      < from: CT Chest No Cont (05.26.19 @ 14:06) >  NTERPRETATION:  Clinical Information: Cough and dyspnea.    Comparison: 06/17/2016    Procedure: Noncontrast CT of the chest with axial, sagittal, coronal, and   axialMIP reconstructions.    Findings:     Airways, pleura, lungs: Central airways patent. Are unremarkable. There   is moderate-severe centrilobular emphysematous disease. No pneumonia.   Scattered nodules measuring up to 4 mm.    Vasculature: Atherosclerosis including the aorta and coronary arteries.  Mediastinum and katya: Aortic valve calcification. Normal size heart.   Pericardium and esophagus unremarkable.  Chest wall and lower neck: Thyroid, lymph nodes, and breast tissue   unremarkable.  Imaged upper abdomen: Unremarkable.  Musculoskeletal: Degenerative changes    Impression: Moderate-severe centrilobular emphysematous changes. No   pneumonia.
Subjective:  Desatted to 80s on room air at rest  Wants to try again  On oxygen currently  Denies SOB    Review of Systems:  All 10 systems reviewed in detailed and found to be negative with the exception of what has already been described above    Allergies:  codeine (Nausea)  sulfa drugs (Unknown)    Meds  MEDICATIONS  (STANDING):  azithromycin   Tablet 500 milliGRAM(s) Oral daily  buDESOnide   0.5 milliGRAM(s) Respule 0.5 milliGRAM(s) Inhalation two times a day  cholecalciferol 4000 Unit(s) Oral daily  enoxaparin Injectable 30 milliGRAM(s) SubCutaneous every 24 hours  hydrochlorothiazide 12.5 milliGRAM(s) Oral daily  ipratropium    for Nebulization 500 MICROGram(s) Nebulizer every 6 hours  methylPREDNISolone sodium succinate Injectable 30 milliGRAM(s) IV Push two times a day  propranolol LA 60 milliGRAM(s) Oral daily    MEDICATIONS  (PRN):  melatonin 5 milliGRAM(s) Oral at bedtime PRN Sleep  ondansetron Injectable 4 milliGRAM(s) IV Push every 6 hours PRN Nausea    Physical Exam  T(C): 36.4 (06-02-19 @ 11:12), Max: 36.9 (06-01-19 @ 17:29)  HR: 66 (06-02-19 @ 11:12) (66 - 86)  BP: 122/84 (06-02-19 @ 11:12) (122/84 - 163/59)  RR: 18 (06-02-19 @ 11:12) (18 - 96)  SpO2: 96% (06-02-19 @ 11:12) (89% - 97%)  Gen: Alert, oriented, no distress  HEENT: Anicteric sclera, moist mucous membranes, no JVD, no lymphadenopathy  Cardio: Regular rhythm and rate, normal S1S2, no murmurs  Resp: Wheezing bilaterally but improved  GI: Nontender, nondistended, normoactive bowel sounds  Ext: No cyanosis, clubbing or edema  Neuro: Nonfocal    Labs:    06-01    141  |  105  |  37<H>  ----------------------------<  170<H>  4.4   |  27  |  1.07    Ca    9.1      01 Jun 2019 07:23    < from: CT Chest No Cont (05.26.19 @ 14:06) >  NTERPRETATION:  Clinical Information: Cough and dyspnea.    Comparison: 06/17/2016    Procedure: Noncontrast CT of the chest with axial, sagittal, coronal, and   axialMIP reconstructions.    Findings:     Airways, pleura, lungs: Central airways patent. Are unremarkable. There   is moderate-severe centrilobular emphysematous disease. No pneumonia.   Scattered nodules measuring up to 4 mm.    Vasculature: Atherosclerosis including the aorta and coronary arteries.  Mediastinum and katya: Aortic valve calcification. Normal size heart.   Pericardium and esophagus unremarkable.  Chest wall and lower neck: Thyroid, lymph nodes, and breast tissue   unremarkable.  Imaged upper abdomen: Unremarkable.  Musculoskeletal: Degenerative changes    Impression: Moderate-severe centrilobular emphysematous changes. No   pneumonia.
Subjective:  Feels like her breathing has "turned a corner"  Off o2  Does not like taking steroids    Review of Systems:  All 10 systems reviewed in detailed and found to be negative with the exception of what has already been described above    Allergies:  codeine (Nausea)  sulfa drugs (Unknown)    Meds  MEDICATIONS  (STANDING):  azithromycin   Tablet 500 milliGRAM(s) Oral daily  buDESOnide   0.5 milliGRAM(s) Respule 0.5 milliGRAM(s) Inhalation two times a day  cholecalciferol 4000 Unit(s) Oral daily  enoxaparin Injectable 30 milliGRAM(s) SubCutaneous every 24 hours  hydrochlorothiazide 12.5 milliGRAM(s) Oral daily  ipratropium    for Nebulization 500 MICROGram(s) Nebulizer every 6 hours  methylPREDNISolone sodium succinate Injectable 30 milliGRAM(s) IV Push two times a day  propranolol LA 60 milliGRAM(s) Oral daily    MEDICATIONS  (PRN):  melatonin 5 milliGRAM(s) Oral at bedtime PRN Sleep  ondansetron Injectable 4 milliGRAM(s) IV Push every 6 hours PRN Nausea    Physical Exam  T(C): 36.4 (06-01-19 @ 11:00), Max: 36.9 (05-31-19 @ 17:30)  HR: 97 (06-01-19 @ 11:00) (76 - 121)  BP: 159/68 (06-01-19 @ 11:00) (140/58 - 168/82)  RR: 18 (06-01-19 @ 11:00) (16 - 18)  SpO2: 97% (06-01-19 @ 11:00) (91% - 97%)  Gen: Alert, oriented, no distress  HEENT: Anicteric sclera, moist mucous membranes, no JVD, no lymphadenopathy, good dentition  Cardio: Regular rhythm and rate, normal S1S2, no murmurs  Resp: Wheezing bilaterally  GI: Nontender, nondistended, normoactive bowel sounds  Ext: No cyanosis, clubbing or edema  Neuro: Nonfocal    Labs:    06-01    141  |  105  |  37<H>  ----------------------------<  170<H>  4.4   |  27  |  1.07    Ca    9.1      01 Jun 2019 07:23      < from: CT Chest No Cont (05.26.19 @ 14:06) >  NTERPRETATION:  Clinical Information: Cough and dyspnea.    Comparison: 06/17/2016    Procedure: Noncontrast CT of the chest with axial, sagittal, coronal, and   axialMIP reconstructions.    Findings:     Airways, pleura, lungs: Central airways patent. Are unremarkable. There   is moderate-severe centrilobular emphysematous disease. No pneumonia.   Scattered nodules measuring up to 4 mm.    Vasculature: Atherosclerosis including the aorta and coronary arteries.  Mediastinum and katya: Aortic valve calcification. Normal size heart.   Pericardium and esophagus unremarkable.  Chest wall and lower neck: Thyroid, lymph nodes, and breast tissue   unremarkable.  Imaged upper abdomen: Unremarkable.  Musculoskeletal: Degenerative changes    Impression: Moderate-severe centrilobular emphysematous changes. No   pneumonia.

## 2019-06-04 NOTE — PROGRESS NOTE ADULT - REASON FOR ADMISSION
acute copd

## 2019-06-04 NOTE — DISCHARGE NOTE PROVIDER - NSDCCPCAREPLAN_GEN_ALL_CORE_FT
PRINCIPAL DISCHARGE DIAGNOSIS  Diagnosis: COPD exacerbation  Assessment and Plan of Treatment: send to rehab with oxygen  continue prednisone slow taper, nebs, mucinex and advair      SECONDARY DISCHARGE DIAGNOSES  Diagnosis: Hypoxia  Assessment and Plan of Treatment:     Diagnosis: COPD exacerbation  Assessment and Plan of Treatment:

## 2019-06-04 NOTE — DISCHARGE NOTE PROVIDER - HOSPITAL COURSE
HOSPITALIST PROGRESS NOTE:    SUBJECTIVE:    PCP:    Chief Complaint: Patient is a 89y old  Female who presents with a chief complaint of acute copd (28 May 2019 14:45)            HPI:    89 y/o F PMHx of COPD, HTN, former smoker presents to ED BIBA from home worsening SOB with frequent coughing.  Patient states she was diagnosed with copd many years ago, but she has not had any flare up for many years and has been stable. She states she has a cough , non productive that will not linda.  Patient was hypoxic in ER mid 80s on arrival. She was given supplemntal neb, steroid, oxygen in ER which releived some of her symptoms. She denies any fevers, chills, n/v/d. (26 May 2019 15:05)        5/29: Improving; Less wheezing; No overnight events or complaints     5/30:  today patient is coughing, wheezing and short of breath more.     6/1: Yesterday patient was tachycardic and Albuterol was D/C; Patient was not getting her Propranolol since she has been here; + cough dyspnea improving; patient anxious about going home and paying for her Bills    6/2:  Patient has no complaints; Pulse ox on RA was 80%; Patient understands she cat need Home O2 No other events     6/3:  Patient has no complaints. Not improving much. seen by pulm and venous doppler ordered     6/4:  dimer and doppler neg;  Patient prefers to go home rather than going to rehab            89 y/o F PMHx of COPD, HTN, former smoker presents to ED BIBA from home worsening SOB with frequent coughing.  Patient states she was diagnosed with copd many years ago, but she has not had any flare up for many years and has been stable. She states she has a cough , non productive that will not linda.  Patient was hypoxic in ER mid 80s on arrival. She was given supplemntal neb, steroid, oxygen in ER which releived some of her symptoms. She denies any fevers, chills, n/v/d.    Found to be in acute respiratory distress and acute hypoxic resp failure.         *Acute exacerbation of copd with acute hypoxic resp failure    -CT no PNA    -taper solumederol 30 q12h now on prednisone taper    -c/w Ipratropium every 6 and Pulmicort     -pulmicort    -c/w chest PT    -c/w mucinex    -Pulmonary consult appreciated    -continue Zithromax    -Pulse Ox on RA 80% patient requires Home O2 on D/C    -Ddimer and Venous Doppler Neg        *HTN - uncontrolled    -resume home meds HCTZ and Propranolol        DVT prophy- Lovenox
Assessment and Plan:         89 y/o F PMHx of COPD, HTN, former smoker presents to ED BIBA from home worsening SOB with frequent coughing.  Patient states she was diagnosed with copd many years ago, but she has not had any flare up for many years and has been stable. She states she has a cough , non productive that will not linda.  Patient was hypoxic in ER mid 80s on arrival. She was given supplemntal neb, steroid, oxygen in ER which releived some of her symptoms. She denies any fevers, chills, n/v/d.    Found to be in acute respiratory distress and acute hypoxic resp failure.         *Acute exacerbation of copd with acute hypoxic resp failure    -CT no PNA    -tapered solumederol 40 q12h.     -c/w duonebs every 6    -pulmicort    -c/w chest PT    -c/w mucinex    -Pulmonary consult appreciated    -add Zithromax    -FU Pulse Ox on Ambulation        DVT prophy- sc heparin

## 2019-06-04 NOTE — DISCHARGE NOTE PROVIDER - CARE PROVIDER_API CALL
Jessica Werner (MD)  Critical Care Medicine; Internal Medicine; Pulmonary Disease; Sleep Medicine  [address] [place] [address]  Phone: [phone]  Fax: [phone]  Follow Up Time:

## 2019-06-04 NOTE — PROGRESS NOTE ADULT - NSHPATTENDINGPLANDISCUSS_GEN_ALL_CORE
Patient, nurse
patient, nursing, staff
Patient, nurse

## 2019-06-04 NOTE — DISCHARGE NOTE PROVIDER - NSDCHC_MEDRECSTATUS_GEN_ALL_CORE
Admission Reconciliation is Completed  Discharge Reconciliation is Completed
Admission Reconciliation is Completed  Discharge Reconciliation is Not Complete

## 2019-06-04 NOTE — PROGRESS NOTE ADULT - ASSESSMENT
89 y/o F PMHx of COPD, HTN, former smoker presents to ED BIBA from home worsening SOB with frequent coughing.  Patient states she was diagnosed with copd many years ago, but she has not had any flare up for many years and has been stable. She states she has a cough , non productive that will not linda.  Patient was hypoxic in ER mid 80s on arrival. She was given supplemntal neb, steroid, oxygen in ER which releived some of her symptoms. She denies any fevers, chills, n/v/d.  Found to be in acute respiratory distress and acute hypoxic resp failure.     *Acute exacerbation of copd with acute hypoxic resp failure  -CT no PNA  -tapered solumederol 40 q12h.   -c/w duonebs every 6  -pulmicort  -c/w chest PT  -c/w mucinex  -Pulmonary consult appreciated  -add Zithromax  -FU Pulse Ox on Ambulation    DVT prophy- sc heparin
- COPD exacerbation  - mild hyperglycemia from steroids   - history of hiatal and diaphragmatic hernia     PLAN   - Continue methylprednisolone 30 mg IV BID  - continue wazithromycin   - continue pulmicort BID  - Restart advair and spiriva as an outpatient  - ambulatory pulse ox eval on d/c
- COPD exacerbation  - mild hyperglycemia from steroids   - history of hiatal and diaphragmatic hernia   - hypoxia    PLAN   - Continue methylprednisolone 30 mg IV BID  - recheck CXR  - continue azithromycin   - continue pulmicort BID  - Restart advair and spiriva as an outpatient  - ambulatory pulse ox eval on d/c
- copd with symptoms of exacerbation patient is still symptomatic   - emphysema noted in the ct scan of chest   - No gross pneumonia noted in the ct scan of the chest   - mild hyperglycemia with the steroids   - history of hiatal and diaphragmatic hernia     PLAN     - continue with the solumedrol iv today as the patient still wheezing and slow taper as the patient continue to improve   - continue with the antibiotic with azithromycin   - continue with the Pulmicort during the hospital stay . and use of advair and spiriva as an out patient her baseline medications   - check the sat on the room air and with the ambulation for the need of the home 02 therapy   - symptomatic relief for the cough   - monitor BGM and  hyperglycemia likely related with the steroids   - monitor closely for the improvement
- copd with symptoms of exacerbation patient is still symptomatic with slow improvement in the symptoms   - emphysema noted in the ct scan of chest   - feels difficutly in expectoration and not taking the mucomyst   - mild hyperglycemia with the steroids   - history of hiatal and diaphragmatic hernia   - normal di dimer assay with the low clinical suspicion makes PE un likely     PLAN     - continue with the solumedrol iv today and change to po prednisone 40 mg daily with the slow taper   - discontinue azithromycin after the today dose   - continue with the  symbicort and change atrovent to duo neb as there is no further tachycardia   - discontinue mucomyst  and continue  mucinex with the difficult in expectoration   - symptomatic relief for the cough   - discharge planning as the patient continue to improve with the  involvement with the possible rehab
- copd with symptoms of exacerbation patient is still symptomatic with the hypoxia on the room air and with the ambulation   - emphysema noted in the ct scan of chest   - feels difficutly in expectoration   - mild hyperglycemia with the steroids   - history of hiatal and diaphragmatic hernia     PLAN     - continue with the solumedrol iv today and change to po prednisone from tomorrow   - discontinue azithromycin after the A.M dose tomorrow   - would discontinue pulmicort and add symbicort for the copd   - add mucomyst and mucinex with the difficult in expectoration   - symptomatic relief for the cough   - obtain venous duplex with the leg discomfort   - consider discharge to rehab and would need 02 at discharge   - so far patient is on betablocker which was restarted might consider lower dose with the on going copd exacerbation .  - anticipate discharge in the next 24 to 48 hours if remained stable
89 y/o F PMHx of COPD, HTN, former smoker presents to ED BIBA from home worsening SOB with frequent coughing.  Patient states she was diagnosed with copd many years ago, but she has not had any flare up for many years and has been stable. She states she has a cough , non productive that will not linda.  Patient was hypoxic in ER mid 80s on arrival. She was given supplemntal neb, steroid, oxygen in ER which releived some of her symptoms. She denies any fevers, chills, n/v/d.  Found to be in acute respiratory distress and acute hypoxic resp failure.     *Acute exacerbation of copd with acute hypoxic resp failure  -CT no PNA  -taper solumederol 30 q12h.   -c/w Ipratropium every 6 and Pulmicort   -pulmicort  -c/w chest PT  -c/w mucinex  -Pulmonary consult appreciated  -continue Zithromax  -Pulse Ox on RA 80% patient requires Home O2 on D/C  -Ddimer and Venous Doppler Neg    *HTN - uncontrolled  -resume home meds HCTZ and Propranolol    DVT prophy- Lovenox    *Dispo - Patient refused rehab; Will go home with oxygen; High risk for readmission
89 y/o F PMHx of COPD, HTN, former smoker presents to ED BIBA from home worsening SOB with frequent coughing.  Patient states she was diagnosed with copd many years ago, but she has not had any flare up for many years and has been stable. She states she has a cough , non productive that will not linda.  Patient was hypoxic in ER mid 80s on arrival. She was given supplemntal neb, steroid, oxygen in ER which releived some of her symptoms. She denies any fevers, chills, n/v/d.  Found to be in acute respiratory distress and acute hypoxic resp failure.     *Acute exacerbation of copd with acute hypoxic resp failure  -CT no PNA  -tapered solumederol 40 q12h.   -c/w duonebs every 6  -pulmicort  -c/w chest PT  -c/w mucinex  -Pulmonary consult     DVT prophy- sc heparin
89 y/o F PMHx of COPD, HTN, former smoker presents to ED BIBA from home worsening SOB with frequent coughing.  Patient states she was diagnosed with copd many years ago, but she has not had any flare up for many years and has been stable. She states she has a cough , non productive that will not linda.  Patient was hypoxic in ER mid 80s on arrival. She was given supplemntal neb, steroid, oxygen in ER which releived some of her symptoms. She denies any fevers, chills, n/v/d.  Found to be in acute respiratory distress and acute hypoxic resp failure.     *Acute exacerbation of copd with acute hypoxic resp failure  -CT no PNA  -taper solumederol 30 q12h.   -c/w Ipratropium every 6 and Pulmicort   -pulmicort  -c/w chest PT  -c/w mucinex  -Pulmonary consult appreciated  -continue Zithromax  -FU Pulse Ox on Ambulation     *HTN - uncontrolled  -resume home meds HCTZ and Propranolol    DVT prophy- sc heparin
89 y/o F PMHx of COPD, HTN, former smoker presents to ED BIBA from home worsening SOB with frequent coughing.  Patient states she was diagnosed with copd many years ago, but she has not had any flare up for many years and has been stable. She states she has a cough , non productive that will not linda.  Patient was hypoxic in ER mid 80s on arrival. She was given supplemntal neb, steroid, oxygen in ER which releived some of her symptoms. She denies any fevers, chills, n/v/d.  Found to be in acute respiratory distress and acute hypoxic resp failure.     *Acute exacerbation of copd with acute hypoxic resp failure  -CT no PNA  -taper solumederol 30 q12h.   -c/w Ipratropium every 6 and Pulmicort   -pulmicort  -c/w chest PT  -c/w mucinex  -Pulmonary consult appreciated  -continue Zithromax  -Pulse Ox on RA 80% patient requires Home O2 on D/C  -FU repeat CXR    *HTN - uncontrolled  -resume home meds HCTZ and Propranolol    DVT prophy- sc heparin
89 y/o F PMHx of COPD, HTN, former smoker presents to ED BIBA from home worsening SOB with frequent coughing.  Patient states she was diagnosed with copd many years ago, but she has not had any flare up for many years and has been stable. She states she has a cough , non productive that will not linda.  Patient was hypoxic in ER mid 80s on arrival. She was given supplemntal neb, steroid, oxygen in ER which releived some of her symptoms. She denies any fevers, chills, n/v/d.  Found to be in acute respiratory distress and acute hypoxic resp failure.     *Acute exacerbation of copd with acute hypoxic resp failure  -CT no PNA  -taper solumederol 30 q12h.   -c/w Ipratropium every 6 and Pulmicort   -pulmicort  -c/w chest PT  -c/w mucinex  -Pulmonary consult appreciated  -continue Zithromax  -Pulse Ox on RA 80% patient requires Home O2 on D/C  -Not improving - FU Ddimer and Venous Doppler; if + will obtain CTA     *HTN - uncontrolled  -resume home meds HCTZ and Propranolol    DVT prophy- Lovenox    *Dispo - Possible rehav with oxygen

## 2019-06-10 DIAGNOSIS — H40.9 UNSPECIFIED GLAUCOMA: ICD-10-CM

## 2019-06-10 DIAGNOSIS — J43.9 EMPHYSEMA, UNSPECIFIED: ICD-10-CM

## 2019-06-10 DIAGNOSIS — T38.0X5A ADVERSE EFFECT OF GLUCOCORTICOIDS AND SYNTHETIC ANALOGUES, INITIAL ENCOUNTER: ICD-10-CM

## 2019-06-10 DIAGNOSIS — R63.6 UNDERWEIGHT: ICD-10-CM

## 2019-06-10 DIAGNOSIS — R73.9 HYPERGLYCEMIA, UNSPECIFIED: ICD-10-CM

## 2019-06-10 DIAGNOSIS — J96.01 ACUTE RESPIRATORY FAILURE WITH HYPOXIA: ICD-10-CM

## 2019-06-10 DIAGNOSIS — G25.0 ESSENTIAL TREMOR: ICD-10-CM

## 2019-06-10 DIAGNOSIS — I10 ESSENTIAL (PRIMARY) HYPERTENSION: ICD-10-CM

## 2019-06-10 DIAGNOSIS — M19.91 PRIMARY OSTEOARTHRITIS, UNSPECIFIED SITE: ICD-10-CM

## 2019-09-04 ENCOUNTER — OUTPATIENT (OUTPATIENT)
Dept: OUTPATIENT SERVICES | Facility: HOSPITAL | Age: 84
LOS: 1 days | End: 2019-09-04
Payer: MEDICARE

## 2019-09-04 DIAGNOSIS — J44.1 CHRONIC OBSTRUCTIVE PULMONARY DISEASE WITH (ACUTE) EXACERBATION: ICD-10-CM

## 2019-09-04 DIAGNOSIS — R05 COUGH: ICD-10-CM

## 2019-09-04 PROCEDURE — 71250 CT THORAX DX C-: CPT | Mod: 26

## 2019-09-04 PROCEDURE — 71250 CT THORAX DX C-: CPT

## 2019-09-05 DIAGNOSIS — R05 COUGH: ICD-10-CM

## 2019-09-05 DIAGNOSIS — J44.1 CHRONIC OBSTRUCTIVE PULMONARY DISEASE WITH (ACUTE) EXACERBATION: ICD-10-CM

## 2019-12-24 ENCOUNTER — INPATIENT (INPATIENT)
Facility: HOSPITAL | Age: 84
LOS: 4 days | Discharge: HOME HEALTH | DRG: 190 | End: 2019-12-29
Attending: HOSPITALIST | Admitting: HOSPITALIST
Payer: MEDICARE

## 2019-12-24 VITALS
DIASTOLIC BLOOD PRESSURE: 68 MMHG | HEART RATE: 76 BPM | OXYGEN SATURATION: 100 % | RESPIRATION RATE: 16 BRPM | HEIGHT: 57 IN | TEMPERATURE: 97 F | WEIGHT: 89.95 LBS | SYSTOLIC BLOOD PRESSURE: 107 MMHG

## 2019-12-24 LAB
ADD ON TEST-SPECIMEN IN LAB: SIGNIFICANT CHANGE UP
ALBUMIN SERPL ELPH-MCNC: 3.4 G/DL — SIGNIFICANT CHANGE UP (ref 3.3–5)
ALP SERPL-CCNC: 61 U/L — SIGNIFICANT CHANGE UP (ref 40–120)
ALT FLD-CCNC: 15 U/L — SIGNIFICANT CHANGE UP (ref 12–78)
ANION GAP SERPL CALC-SCNC: 7 MMOL/L — SIGNIFICANT CHANGE UP (ref 5–17)
APPEARANCE UR: CLEAR — SIGNIFICANT CHANGE UP
APTT BLD: 27.9 SEC — SIGNIFICANT CHANGE UP (ref 27.5–36.3)
AST SERPL-CCNC: 35 U/L — SIGNIFICANT CHANGE UP (ref 15–37)
B PERT DNA SPEC QL NAA+PROBE: SIGNIFICANT CHANGE UP
BILIRUB SERPL-MCNC: 0.5 MG/DL — SIGNIFICANT CHANGE UP (ref 0.2–1.2)
BILIRUB UR-MCNC: NEGATIVE — SIGNIFICANT CHANGE UP
BUN SERPL-MCNC: 31 MG/DL — HIGH (ref 7–23)
C PNEUM DNA SPEC QL NAA+PROBE: SIGNIFICANT CHANGE UP
CALCIUM SERPL-MCNC: 9 MG/DL — SIGNIFICANT CHANGE UP (ref 8.5–10.1)
CHLORIDE SERPL-SCNC: 98 MMOL/L — SIGNIFICANT CHANGE UP (ref 96–108)
CO2 SERPL-SCNC: 31 MMOL/L — SIGNIFICANT CHANGE UP (ref 22–31)
COLOR SPEC: YELLOW — SIGNIFICANT CHANGE UP
CREAT SERPL-MCNC: 1.25 MG/DL — SIGNIFICANT CHANGE UP (ref 0.5–1.3)
DIFF PNL FLD: ABNORMAL
FLUAV H1 2009 PAND RNA SPEC QL NAA+PROBE: SIGNIFICANT CHANGE UP
FLUAV H1 RNA SPEC QL NAA+PROBE: SIGNIFICANT CHANGE UP
FLUAV H3 RNA SPEC QL NAA+PROBE: SIGNIFICANT CHANGE UP
FLUAV SUBTYP SPEC NAA+PROBE: SIGNIFICANT CHANGE UP
FLUBV RNA SPEC QL NAA+PROBE: SIGNIFICANT CHANGE UP
GLUCOSE SERPL-MCNC: 134 MG/DL — HIGH (ref 70–99)
GLUCOSE UR QL: NEGATIVE MG/DL — SIGNIFICANT CHANGE UP
HADV DNA SPEC QL NAA+PROBE: SIGNIFICANT CHANGE UP
HCOV PNL SPEC NAA+PROBE: SIGNIFICANT CHANGE UP
HCT VFR BLD CALC: 40.9 % — SIGNIFICANT CHANGE UP (ref 34.5–45)
HGB BLD-MCNC: 13.6 G/DL — SIGNIFICANT CHANGE UP (ref 11.5–15.5)
HMPV RNA SPEC QL NAA+PROBE: SIGNIFICANT CHANGE UP
HPIV1 RNA SPEC QL NAA+PROBE: SIGNIFICANT CHANGE UP
HPIV2 RNA SPEC QL NAA+PROBE: SIGNIFICANT CHANGE UP
HPIV3 RNA SPEC QL NAA+PROBE: SIGNIFICANT CHANGE UP
HPIV4 RNA SPEC QL NAA+PROBE: SIGNIFICANT CHANGE UP
INR BLD: 1.09 RATIO — SIGNIFICANT CHANGE UP (ref 0.88–1.16)
KETONES UR-MCNC: NEGATIVE — SIGNIFICANT CHANGE UP
LEUKOCYTE ESTERASE UR-ACNC: ABNORMAL
MAGNESIUM SERPL-MCNC: 1.8 MG/DL — SIGNIFICANT CHANGE UP (ref 1.6–2.6)
MCHC RBC-ENTMCNC: 29.4 PG — SIGNIFICANT CHANGE UP (ref 27–34)
MCHC RBC-ENTMCNC: 33.3 GM/DL — SIGNIFICANT CHANGE UP (ref 32–36)
MCV RBC AUTO: 88.3 FL — SIGNIFICANT CHANGE UP (ref 80–100)
NITRITE UR-MCNC: NEGATIVE — SIGNIFICANT CHANGE UP
NT-PROBNP SERPL-SCNC: 4105 PG/ML — HIGH (ref 0–450)
PH UR: 5 — SIGNIFICANT CHANGE UP (ref 5–8)
PLATELET # BLD AUTO: 195 K/UL — SIGNIFICANT CHANGE UP (ref 150–400)
POTASSIUM SERPL-MCNC: 3.2 MMOL/L — LOW (ref 3.5–5.3)
POTASSIUM SERPL-SCNC: 3.2 MMOL/L — LOW (ref 3.5–5.3)
PROT SERPL-MCNC: 7.9 GM/DL — SIGNIFICANT CHANGE UP (ref 6–8.3)
PROT UR-MCNC: 100 MG/DL
PROTHROM AB SERPL-ACNC: 12.1 SEC — SIGNIFICANT CHANGE UP (ref 10–12.9)
RAPID RVP RESULT: DETECTED
RBC # BLD: 4.63 M/UL — SIGNIFICANT CHANGE UP (ref 3.8–5.2)
RBC # FLD: 15 % — HIGH (ref 10.3–14.5)
RSV RNA SPEC QL NAA+PROBE: DETECTED
RV+EV RNA SPEC QL NAA+PROBE: SIGNIFICANT CHANGE UP
SODIUM SERPL-SCNC: 136 MMOL/L — SIGNIFICANT CHANGE UP (ref 135–145)
SP GR SPEC: 1.02 — SIGNIFICANT CHANGE UP (ref 1.01–1.02)
TROPONIN I SERPL-MCNC: <0.015 NG/ML — SIGNIFICANT CHANGE UP (ref 0.01–0.04)
UROBILINOGEN FLD QL: NEGATIVE MG/DL — SIGNIFICANT CHANGE UP
WBC # BLD: 15.64 K/UL — HIGH (ref 3.8–10.5)
WBC # FLD AUTO: 15.64 K/UL — HIGH (ref 3.8–10.5)

## 2019-12-24 PROCEDURE — 83036 HEMOGLOBIN GLYCOSYLATED A1C: CPT

## 2019-12-24 PROCEDURE — 85027 COMPLETE CBC AUTOMATED: CPT

## 2019-12-24 PROCEDURE — G0378: CPT

## 2019-12-24 PROCEDURE — 99285 EMERGENCY DEPT VISIT HI MDM: CPT

## 2019-12-24 PROCEDURE — 86609 BACTERIUM ANTIBODY: CPT

## 2019-12-24 PROCEDURE — 84100 ASSAY OF PHOSPHORUS: CPT

## 2019-12-24 PROCEDURE — 83735 ASSAY OF MAGNESIUM: CPT

## 2019-12-24 PROCEDURE — 94760 N-INVAS EAR/PLS OXIMETRY 1: CPT

## 2019-12-24 PROCEDURE — 96374 THER/PROPH/DIAG INJ IV PUSH: CPT

## 2019-12-24 PROCEDURE — 71045 X-RAY EXAM CHEST 1 VIEW: CPT | Mod: 26

## 2019-12-24 PROCEDURE — 82962 GLUCOSE BLOOD TEST: CPT

## 2019-12-24 PROCEDURE — 99285 EMERGENCY DEPT VISIT HI MDM: CPT | Mod: 25

## 2019-12-24 PROCEDURE — 94640 AIRWAY INHALATION TREATMENT: CPT

## 2019-12-24 PROCEDURE — 85025 COMPLETE CBC W/AUTO DIFF WBC: CPT

## 2019-12-24 PROCEDURE — 71250 CT THORAX DX C-: CPT | Mod: 26

## 2019-12-24 PROCEDURE — 96372 THER/PROPH/DIAG INJ SC/IM: CPT | Mod: XU

## 2019-12-24 PROCEDURE — 80048 BASIC METABOLIC PNL TOTAL CA: CPT

## 2019-12-24 PROCEDURE — 96375 TX/PRO/DX INJ NEW DRUG ADDON: CPT

## 2019-12-24 PROCEDURE — 80053 COMPREHEN METABOLIC PANEL: CPT

## 2019-12-24 PROCEDURE — 93306 TTE W/DOPPLER COMPLETE: CPT

## 2019-12-24 PROCEDURE — 94668 MNPJ CHEST WALL SBSQ: CPT

## 2019-12-24 PROCEDURE — 86317 IMMUNOASSAY INFECTIOUS AGENT: CPT

## 2019-12-24 PROCEDURE — 36415 COLL VENOUS BLD VENIPUNCTURE: CPT

## 2019-12-24 RX ORDER — MULTIVIT-MIN/FERROUS GLUCONATE 9 MG/15 ML
1 LIQUID (ML) ORAL DAILY
Refills: 0 | Status: DISCONTINUED | OUTPATIENT
Start: 2019-12-24 | End: 2019-12-29

## 2019-12-24 RX ORDER — AZITHROMYCIN 500 MG/1
TABLET, FILM COATED ORAL
Refills: 0 | Status: DISCONTINUED | OUTPATIENT
Start: 2019-12-24 | End: 2019-12-29

## 2019-12-24 RX ORDER — ACETAMINOPHEN 500 MG
650 TABLET ORAL EVERY 6 HOURS
Refills: 0 | Status: DISCONTINUED | OUTPATIENT
Start: 2019-12-24 | End: 2019-12-29

## 2019-12-24 RX ORDER — HYDROCHLOROTHIAZIDE 25 MG
12.5 TABLET ORAL DAILY
Refills: 0 | Status: DISCONTINUED | OUTPATIENT
Start: 2019-12-24 | End: 2019-12-29

## 2019-12-24 RX ORDER — CHOLECALCIFEROL (VITAMIN D3) 125 MCG
2000 CAPSULE ORAL DAILY
Refills: 0 | Status: DISCONTINUED | OUTPATIENT
Start: 2019-12-24 | End: 2019-12-29

## 2019-12-24 RX ORDER — HEPARIN SODIUM 5000 [USP'U]/ML
5000 INJECTION INTRAVENOUS; SUBCUTANEOUS EVERY 8 HOURS
Refills: 0 | Status: DISCONTINUED | OUTPATIENT
Start: 2019-12-24 | End: 2019-12-29

## 2019-12-24 RX ORDER — POTASSIUM CHLORIDE 20 MEQ
10 PACKET (EA) ORAL
Refills: 0 | Status: COMPLETED | OUTPATIENT
Start: 2019-12-24 | End: 2019-12-24

## 2019-12-24 RX ORDER — INSULIN LISPRO 100/ML
VIAL (ML) SUBCUTANEOUS
Refills: 0 | Status: DISCONTINUED | OUTPATIENT
Start: 2019-12-24 | End: 2019-12-24

## 2019-12-24 RX ORDER — AZITHROMYCIN 500 MG/1
250 TABLET, FILM COATED ORAL EVERY 24 HOURS
Refills: 0 | Status: DISCONTINUED | OUTPATIENT
Start: 2019-12-25 | End: 2019-12-29

## 2019-12-24 RX ORDER — PROPRANOLOL HCL 160 MG
60 CAPSULE, EXTENDED RELEASE 24HR ORAL DAILY
Refills: 0 | Status: DISCONTINUED | OUTPATIENT
Start: 2019-12-24 | End: 2019-12-29

## 2019-12-24 RX ORDER — TIOTROPIUM BROMIDE 18 UG/1
1 CAPSULE ORAL; RESPIRATORY (INHALATION) DAILY
Refills: 0 | Status: DISCONTINUED | OUTPATIENT
Start: 2019-12-24 | End: 2019-12-26

## 2019-12-24 RX ORDER — IPRATROPIUM/ALBUTEROL SULFATE 18-103MCG
3 AEROSOL WITH ADAPTER (GRAM) INHALATION EVERY 6 HOURS
Refills: 0 | Status: DISCONTINUED | OUTPATIENT
Start: 2019-12-24 | End: 2019-12-29

## 2019-12-24 RX ORDER — AZITHROMYCIN 500 MG/1
500 TABLET, FILM COATED ORAL ONCE
Refills: 0 | Status: COMPLETED | OUTPATIENT
Start: 2019-12-24 | End: 2019-12-24

## 2019-12-24 RX ORDER — CEFTRIAXONE 500 MG/1
1000 INJECTION, POWDER, FOR SOLUTION INTRAMUSCULAR; INTRAVENOUS EVERY 24 HOURS
Refills: 0 | Status: DISCONTINUED | OUTPATIENT
Start: 2019-12-24 | End: 2019-12-29

## 2019-12-24 RX ORDER — LACTOBACILLUS ACIDOPHILUS 100MM CELL
1 CAPSULE ORAL DAILY
Refills: 0 | Status: DISCONTINUED | OUTPATIENT
Start: 2019-12-24 | End: 2019-12-29

## 2019-12-24 RX ORDER — IPRATROPIUM/ALBUTEROL SULFATE 18-103MCG
3 AEROSOL WITH ADAPTER (GRAM) INHALATION
Refills: 0 | Status: COMPLETED | OUTPATIENT
Start: 2019-12-24 | End: 2019-12-24

## 2019-12-24 RX ORDER — ASPIRIN/CALCIUM CARB/MAGNESIUM 324 MG
162 TABLET ORAL ONCE
Refills: 0 | Status: COMPLETED | OUTPATIENT
Start: 2019-12-24 | End: 2019-12-24

## 2019-12-24 RX ADMIN — Medication 162 MILLIGRAM(S): at 16:34

## 2019-12-24 RX ADMIN — Medication 100 MILLIEQUIVALENT(S): at 20:27

## 2019-12-24 RX ADMIN — Medication 100 MILLIEQUIVALENT(S): at 22:07

## 2019-12-24 RX ADMIN — Medication 3 MILLILITER(S): at 15:13

## 2019-12-24 RX ADMIN — CEFTRIAXONE 1000 MILLIGRAM(S): 500 INJECTION, POWDER, FOR SOLUTION INTRAMUSCULAR; INTRAVENOUS at 20:27

## 2019-12-24 RX ADMIN — Medication 125 MILLIGRAM(S): at 16:34

## 2019-12-24 RX ADMIN — Medication 3 MILLILITER(S): at 16:05

## 2019-12-24 RX ADMIN — Medication 3 MILLILITER(S): at 15:33

## 2019-12-24 RX ADMIN — HEPARIN SODIUM 5000 UNIT(S): 5000 INJECTION INTRAVENOUS; SUBCUTANEOUS at 20:48

## 2019-12-24 NOTE — H&P ADULT - NSICDXFAMILYHX_GEN_ALL_CORE_FT
FAMILY HISTORY:  Patient's father is , pt cannot recall med hx  Patient's mother is , pt cannot recall med hx

## 2019-12-24 NOTE — H&P ADULT - NSHPSOCIALHISTORY_GEN_ALL_CORE
uses rollator  ADLs partially dependant on daughter for cooking, cleaning  no etoh, smoking, drug use

## 2019-12-24 NOTE — CONSULT NOTE ADULT - SUBJECTIVE AND OBJECTIVE BOX
Pulmonary Consult      Reason for Admission: COPD exacerbation	  History of Present Illness: 	  Pt is a 89 yo female with a pmh/o PVD with chronic skin changes, COPD/Emphysema on home O2 only PRN, HTN, hyperglycemia on steroids, CKD, who present to ED for worsening shortness of breath associated with productive cough of yellow green sputum which she describes as thick, runny nose, body aches. Pt states her daughter had been taking care of her at home and had been suffering from a cold. Pt states daughter left Saturday and that night is when pt sx began. Pt states she continued to become gradually more SOB and needed to use her oxygen last night for the first time ever. Pt states she was given home O2 after leaving [facility] for PRN use. Pt also endorses decreased appetite and two days of urinary hesitancy, frequency, and dysuria for which she was using cranberry juice at home to treat as she states she is prone to UTI. Pt denies leg swelling, chest pain, n/v/d, orthopnea, h/o CHF, palpitations, abd pain, back pain, fever, chills, diaphoresis.     above history of present illness noted   patient has says she is sob and has cough and wheezing   she has severe copd at baseline and started to use 02 with the recent on set of symptoms     PAST MEDICAL & SURGICAL HISTORY:  Glaucoma  Osteoarthritis  Benign essential tremor  COPD (chronic obstructive pulmonary disease)  Hypertension  Ankle injury  Dislocated intraocular lens: right eye  Glaucoma filtering bleb of both eyes        FAMILY HISTORY:  Patient's father is : pt cannot recall med hx  Patient's mother is : pt cannot recall med hx      SOCIAL HISTORY:    exsmoker quit many years ago     Allergies    sulfa drugs (Unknown)    Intolerances    codeine (Nausea)        REVIEW OF SYSTEMS:  Constitutional: weakness and history of falls    Eyes: No itching or discharge from the eyes  ENT:  No post nasal drip. No epistaxis. No throat pain. No sore throat. No difficulty swallowing.   CV: No chest pain. No palpitations. No lightheadedness or dizziness.   Resp: positive for the cough and wheezing and sob   GI: No nausea. No vomiting. No diarrhea or abdominal pain   MSK: No joint pain or pain in any extremities  Integumentary: No skin lesions. No pedal edema.  Neurological: No gross motor weakness. No sensory changes.      OBJECTIVE:  Vital Signs Last 24 Hrs  T(C): 36.4 (25 Dec 2019 05:22), Max: 36.7 (24 Dec 2019 19:05)  T(F): 97.5 (25 Dec 2019 05:22), Max: 98.1 (24 Dec 2019 19:05)  HR: 68 (25 Dec 2019 07:42) (66 - 82)  BP: 138/60 (25 Dec 2019 05:22) (107/68 - 143/67)  BP(mean): --  RR: 18 (25 Dec 2019 05:22) (16 - 18)  SpO2: 97% (25 Dec 2019 05:22) (97% - 100%)      PHYSICAL EXAM:  General: Awake, alert, oriented X 3.   HEENT: Atraumatic, normocephalic.   Neck: No JVD no lymphadenopathy   Respiratory: normal vesicular breathing has diffuse bilateral wheeze   Cardiovascular: S1 S2 normal. No murmurs, rubs or gallops.   Abdomen: Soft, non-tender, non-distended. No organomegaly.  Extremities: Warm to touch. Peripheral pulse palpable. No pedal edema.   Skin: No rashes or skin lesions  Neurological: Motor and sensory examination equal and normal in all four extremities.  Psychiatry: Appropriate mood and affect.    HOSPITAL MEDICATIONS:  MEDICATIONS  (STANDING):  albuterol/ipratropium for Nebulization 3 milliLiter(s) Nebulizer every 6 hours  azithromycin  IVPB      azithromycin  IVPB 250 milliGRAM(s) IV Intermittent every 24 hours  cefTRIAXone Injectable. 1000 milliGRAM(s) IV Push every 24 hours  cholecalciferol 2000 Unit(s) Oral daily  heparin  Injectable 5000 Unit(s) SubCutaneous every 8 hours  hydrochlorothiazide 12.5 milliGRAM(s) Oral daily  lactobacillus acidophilus 1 Tablet(s) Oral daily  methylPREDNISolone sodium succinate Injectable 40 milliGRAM(s) IV Push every 6 hours  multivitamin/minerals 1 Tablet(s) Oral daily  propranolol LA 60 milliGRAM(s) Oral daily  tiotropium 18 MICROgram(s) Capsule 1 Capsule(s) Inhalation daily    MEDICATIONS  (PRN):  acetaminophen   Tablet .. 650 milliGRAM(s) Oral every 6 hours PRN Temp greater or equal to 38C (100.4F), Mild Pain (1 - 3)      LABS:                        13.6   15.64 )-----------( 195      ( 24 Dec 2019 15:47 )             40.9     12-24    136  |  98  |  31<H>  ----------------------------<  134<H>  3.2<L>   |  31  |  1.25    Ca    9.0      24 Dec 2019 15:47  Mg     1.8         TPro  7.9  /  Alb  3.4  /  TBili  0.5  /  DBili  x   /  AST  35  /  ALT  15  /  AlkPhos  61  12    PT/INR - ( 24 Dec 2019 15:47 )   PT: 12.1 sec;   INR: 1.09 ratio         PTT - ( 24 Dec 2019 15:47 )  PTT:27.9 sec  Urinalysis Basic - ( 24 Dec 2019 15:47 )    Color: Yellow / Appearance: Clear / S.020 / pH: x  Gluc: x / Ketone: Negative  / Bili: Negative / Urobili: Negative mg/dL   Blood: x / Protein: 100 mg/dL / Nitrite: Negative   Leuk Esterase: Moderate / RBC: 6-10 /HPF / WBC >50   Sq Epi: x / Non Sq Epi: x / Bacteria: x          < from: CT Chest No Cont (19 @ 21:53) >  FINDINGS:    LUNGS AND AIRWAYS: Bronchial wall thickening with areas of mucous plugging in both lower lobes.  Emphysematous changes. 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 Opacity again seen at the lateral aspect of the right lower lobe (series 2 image 77) which was present on 2019 and 2019, not significantly changed in size since 2019 (some areas of consolidation are increased in density). Opacities in the left lower lobe have improved since 2019. Calcified granulomas and scattered noncalcified pulmonary nodules are unchanged since 2019. Opacity/nodules at the left lung apex are unchanged since 2019 (series 2 image21).    PLEURA: No pleural effusion.    MEDIASTINUM AND ELIO: Enlarged lymph nodes including a 1.4 x 1.2 cm precarinal lymph node, previously normal in size measuring 1.0 x 0.7 cm (series 2 image 44).    VESSELS: Thoracic aorta normal in caliber withmild to moderate calcified plaque. Coronary artery calcifications.    HEART: Heart size is normal. No pericardial effusion.    CHEST WALL AND LOWER NECK: No enlarged axillary lymph nodes.    VISUALIZED UPPER ABDOMEN: Extensive vascular calcificationsare noted. Tortuous abdominal aorta.    BONES: No aggressive osseous lesion.    IMPRESSION:     Right lower lobe opacity, not significantly changed in size since 2019 with some areas increased in density, differential includes atelectasis or pneumonia.    Interval improvement in left lower lobe opacities since 2019.    Bilateral bronchial wall thickening with areas of mucous plugging in both lower lobes; differential includes reactive airways disease or bronchitis.    Mildly enlarged mediastinal lymph nodes, new since 2019.    Continued follow-up is recommended.

## 2019-12-24 NOTE — ED PROVIDER NOTE - CPE EDP GASTRO NORM
Treatment #11   Treatment Time: 60 minutes  Precautions:        Charges: 1 billed [id number]  Pain: 0/10        Diagnosis: Oral Dysfunction          Subjective: Dilan Santo came to therapy with mom and brought his kit.  Dilan Santo is still getting over strep throat and is on normal...

## 2019-12-24 NOTE — ED PROVIDER NOTE - NS_ ATTENDINGSCRIBEDETAILS _ED_A_ED_FT
I Corey Weinberg MD saw and examined the patient. Scribe documented for me and under my supervision. I have modified the scribe's documentation where necessary to reflect my history, physical exam and other relevant documentations pertinent to the care of the patient.

## 2019-12-24 NOTE — H&P ADULT - ATTENDING COMMENTS
17 min of time spent discussing advanced care planning including code status, existence of health care proxy. Pt is full code. Pt states her daughter is to make medical decisions on her behalf should she become unable.

## 2019-12-24 NOTE — ED PROVIDER NOTE - CLINICAL SUMMARY MEDICAL DECISION MAKING FREE TEXT BOX
As per Doctor Werner, Pt to be admitted for pulmonary COPD exacerbation, and for further evaluation.

## 2019-12-24 NOTE — ED PROVIDER NOTE - RESPIRATORY, MLM
+B/l crackles and wheezing at the base of the lungs. +B/l crackles and wheezing at the base of the lungs. No retractions.

## 2019-12-24 NOTE — ED PROVIDER NOTE - OBJECTIVE STATEMENT
89 y/o female with PMHx of glaucoma, OA, benign essential tremor, COPD, HTN, dislocated intraocular lens presents to the ED BIBA c/o SOB, cough. Pt states that she suddenly began coughing 3 days PTA, and has been worsening. EMS gave pt 1 nebulizer treatment PTA to ED. Denies abd pain, or cardiac surgery or hx. Never used a BiPAP mask before. No other complaints at this time. 89 y/o female with PMHx of glaucoma, OA, benign essential tremor, COPD, HTN, dislocated intraocular lens presents to the ED BIBA c/o SOB, and cough. Pt states that she suddenly began coughing 3 days PTA, and has been worsening. EMS gave pt 1 nebulizer treatment PTA to ED. Denies abd pain, or cardiac surgery or hx. Never used a BiPAP mask before. No other complaints at this time.

## 2019-12-24 NOTE — CONSULT NOTE ADULT - ASSESSMENT
- copd with symptoms of exacerbation ppd by the RSV infection and peribronchial thickening with the difficulty ot exclude superimposed pneumonia   - improving left lower infiltrates could be related with the associated atelectasis from the previous pneumonia   - severe copd with the combination of asthma and emphysema uses advair and spiriva as an out patient   - elevated didimer assay is normal when adjusted for age with the less clinical suspicion for the pE .  - hypoxia secccondary to exacerbation of copd     PLAN     - suggested continue with the steroids and neb and iv antibiotics with the advanced copd with the possible superimposed infection   - incentive spirometry   - use neb and discontinue spiriva during the hospital stay and symptomatic relief of the cough   - replace electrolytes and patient condition was discussed with the daughter

## 2019-12-24 NOTE — ED ADULT NURSE REASSESSMENT NOTE - NS ED NURSE REASSESS COMMENT FT1
Report received from previous RN, 2nd ERIC barbosa.  Patient RSV B positive.  Patient alert and oriented not in respiratory distress at this time.  Awaiting bed.

## 2019-12-24 NOTE — H&P ADULT - HISTORY OF PRESENT ILLNESS
Pt is a 89 yo female with a pmh/o PVD with chronic skin changes, COPD/Emphysema on home O2 only PRN, HTN, hyperglycemia on steroids, CKD, who present to ED for worsening shortness of breath associated with productive cough of yellow green sputum which she describes as thick, runny nose, body aches. Pt states her daughter had been taking care of her at home and had been suffering from a cold. Pt states daughter left Saturday and that night is when pt sx began. Pt states she continued to become gradually more SOB and needed to use her oxygen last night for the first time ever. Pt states she was given home O2 after leaving [facility] for PRN use. Pt also endorses decreased appetite and two days of urinary hesitancy, frequency, and dysuria for which she was using cranberry juice at home to treat as she states she is prone to UTI. Pt denies leg swelling, chest pain, n/v/d, orthopnea, h/o CHF, palpitations, abd pain, back pain, fever, chills, diaphoresis.

## 2019-12-24 NOTE — H&P ADULT - ASSESSMENT
89 yo female with a pmh/o PVD with chronic skin changes, COPD/Emphysema on home O2 only PRN, HTN, hyperglycemia on steroids, CKD, admitted:    COPD exacerbation, Acute  Admit to any bed   Pulse ox q8 hrs  Nebs q 6 hrs  EKG reviewed, unchanged from prior  troponin wnl  RVP ordered  solumedrol 125 IV then 40 q 6 with plan for taper  NC prn SOB for goal >92%  cbc, cmp, mg, phos, coags for AM  fall/aspiration precaution  Pulmonary consult  DVT prophylaxis: heparin sq     UTI, bacterial, acute  -rocephin daily  -blood and urine culture  -tylenol prn fever, pain    Hypokalemia  -repleted, f/u rpt in AM    Hyperglycemia  -despite anorexia of two days duration, pt p/w hyperglycemia  -known h/o hyperglycemia with steroid use, no recent steroids  -Accucheck now, also before meals and at bedtime  -consider carb restrtiction and if continues to be elevated, consider HISS  -f/u HbA1c as outpatient     Elevated d dimer  -when age adjusted wnl  -WELLS criteria: 0     Elevated BnP, most likely due to valvular disease in elderly female  -EKG unchanged  -TTE ordered  -no orthopnea, leg swelling, chf hx, rales on exam  -strict i/o's  -f/u CT chest    CKD III  -elevated BUN with 1.25cr, as per HIE at baseline  -avoid nephrotoxic medications, cont HCTZ as baseline  -strict i/o's    HTN  -cont propanolol LA 60 mg qd, d/w pharmacy  -cont HCTZ  -pt not on ASA due to bleeding

## 2019-12-25 DIAGNOSIS — J44.1 CHRONIC OBSTRUCTIVE PULMONARY DISEASE WITH (ACUTE) EXACERBATION: ICD-10-CM

## 2019-12-25 LAB
ALBUMIN SERPL ELPH-MCNC: 2.7 G/DL — LOW (ref 3.3–5)
ALP SERPL-CCNC: 47 U/L — SIGNIFICANT CHANGE UP (ref 40–120)
ALT FLD-CCNC: 11 U/L — LOW (ref 12–78)
ANION GAP SERPL CALC-SCNC: 8 MMOL/L — SIGNIFICANT CHANGE UP (ref 5–17)
AST SERPL-CCNC: 24 U/L — SIGNIFICANT CHANGE UP (ref 15–37)
BASOPHILS # BLD AUTO: 0.02 K/UL — SIGNIFICANT CHANGE UP (ref 0–0.2)
BASOPHILS NFR BLD AUTO: 0.2 % — SIGNIFICANT CHANGE UP (ref 0–2)
BILIRUB SERPL-MCNC: 0.3 MG/DL — SIGNIFICANT CHANGE UP (ref 0.2–1.2)
BUN SERPL-MCNC: 34 MG/DL — HIGH (ref 7–23)
CALCIUM SERPL-MCNC: 8.2 MG/DL — LOW (ref 8.5–10.1)
CHLORIDE SERPL-SCNC: 105 MMOL/L — SIGNIFICANT CHANGE UP (ref 96–108)
CO2 SERPL-SCNC: 27 MMOL/L — SIGNIFICANT CHANGE UP (ref 22–31)
CREAT SERPL-MCNC: 0.97 MG/DL — SIGNIFICANT CHANGE UP (ref 0.5–1.3)
EOSINOPHIL # BLD AUTO: 0 K/UL — SIGNIFICANT CHANGE UP (ref 0–0.5)
EOSINOPHIL NFR BLD AUTO: 0 % — SIGNIFICANT CHANGE UP (ref 0–6)
GLUCOSE SERPL-MCNC: 140 MG/DL — HIGH (ref 70–99)
HBA1C BLD-MCNC: 6 % — HIGH (ref 4–5.6)
HCT VFR BLD CALC: 36.6 % — SIGNIFICANT CHANGE UP (ref 34.5–45)
HGB BLD-MCNC: 12 G/DL — SIGNIFICANT CHANGE UP (ref 11.5–15.5)
IMM GRANULOCYTES NFR BLD AUTO: 0.5 % — SIGNIFICANT CHANGE UP (ref 0–1.5)
LYMPHOCYTES # BLD AUTO: 1.56 K/UL — SIGNIFICANT CHANGE UP (ref 1–3.3)
LYMPHOCYTES # BLD AUTO: 16.2 % — SIGNIFICANT CHANGE UP (ref 13–44)
MCHC RBC-ENTMCNC: 28.9 PG — SIGNIFICANT CHANGE UP (ref 27–34)
MCHC RBC-ENTMCNC: 32.8 GM/DL — SIGNIFICANT CHANGE UP (ref 32–36)
MCV RBC AUTO: 88.2 FL — SIGNIFICANT CHANGE UP (ref 80–100)
MONOCYTES # BLD AUTO: 0.19 K/UL — SIGNIFICANT CHANGE UP (ref 0–0.9)
MONOCYTES NFR BLD AUTO: 2 % — SIGNIFICANT CHANGE UP (ref 2–14)
NEUTROPHILS # BLD AUTO: 7.82 K/UL — HIGH (ref 1.8–7.4)
NEUTROPHILS NFR BLD AUTO: 81.1 % — HIGH (ref 43–77)
PLATELET # BLD AUTO: 156 K/UL — SIGNIFICANT CHANGE UP (ref 150–400)
POTASSIUM SERPL-MCNC: 3.6 MMOL/L — SIGNIFICANT CHANGE UP (ref 3.5–5.3)
POTASSIUM SERPL-SCNC: 3.6 MMOL/L — SIGNIFICANT CHANGE UP (ref 3.5–5.3)
PROT SERPL-MCNC: 6.4 GM/DL — SIGNIFICANT CHANGE UP (ref 6–8.3)
RBC # BLD: 4.15 M/UL — SIGNIFICANT CHANGE UP (ref 3.8–5.2)
RBC # FLD: 14.7 % — HIGH (ref 10.3–14.5)
SODIUM SERPL-SCNC: 140 MMOL/L — SIGNIFICANT CHANGE UP (ref 135–145)
WBC # BLD: 9.64 K/UL — SIGNIFICANT CHANGE UP (ref 3.8–10.5)
WBC # FLD AUTO: 9.64 K/UL — SIGNIFICANT CHANGE UP (ref 3.8–10.5)

## 2019-12-25 RX ADMIN — HEPARIN SODIUM 5000 UNIT(S): 5000 INJECTION INTRAVENOUS; SUBCUTANEOUS at 21:17

## 2019-12-25 RX ADMIN — Medication 40 MILLIGRAM(S): at 17:27

## 2019-12-25 RX ADMIN — Medication 1 TABLET(S): at 10:21

## 2019-12-25 RX ADMIN — Medication 3 MILLILITER(S): at 01:45

## 2019-12-25 RX ADMIN — Medication 40 MILLIGRAM(S): at 10:20

## 2019-12-25 RX ADMIN — Medication 40 MILLIGRAM(S): at 01:00

## 2019-12-25 RX ADMIN — Medication 1 TABLET(S): at 10:20

## 2019-12-25 RX ADMIN — Medication 100 MILLIEQUIVALENT(S): at 02:35

## 2019-12-25 RX ADMIN — HEPARIN SODIUM 5000 UNIT(S): 5000 INJECTION INTRAVENOUS; SUBCUTANEOUS at 13:44

## 2019-12-25 RX ADMIN — CEFTRIAXONE 1000 MILLIGRAM(S): 500 INJECTION, POWDER, FOR SOLUTION INTRAMUSCULAR; INTRAVENOUS at 21:17

## 2019-12-25 RX ADMIN — Medication 3 MILLILITER(S): at 20:08

## 2019-12-25 RX ADMIN — Medication 3 MILLILITER(S): at 13:30

## 2019-12-25 RX ADMIN — Medication 12.5 MILLIGRAM(S): at 06:26

## 2019-12-25 RX ADMIN — Medication 3 MILLILITER(S): at 07:38

## 2019-12-25 RX ADMIN — HEPARIN SODIUM 5000 UNIT(S): 5000 INJECTION INTRAVENOUS; SUBCUTANEOUS at 06:27

## 2019-12-25 RX ADMIN — Medication 40 MILLIGRAM(S): at 06:26

## 2019-12-25 RX ADMIN — Medication 2000 UNIT(S): at 10:21

## 2019-12-25 RX ADMIN — Medication 60 MILLIGRAM(S): at 06:26

## 2019-12-25 RX ADMIN — AZITHROMYCIN 255 MILLIGRAM(S): 500 TABLET, FILM COATED ORAL at 01:00

## 2019-12-25 NOTE — PROGRESS NOTE ADULT - ASSESSMENT
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 bacterial superinfection  - mild hyperglycemia from steroids   - history of hiatal and diaphragmatic hernia   - hypoxia  - lymphadenopathy    PLAN   - Continue methylprednisolone 40 mg IV q6  - continue azithromycin, ceftriaxone   - continue pulmicort BID  - continue duonebs q6  - dvt ppx with heparin  - lymphadenopathy may be secondary to rsv, will need outpatient follow up  - does not need spiriva if she is taking duonebs q6  - cough suppressant medication - COPD exacerbation from rsv infection, ? bacterial superinfection  - mild hyperglycemia from steroids   - history of hiatal and diaphragmatic hernia   - hypoxia  - lymphadenopathy    PLAN   - Continue methylprednisolone 40 mg IV q6  - continue azithromycin, ceftriaxone   - continue pulmicort BID  - continue duonebs q6  - dvt ppx with heparin  - lymphadenopathy may be secondary to rsv, will need outpatient follow up  - does not need spiriva if she is taking duonebs q6  - cough suppressant medication, add tessalon or guiafenesin

## 2019-12-25 NOTE — PROGRESS NOTE ADULT - SUBJECTIVE AND OBJECTIVE BOX
Subjective:    Review of Systems:  All 10 systems reviewed in detailed and found to be negative with the exception of what has already been described above    Allergies:  codeine (Nausea)  sulfa drugs (Unknown)    Meds  MEDICATIONS  (STANDING):  albuterol/ipratropium for Nebulization 3 milliLiter(s) Nebulizer every 6 hours  azithromycin  IVPB      azithromycin  IVPB 250 milliGRAM(s) IV Intermittent every 24 hours  cefTRIAXone Injectable. 1000 milliGRAM(s) IV Push every 24 hours  cholecalciferol 2000 Unit(s) Oral daily  heparin  Injectable 5000 Unit(s) SubCutaneous every 8 hours  hydrochlorothiazide 12.5 milliGRAM(s) Oral daily  lactobacillus acidophilus 1 Tablet(s) Oral daily  methylPREDNISolone sodium succinate Injectable 40 milliGRAM(s) IV Push every 6 hours  multivitamin/minerals 1 Tablet(s) Oral daily  propranolol LA 60 milliGRAM(s) Oral daily  tiotropium 18 MICROgram(s) Capsule 1 Capsule(s) Inhalation daily    MEDICATIONS  (PRN):  acetaminophen   Tablet .. 650 milliGRAM(s) Oral every 6 hours PRN Temp greater or equal to 38C (100.4F), Mild Pain (1 - 3)    Physical Exam  T(C): 36.4 (19 @ 05:22), Max: 36.7 (19 @ 19:05)  HR: 68 (19 @ 07:42) (66 - 82)  BP: 138/60 (19 @ 05:22) (107/68 - 143/67)  RR: 18 (19 @ 05:22) (16 - 18)  SpO2: 97% (19 @ 05:22) (97% - 100%)  Gen: Alert, oriented, no distress  HEENT: Anicteric sclera, moist mucous membranes, no JVD, no lymphadenopathy, good dentition  Cardio: Regular rhythm and rate, normal S1S2, no murmurs  Resp: Clear to auscultation bilaterally, no wheezing or rhonchi  GI: Nontender, nondistended, normoactive bowel sounds  Ext: No cyanosis, clubbing or edema  Neuro: Nonfocal    Labs:                        12.0   9.64  )-----------( 156      ( 25 Dec 2019 08:21 )             36.6         140  |  105  |  34<H>  ----------------------------<  140<H>  3.6   |  27  |  0.97    Ca    8.2<L>      25 Dec 2019 08:21  Mg     1.8     12-24    TPro  6.4  /  Alb  2.7<L>  /  TBili  0.3  /  DBili  x   /  AST  24  /  ALT  11<L>  /  AlkPhos  47  12-25    PT/INR - ( 24 Dec 2019 15:47 )   PT: 12.1 sec;   INR: 1.09 ratio         PTT - ( 24 Dec 2019 15:47 )  PTT:27.9 sec  Urinalysis Basic - ( 24 Dec 2019 15:47 )    Color: Yellow / Appearance: Clear / S.020 / pH: x  Gluc: x / Ketone: Negative  / Bili: Negative / Urobili: Negative mg/dL   Blood: x / Protein: 100 mg/dL / Nitrite: Negative   Leuk Esterase: Moderate / RBC: 6-10 /HPF / WBC >50   Sq Epi: x / Non Sq Epi: x / Bacteria: x    < from: CT Chest No Cont (.19 @ 21:53) >  FINDINGS:    LUNGS AND AIRWAYS: Bronchial wall thickening with areas of mucous plugging in both lower lobes.  Emphysematous changes. 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 Opacity again seen at the lateral aspect of the right lower lobe (series 2 image 77) which was present on 2019 and 2019, not significantly changed in size since 2019 (some areas of consolidation are increased in density). Opacities in the left lower lobe have improved since 2019. Calcified granulomas and scattered noncalcified pulmonary nodules are unchanged since 2019. Opacity/nodules at the left lung apex are unchanged since 2019 (series 2 image21).    PLEURA: No pleural effusion.    MEDIASTINUM AND ELIO: Enlarged lymph nodes including a 1.4 x 1.2 cm precarinal lymph node, previously normal in size measuring 1.0 x 0.7 cm (series 2 image 44).    VESSELS: Thoracic aorta normal in caliber withmild to moderate calcified plaque. Coronary artery calcifications.    HEART: Heart size is normal. No pericardial effusion.    CHEST WALL AND LOWER NECK: No enlarged axillary lymph nodes.    VISUALIZED UPPER ABDOMEN: Extensive vascular calcificationsare noted. Tortuous abdominal aorta.    BONES: No aggressive osseous lesion.    IMPRESSION:     Right lower lobe opacity, not significantly changed in size since 2019 with some areas increased in density, differential includes atelectasis or pneumonia.    Interval improvement in left lower lobe opacities since 2019.    Bilateral bronchial wall thickening with areas of mucous plugging in both lower lobes; differential includes reactive airways disease or bronchitis.    Mildly enlarged mediastinal lymph nodes, new since 2019.    Continued follow-up is recommended. Subjective:  Reports slow improvement  Continues to wheeze and cough    Review of Systems:  All 10 systems reviewed in detailed and found to be negative with the exception of what has already been described above    Allergies:  codeine (Nausea)  sulfa drugs (Unknown)    Meds  MEDICATIONS  (STANDING):  albuterol/ipratropium for Nebulization 3 milliLiter(s) Nebulizer every 6 hours  azithromycin  IVPB      azithromycin  IVPB 250 milliGRAM(s) IV Intermittent every 24 hours  cefTRIAXone Injectable. 1000 milliGRAM(s) IV Push every 24 hours  cholecalciferol 2000 Unit(s) Oral daily  heparin  Injectable 5000 Unit(s) SubCutaneous every 8 hours  hydrochlorothiazide 12.5 milliGRAM(s) Oral daily  lactobacillus acidophilus 1 Tablet(s) Oral daily  methylPREDNISolone sodium succinate Injectable 40 milliGRAM(s) IV Push every 6 hours  multivitamin/minerals 1 Tablet(s) Oral daily  propranolol LA 60 milliGRAM(s) Oral daily  tiotropium 18 MICROgram(s) Capsule 1 Capsule(s) Inhalation daily    MEDICATIONS  (PRN):  acetaminophen   Tablet .. 650 milliGRAM(s) Oral every 6 hours PRN Temp greater or equal to 38C (100.4F), Mild Pain (1 - 3)    Physical Exam  T(C): 36.4 (19 @ 05:22), Max: 36.7 (19 @ 19:05)  HR: 68 (19 @ 07:42) (66 - 82)  BP: 138/60 (19 @ 05:22) (107/68 - 143/67)  RR: 18 (19 @ 05:22) (16 - 18)  SpO2: 97% (19 @ 05:22) (97% - 100%)  Gen: Alert, oriented, no distress  HEENT: Anicteric sclera, moist mucous membranes, no JVD, no lymphadenopathy, good dentition  Cardio: Regular rhythm and rate, normal S1S2, no murmurs  Resp: Wheezing bilaterally  GI: Nontender, nondistended, normoactive bowel sounds  Ext: Chronic stasis changes  Neuro: Nonfocal    Labs:                        12.0   9.64  )-----------( 156      ( 25 Dec 2019 08:21 )             36.6         140  |  105  |  34<H>  ----------------------------<  140<H>  3.6   |  27  |  0.97    Ca    8.2<L>      25 Dec 2019 08:21  Mg     1.8     12-24    TPro  6.4  /  Alb  2.7<L>  /  TBili  0.3  /  DBili  x   /  AST  24  /  ALT  11<L>  /  AlkPhos  47  12-25    PT/INR - ( 24 Dec 2019 15:47 )   PT: 12.1 sec;   INR: 1.09 ratio         PTT - ( 24 Dec 2019 15:47 )  PTT:27.9 sec  Urinalysis Basic - ( 24 Dec 2019 15:47 )    Color: Yellow / Appearance: Clear / S.020 / pH: x  Gluc: x / Ketone: Negative  / Bili: Negative / Urobili: Negative mg/dL   Blood: x / Protein: 100 mg/dL / Nitrite: Negative   Leuk Esterase: Moderate / RBC: 6-10 /HPF / WBC >50   Sq Epi: x / Non Sq Epi: x / Bacteria: x    < from: CT Chest No Cont (.19 @ 21:53) >  FINDINGS:    LUNGS AND AIRWAYS: Bronchial wall thickening with areas of mucous plugging in both lower lobes.  Emphysematous changes. 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 Opacity again seen at the lateral aspect of the right lower lobe (series 2 image 77) which was present on 2019 and 2019, not significantly changed in size since 2019 (some areas of consolidation are increased in density). Opacities in the left lower lobe have improved since 2019. Calcified granulomas and scattered noncalcified pulmonary nodules are unchanged since 2019. Opacity/nodules at the left lung apex are unchanged since 2019 (series 2 image21).    PLEURA: No pleural effusion.    MEDIASTINUM AND ELIO: Enlarged lymph nodes including a 1.4 x 1.2 cm precarinal lymph node, previously normal in size measuring 1.0 x 0.7 cm (series 2 image 44).    VESSELS: Thoracic aorta normal in caliber withmild to moderate calcified plaque. Coronary artery calcifications.    HEART: Heart size is normal. No pericardial effusion.    CHEST WALL AND LOWER NECK: No enlarged axillary lymph nodes.    VISUALIZED UPPER ABDOMEN: Extensive vascular calcificationsare noted. Tortuous abdominal aorta.    BONES: No aggressive osseous lesion.    IMPRESSION:     Right lower lobe opacity, not significantly changed in size since 2019 with some areas increased in density, differential includes atelectasis or pneumonia.    Interval improvement in left lower lobe opacities since 2019.    Bilateral bronchial wall thickening with areas of mucous plugging in both lower lobes; differential includes reactive airways disease or bronchitis.    Mildly enlarged mediastinal lymph nodes, new since 2019.    Continued follow-up is recommended.

## 2019-12-25 NOTE — PROGRESS NOTE ADULT - ASSESSMENT
89F with PVD with chronic skin changes, COPD/Emphysema on home O2 only PRN, HTN, hyperglycemia on steroids, CKD, admitted COPD exacerbation 2/2 RSV URI vs supposed bacterial PNA?    #COPD exacerbation, Acute 2/2 RSV URI vs supposed bacterial PNA?  - Duonebs q 6 hrs  - RVP+   - Continue solumedrol 40 q 6 with plan for taper --> consider 40mg q8 for tomorrow  - NC PRN for  SOB for goal >92%  - f/u cbc, cmp, mg, phos  - fall/aspiration precaution  - Pulmonary recs noted and appreciated  - f/u Strep Pneumo IgG   - DVT prophylaxis: heparin sq     #UTI, bacterial, acute- UA negative, mild occasional dysuria  - Continue Ceftriaxone daily  - f/u blood and urine culture  - Continue Tylenol prn fever, pain    #Hypokalemia - resolved  - s/p repletion  - Trend a.m. BMP, mag/phos    #Hyperglycemia  - Accucheck qAC, qHS  - Preprandial a.m. BGM <180: Will  consider carb restriction  and HISS if continues to be elevated, c  - f/u HbA1c as outpatient     #Elevated d dimer  - when age adjusted wnl  - WELLS criteria: 0     #Elevated BnP, most likely due to valvular disease in elderly female  - EKG unchanged  - f/u TTE   - no orthopnea, leg swelling, CHF hx, rales on exam  - strict i/o's  - CT Chest as above    #CKD III  - elevated BUN with 1.25cr, as per HIE at baseline  - avoid nephrotoxic medications, cont HCTZ as baseline  - strict i/o's    #HTN  - cont propanolol LA 60 mg qd, d/w pharmacy  - cont HCTZ  - pt not on ASA due to bleeding    #DVT ppx  - Heparin SubQ 5000u q8      Seen and Discussed w/Dr. Acuna

## 2019-12-25 NOTE — PROGRESS NOTE ADULT - SUBJECTIVE AND OBJECTIVE BOX
Pt has been seen and examined with FP resident, resident supervised agree with a/p       Patient is a 89y old  Female who presents with a chief complaint of COPD exacerbation (24 Dec 2019 21:03)        HPI:  Pt is a 89 yo female with a pmh/o PVD with chronic skin changes, COPD/Emphysema on home O2 only PRN, HTN, hyperglycemia on steroids, CKD, who present to ED for worsening shortness of breath associated with productive cough of yellow green sputum which she describes as thick, runny nose, body aches.     PHYSICAL EXAM:  Vital Signs Last 24 Hrs  T(C): 36.4 (25 Dec 2019 05:22), Max: 36.7 (24 Dec 2019 19:05)  T(F): 97.5 (25 Dec 2019 05:22), Max: 98.1 (24 Dec 2019 19:05)  HR: 68 (25 Dec 2019 07:42) (66 - 82)  BP: 138/60 (25 Dec 2019 05:22) (107/68 - 143/67)  BP(mean): --  RR: 18 (25 Dec 2019 05:22) (16 - 18)  SpO2: 97% (25 Dec 2019 05:22) (97% - 100%)  general- comfortable   -rs- poor air entry, crackles and wheeze present   -cvs-s1s2 normal   -p/a-soft,bs+  -extremity- no asymmetrical swelling noted   -cns- non focal         A/P    #ct abx, supportive care

## 2019-12-25 NOTE — PROGRESS NOTE ADULT - SUBJECTIVE AND OBJECTIVE BOX
HPI:  Pt is a 89 yo female with a pmh/o PVD with chronic skin changes, COPD/Emphysema on home O2 only PRN, HTN, hyperglycemia on steroids, CKD, who present to ED for worsening shortness of breath associated with productive cough of yellow green sputum which she describes as thick, runny nose, body aches. Pt states her daughter had been taking care of her at home and had been suffering from a cold. Pt states daughter left Saturday and that night is when pt sx began. Pt states she continued to become gradually more SOB and needed to use her oxygen last night for the first time ever. Pt states she was given home O2 after leaving [facility] for PRN use. Pt also endorses decreased appetite and two days of urinary hesitancy, frequency, and dysuria for which she was using cranberry juice at home to treat as she states she is prone to UTI. Pt denies leg swelling, chest pain, n/v/d, orthopnea, h/o CHF, palpitations, abd pain, back pain, fever, chills, diaphoresis. (24 Dec 2019 21:03)      SUBJECTIVE: Pt seen and examined at bedside. Pt states improvement in breathing although not at baseline. Ate breakfast this a.m., Looking forward to lunch.   Pt states mild dysuria; increased urinary frequency in the setting of increased PO hydration. Plan for the day discussed. Understanding assessed via teach back method. All other questions answered.     REVIEW OF SYSTEMS:  CONSTITUTIONAL: No fevers or chills  RESPIRATORY: Occasional cough, +wheezing; No shortness of breath  CARDIOVASCULAR: No chest pain or palpitations  GASTROINTESTINAL: No abdominal or epigastric pain. No nausea, vomiting, or hematemesis; tolerating PO  GENITOURINARY: mild occasional dysuria, increased urinary frequency in the setting of increased PO hydration. No hematuria  NEUROLOGICAL: No numbness or weakness  All other review of systems is negative unless indicated above    Vital Signs Last 24 Hrs  T(C): 36.7 (25 Dec 2019 11:24), Max: 36.7 (24 Dec 2019 19:05)  T(F): 98 (25 Dec 2019 11:24), Max: 98.1 (24 Dec 2019 19:05)  HR: 69 (25 Dec 2019 13:30) (66 - 82)  BP: 153/62 (25 Dec 2019 11:24) (113/53 - 153/62)  RR: 18 (25 Dec 2019 11:24) (16 - 18)  SpO2: 99% (25 Dec 2019 11:24) (97% - 99%)    POCT Blood Glucose.: 143 mg/dL (24 Dec 2019 20:42)    PHYSICAL EXAM:  Constitutional: NAD, awake and alert, well-developed  Neck: Soft and supple  Respiratory: + wheezing in upper lobes BL, decreased breath sounds in middle and lower lobes BL  Cardiovascular: S1 and S2, regular rate and rhythm  Gastrointestinal: Bowel Sounds present, soft, nontender, nondistended, no guarding, no rebound  Extremities: No peripheral edema  Vascular: 2+ peripheral pulses  Neurological: A/O x 3  Skin: No rashes    MEDICATIONS:  MEDICATIONS  (STANDING):  albuterol/ipratropium for Nebulization 3 milliLiter(s) Nebulizer every 6 hours  azithromycin  IVPB      azithromycin  IVPB 250 milliGRAM(s) IV Intermittent every 24 hours  cefTRIAXone Injectable. 1000 milliGRAM(s) IV Push every 24 hours  cholecalciferol 2000 Unit(s) Oral daily  heparin  Injectable 5000 Unit(s) SubCutaneous every 8 hours  hydrochlorothiazide 12.5 milliGRAM(s) Oral daily  lactobacillus acidophilus 1 Tablet(s) Oral daily  methylPREDNISolone sodium succinate Injectable 40 milliGRAM(s) IV Push every 6 hours  multivitamin/minerals 1 Tablet(s) Oral daily  propranolol LA 60 milliGRAM(s) Oral daily  tiotropium 18 MICROgram(s) Capsule 1 Capsule(s) Inhalation daily      LABS: All Labs Reviewed:                        12.0   9.64  )-----------( 156      ( 25 Dec 2019 08:21 )             36.6     12-25    140  |  105  |  34<H>  ----------------------------<  140<H>  3.6   |  27  |  0.97    Ca    8.2<L>      25 Dec 2019 08:21  Mg     1.8     12-24    TPro  6.4  /  Alb  2.7<L>  /  TBili  0.3  /  DBili  x   /  AST  24  /  ALT  11<L>  /  AlkPhos  47  12-25    PT/INR - ( 24 Dec 2019 15:47 )   PT: 12.1 sec;   INR: 1.09 ratio         PTT - ( 24 Dec 2019 15:47 )  PTT:27.9 sec  CARDIAC MARKERS ( 24 Dec 2019 15:47 )  <0.015 ng/mL / x     / x     / x     / x        Blood Culture:   RADIOLOGY/EKG:  < from: CT Chest No Cont (12.24.19 @ 21:53) >  EXAM:  CT CHEST                        PROCEDURE DATE:  12/24/2019    INTERPRETATION:  CLINICAL INFORMATION: COPD. Cough. Shortness of breath. Evaluate for pneumonia.  COMPARISON: CT chest 9/4/2019  PROCEDURE:   CT of the Chest was performed without intravenous contrast.  Sagittal and coronal reformats were performed.  FINDINGS:  LUNGS AND AIRWAYS: Bronchial wall thickening with areas of mucous plugging in both lower lobes.  Emphysematous changes. 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 Opacity again seen at the lateral aspect of the right lower lobe (series 2 image 77) which was present on 9/4/2019 and 5/26/2019, not significantly changed in size since 9/4/2019 (some areas of consolidation are increased in density). Opacities in the left lower lobe have improved since 9/4/2019. Calcified granulomas and scattered noncalcified pulmonary nodules are unchanged since 9/4/2019. Opacity/nodules at the left lung apex are unchanged since 5/26/2019 (series 2 image21).  PLEURA: No pleural effusion.  MEDIASTINUM AND ELIO: Enlarged lymph nodes including a 1.4 x 1.2 cm precarinal lymph node, previously normal in size measuring 1.0 x 0.7 cm (series 2 image 44).  VESSELS: Thoracic aorta normal in caliber withmild to moderate calcified plaque. Coronary artery calcifications.  HEART: Heart size is normal. No pericardial effusion.  CHEST WALL AND LOWER NECK: No enlarged axillary lymph nodes.  VISUALIZED UPPER ABDOMEN: Extensive vascular calcificationsare noted. Tortuous abdominal aorta.  BONES: No aggressive osseous lesion.  IMPRESSION: Right lower lobe opacity, not significantly changed in size since 9/4/2019 with some areas increased in density, differential includes atelectasis or pneumonia.  Interval improvement in left lower lobe opacities since 9/4/2019.   Bilateral bronchial wall thickening with areas of mucous plugging in both lower lobes; differential includes reactive airways disease or bronchitis.  Mildly enlarged mediastinal lymph nodes, new since 9/4/2019.  Continued follow-up is recommended.  JEREMÍAS ZUNIGA   This document has been electronically signed. Dec 24 2019 10:34PM   < end of copied text >

## 2019-12-26 LAB
ANION GAP SERPL CALC-SCNC: 10 MMOL/L — SIGNIFICANT CHANGE UP (ref 5–17)
BUN SERPL-MCNC: 45 MG/DL — HIGH (ref 7–23)
CALCIUM SERPL-MCNC: 8.3 MG/DL — LOW (ref 8.5–10.1)
CHLORIDE SERPL-SCNC: 106 MMOL/L — SIGNIFICANT CHANGE UP (ref 96–108)
CO2 SERPL-SCNC: 26 MMOL/L — SIGNIFICANT CHANGE UP (ref 22–31)
CREAT SERPL-MCNC: 1.04 MG/DL — SIGNIFICANT CHANGE UP (ref 0.5–1.3)
GLUCOSE SERPL-MCNC: 149 MG/DL — HIGH (ref 70–99)
HCT VFR BLD CALC: 34 % — LOW (ref 34.5–45)
HGB BLD-MCNC: 11.6 G/DL — SIGNIFICANT CHANGE UP (ref 11.5–15.5)
MAGNESIUM SERPL-MCNC: 1.9 MG/DL — SIGNIFICANT CHANGE UP (ref 1.6–2.6)
MCHC RBC-ENTMCNC: 29.7 PG — SIGNIFICANT CHANGE UP (ref 27–34)
MCHC RBC-ENTMCNC: 34.1 GM/DL — SIGNIFICANT CHANGE UP (ref 32–36)
MCV RBC AUTO: 87 FL — SIGNIFICANT CHANGE UP (ref 80–100)
PHOSPHATE SERPL-MCNC: 3.9 MG/DL — SIGNIFICANT CHANGE UP (ref 2.5–4.5)
PLATELET # BLD AUTO: 185 K/UL — SIGNIFICANT CHANGE UP (ref 150–400)
POTASSIUM SERPL-MCNC: 3.4 MMOL/L — LOW (ref 3.5–5.3)
POTASSIUM SERPL-SCNC: 3.4 MMOL/L — LOW (ref 3.5–5.3)
RBC # BLD: 3.91 M/UL — SIGNIFICANT CHANGE UP (ref 3.8–5.2)
RBC # FLD: 15 % — HIGH (ref 10.3–14.5)
SODIUM SERPL-SCNC: 142 MMOL/L — SIGNIFICANT CHANGE UP (ref 135–145)
WBC # BLD: 12.33 K/UL — HIGH (ref 3.8–10.5)
WBC # FLD AUTO: 12.33 K/UL — HIGH (ref 3.8–10.5)

## 2019-12-26 RX ORDER — MAGNESIUM SULFATE 500 MG/ML
2 VIAL (ML) INJECTION ONCE
Refills: 0 | Status: COMPLETED | OUTPATIENT
Start: 2019-12-26 | End: 2019-12-26

## 2019-12-26 RX ORDER — POTASSIUM CHLORIDE 20 MEQ
40 PACKET (EA) ORAL ONCE
Refills: 0 | Status: COMPLETED | OUTPATIENT
Start: 2019-12-26 | End: 2019-12-26

## 2019-12-26 RX ORDER — HYDRALAZINE HCL 50 MG
12.5 TABLET ORAL ONCE
Refills: 0 | Status: COMPLETED | OUTPATIENT
Start: 2019-12-26 | End: 2019-12-26

## 2019-12-26 RX ORDER — ACETYLCYSTEINE 200 MG/ML
3 VIAL (ML) MISCELLANEOUS THREE TIMES A DAY
Refills: 0 | Status: DISCONTINUED | OUTPATIENT
Start: 2019-12-26 | End: 2019-12-29

## 2019-12-26 RX ADMIN — Medication 2000 UNIT(S): at 11:43

## 2019-12-26 RX ADMIN — Medication 3 MILLILITER(S): at 14:40

## 2019-12-26 RX ADMIN — Medication 1 TABLET(S): at 11:43

## 2019-12-26 RX ADMIN — TIOTROPIUM BROMIDE 1 CAPSULE(S): 18 CAPSULE ORAL; RESPIRATORY (INHALATION) at 08:33

## 2019-12-26 RX ADMIN — HEPARIN SODIUM 5000 UNIT(S): 5000 INJECTION INTRAVENOUS; SUBCUTANEOUS at 06:09

## 2019-12-26 RX ADMIN — Medication 40 MILLIGRAM(S): at 11:43

## 2019-12-26 RX ADMIN — Medication 3 MILLILITER(S): at 08:32

## 2019-12-26 RX ADMIN — CEFTRIAXONE 1000 MILLIGRAM(S): 500 INJECTION, POWDER, FOR SOLUTION INTRAMUSCULAR; INTRAVENOUS at 18:01

## 2019-12-26 RX ADMIN — AZITHROMYCIN 252.5 MILLIGRAM(S): 500 TABLET, FILM COATED ORAL at 18:01

## 2019-12-26 RX ADMIN — Medication 50 GRAM(S): at 20:19

## 2019-12-26 RX ADMIN — AZITHROMYCIN 252.5 MILLIGRAM(S): 500 TABLET, FILM COATED ORAL at 06:09

## 2019-12-26 RX ADMIN — Medication 40 MILLIGRAM(S): at 23:03

## 2019-12-26 RX ADMIN — Medication 3 MILLILITER(S): at 22:19

## 2019-12-26 RX ADMIN — Medication 40 MILLIGRAM(S): at 17:59

## 2019-12-26 RX ADMIN — Medication 40 MILLIEQUIVALENT(S): at 17:58

## 2019-12-26 RX ADMIN — Medication 40 MILLIGRAM(S): at 06:09

## 2019-12-26 RX ADMIN — HEPARIN SODIUM 5000 UNIT(S): 5000 INJECTION INTRAVENOUS; SUBCUTANEOUS at 11:43

## 2019-12-26 RX ADMIN — Medication 60 MILLIGRAM(S): at 06:10

## 2019-12-26 RX ADMIN — Medication 12.5 MILLIGRAM(S): at 06:10

## 2019-12-26 NOTE — PROGRESS NOTE ADULT - SUBJECTIVE AND OBJECTIVE BOX
HPI: Pt is a 89 yo female who presented to ED for worsening shortness of breath associated with productive cough of yellow green sputum which she describes as thick, runny nose, body aches. Pt states her daughter had been taking care of her at home and had been suffering from a cold. Symptoms began 4 days prior to admission. Pt states she continued to become gradually more SOB and needed to use her oxygen last night for the first time ever. Pt states she was given home O2 after leaving [facility] for PRN use. Pt also endorses decreased appetite and two days of urinary hesitancy, frequency, and dysuria for which she was using cranberry juice at home to treat as she states she is prone to UTI.     SUBJECTIVE: Pt seen and examined at bedside, in no apparent distress, c/o SOB.     REVIEW OF SYSTEMS:    CONSTITUTIONAL: No weakness, fevers or chills  EYES/ENT: No visual changes;  No vertigo or throat pain   NECK: No pain or stiffness  RESPIRATORY: No cough, wheezing, hemoptysis; + shortness of breath  CARDIOVASCULAR: No chest pain or palpitations  GASTROINTESTINAL: No abdominal or epigastric pain. No nausea, vomiting, or hematemesis; No diarrhea or constipation. No melena or hematochezia.  GENITOURINARY: No dysuria, frequency or hematuria  NEUROLOGICAL: No numbness or weakness  SKIN: No itching, rashes    Vital Signs Last 24 Hrs  T(C): 36.2 (26 Dec 2019 11:18), Max: 36.3 (25 Dec 2019 19:04)  T(F): 97.2 (26 Dec 2019 11:18), Max: 97.4 (26 Dec 2019 05:41)  HR: 70 (26 Dec 2019 11:18) (62 - 74)  BP: 157/60 (26 Dec 2019 11:18) (149/58 - 157/60)  RR: 18 (26 Dec 2019 11:18) (17 - 18)  SpO2: 95% (26 Dec 2019 11:18) (95% - 100%)  POCT Blood Glucose.: 143 mg/dL (24 Dec 2019 20:42)    PHYSICAL EXAM:  GENERAL: NAD, lying in bed comfortably  HEAD:  Atraumatic, Normocephalic  EYES: EOMI, PERRLA, conjunctiva and sclera clear  ENT: Moist mucous membranes  NECK: Supple, No JVD  CHEST/LUNG: Diminished breath sounds b/l, bilateral diffuse wheezing, crackles to b/l lung base  HEART: Regular rate and rhythm; No murmurs, rubs, or gallops  ABDOMEN: Bowel sounds present; Soft, Nontender, Nondistended. No hepatomegally  EXTREMITIES:  2+ Peripheral Pulses, brisk capillary refill. No clubbing, cyanosis, or edema  NERVOUS SYSTEM:  Alert & Oriented X3, speech clear. No deficits   MSK: FROM all 4 extremities, full and equal strength  SKIN: No rashes or lesions      MEDICATIONS  (STANDING):  acetylcysteine 10%  Inhalation 3 milliLiter(s) Inhalation three times a day  albuterol/ipratropium for Nebulization 3 milliLiter(s) Nebulizer every 6 hours  azithromycin  IVPB      azithromycin  IVPB 250 milliGRAM(s) IV Intermittent every 24 hours  cefTRIAXone Injectable. 1000 milliGRAM(s) IV Push every 24 hours  cholecalciferol 2000 Unit(s) Oral daily  heparin  Injectable 5000 Unit(s) SubCutaneous every 8 hours  hydrochlorothiazide 12.5 milliGRAM(s) Oral daily  lactobacillus acidophilus 1 Tablet(s) Oral daily  methylPREDNISolone sodium succinate Injectable 40 milliGRAM(s) IV Push every 6 hours  multivitamin/minerals 1 Tablet(s) Oral daily  propranolol LA 60 milliGRAM(s) Oral daily    MEDICATIONS  (PRN):  acetaminophen   Tablet .. 650 milliGRAM(s) Oral every 6 hours PRN Temp greater or equal to 38C (100.4F), Mild Pain (1 - 3)        LABS: All Labs Reviewed:                                   11.6   12.33 )-----------( 185      ( 26 Dec 2019 08:38 )             34.0   12-26    142  |  106  |  45<H>  ----------------------------<  149<H>  3.4<L>   |  26  |  1.04    Ca    8.3<L>      26 Dec 2019 08:38  Phos  3.9     12-26  Mg     1.9     12-26    TPro  6.4  /  Alb  2.7<L>  /  TBili  0.3  /  DBili  x   /  AST  24  /  ALT  11<L>  /  AlkPhos  47  12-25      PT/INR - ( 24 Dec 2019 15:47 )   PT: 12.1 sec;   INR: 1.09 ratio         PTT - ( 24 Dec 2019 15:47 )  PTT:27.9 sec  CARDIAC MARKERS ( 24 Dec 2019 15:47 )  <0.015 ng/mL / x     / x     / x     / x        Blood Culture:   RADIOLOGY/EKG:  < from: CT Chest No Cont (12.24.19 @ 21:53) >  EXAM:  CT CHEST                        PROCEDURE DATE:  12/24/2019    INTERPRETATION:  CLINICAL INFORMATION: COPD. Cough. Shortness of breath. Evaluate for pneumonia.  COMPARISON: CT chest 9/4/2019  PROCEDURE:   CT of the Chest was performed without intravenous contrast.  Sagittal and coronal reformats were performed.  FINDINGS:  LUNGS AND AIRWAYS: Bronchial wall thickening with areas of mucous plugging in both lower lobes.  Emphysematous changes. 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 Opacity again seen at the lateral aspect of the right lower lobe (series 2 image 77) which was present on 9/4/2019 and 5/26/2019, not significantly changed in size since 9/4/2019 (some areas of consolidation are increased in density). Opacities in the left lower lobe have improved since 9/4/2019. Calcified granulomas and scattered noncalcified pulmonary nodules are unchanged since 9/4/2019. Opacity/nodules at the left lung apex are unchanged since 5/26/2019 (series 2 image21).  PLEURA: No pleural effusion.  MEDIASTINUM AND ELIO: Enlarged lymph nodes including a 1.4 x 1.2 cm precarinal lymph node, previously normal in size measuring 1.0 x 0.7 cm (series 2 image 44).  VESSELS: Thoracic aorta normal in caliber withmild to moderate calcified plaque. Coronary artery calcifications.  HEART: Heart size is normal. No pericardial effusion.  CHEST WALL AND LOWER NECK: No enlarged axillary lymph nodes.  VISUALIZED UPPER ABDOMEN: Extensive vascular calcificationsare noted. Tortuous abdominal aorta.  BONES: No aggressive osseous lesion.  IMPRESSION: Right lower lobe opacity, not significantly changed in size since 9/4/2019 with some areas increased in density, differential includes atelectasis or pneumonia.  Interval improvement in left lower lobe opacities since 9/4/2019.   Bilateral bronchial wall thickening with areas of mucous plugging in both lower lobes; differential includes reactive airways disease or bronchitis.  Mildly enlarged mediastinal lymph nodes, new since 9/4/2019.  Continued follow-up is recommended.

## 2019-12-26 NOTE — PROGRESS NOTE ADULT - SUBJECTIVE AND OBJECTIVE BOX
Pt has been seen and examined with FP resident, resident supervised agree with a/p       Patient is a 89y old  Female who presents with a chief complaint of COPD exacerbation (24 Dec 2019 21:03)        HPI:  Pt is a 89 yo female with a pmh/o PVD with chronic skin changes, COPD/Emphysema on home O2 only PRN, HTN, hyperglycemia on steroids, CKD, who present to ED for worsening shortness of breath associated with productive cough of yellow green sputum which she describes as thick, runny nose, body aches.     PHYSICAL EXAM:    general- comfortable   -rs- poor air entry, crackles and wheeze present   -cvs-s1s2 normal   -p/a-soft,bs+  -extremity- no asymmetrical swelling noted   -cns- non focal         A/P    #ct abx, supportive care

## 2019-12-26 NOTE — DIETITIAN INITIAL EVALUATION ADULT. - PERTINENT LABORATORY DATA
12-26 Na142 mmol/L Glu 149 mg/dL<H> K+ 3.4 mmol/L<L> Cr  1.04 mg/dL BUN 45 mg/dL<H> Phos 3.9 mg/dL Alb n/a   PAB n/a

## 2019-12-26 NOTE — CHART NOTE - NSCHARTNOTEFT_GEN_A_CORE
Upon Nutritional Assessment by the Registered Dietitian your patient was determined to meet criteria / has evidence of the following diagnosis/diagnoses:          [ ]  Mild Protein Calorie Malnutrition        [ ]  Moderate Protein Calorie Malnutrition        [x ] Severe Protein Calorie Malnutrition        [ ] Unspecified Protein Calorie Malnutrition        [x ] Underweight / BMI <19                            BMI 18.5        [ ] Morbid Obesity / BMI > 40      Findings as based on:  •  Comprehensive nutrition assessment and consultation  •  Calorie counts (nutrient intake analysis)  •  Food acceptance and intake status from observations by staff  •  Follow up  •  Patient education  •  Intervention secondary to interdisciplinary rounds  •   concerns    Pt meets criteria for severe protein-calorie malnutrition in context of chronic disease.    NFPE reveals severe muscle wasting - clavicles, shoulders, scapula, thighs, calves, interosseus),   severe fat wasting (ribs), moderate fat wasting (triceps.)    PO intake < 75% nutritional needs > one month.    Wt loss of 15% in past 7 months, clinically significant.              Treatment:    The following diet has been recommended:  Liberalize diet to regular to maximize caloric and nutrient intake.     Record PO intake in EMR after each meal (nursing.)   Daily weights.    Consider appetite stimulant.    Encourage PO Intake.    Nutritional support may need to be considered.  Monitor PO intake, tolerance, labs and weight.         PROVIDER Section:     By signing this assessment you are acknowledging and agree with the diagnosis/diagnoses assigned by the Registered Dietitian    Comments:

## 2019-12-26 NOTE — DIETITIAN INITIAL EVALUATION ADULT. - ENERGY NEEDS
Ht.    59  "        Wt.    39    kg               BMI            17      IBW       kg               Pt is at  88  %  IBW

## 2019-12-26 NOTE — DIETITIAN INITIAL EVALUATION ADULT. - PERTINENT MEDS FT
MEDICATIONS  (STANDING):  acetylcysteine 10%  Inhalation 3 milliLiter(s) Inhalation three times a day  albuterol/ipratropium for Nebulization 3 milliLiter(s) Nebulizer every 6 hours  azithromycin  IVPB      azithromycin  IVPB 250 milliGRAM(s) IV Intermittent every 24 hours  cefTRIAXone Injectable. 1000 milliGRAM(s) IV Push every 24 hours  cholecalciferol 2000 Unit(s) Oral daily  heparin  Injectable 5000 Unit(s) SubCutaneous every 8 hours  hydrochlorothiazide 12.5 milliGRAM(s) Oral daily  lactobacillus acidophilus 1 Tablet(s) Oral daily  methylPREDNISolone sodium succinate Injectable 40 milliGRAM(s) IV Push every 6 hours  multivitamin/minerals 1 Tablet(s) Oral daily  propranolol LA 60 milliGRAM(s) Oral daily    MEDICATIONS  (PRN):  acetaminophen   Tablet .. 650 milliGRAM(s) Oral every 6 hours PRN Temp greater or equal to 38C (100.4F), Mild Pain (1 - 3)

## 2019-12-26 NOTE — PROGRESS NOTE ADULT - SUBJECTIVE AND OBJECTIVE BOX
SUBJECTIVE     Patient still coughing and wheezing and feels need for the 02   no fever spikes     PAST MEDICAL & SURGICAL HISTORY:  Glaucoma  Osteoarthritis  Benign essential tremor  COPD (chronic obstructive pulmonary disease)  Hypertension  Ankle injury  Dislocated intraocular lens: right eye  Glaucoma filtering bleb of both eyes    OBJECTIVE   Vital Signs Last 24 Hrs  T(C): 36.3 (26 Dec 2019 05:41), Max: 36.7 (25 Dec 2019 11:24)  T(F): 97.4 (26 Dec 2019 05:41), Max: 98 (25 Dec 2019 11:24)  HR: 64 (26 Dec 2019 08:44) (62 - 74)  BP: 154/53 (26 Dec 2019 05:41) (149/58 - 154/53)  BP(mean): --  RR: 17 (26 Dec 2019 05:41) (17 - 18)  SpO2: 100% (26 Dec 2019 08:44) (96% - 100%)    Review of systems   as dictated in the history of present illness with the review of other systems non contributory     PHYSICAL EXAM:  Constitutional: , awake and alert, not in distress and on the nasal canula and able to complete sentences   HEENT: Normo cephalic atraumatic  Neck: Soft and supple, No J.V.D   Respiratory: vesicular breathing has diffuse bilateral wheeze and rhonchi   Cardiovascular: S1 and S2, regular rate .   Gastrointestinal:  soft, nontender,   Extremities: No  edema or calf tenderness .  Neurological: No new  focal deficits.    MEDICATIONS  (STANDING):  albuterol/ipratropium for Nebulization 3 milliLiter(s) Nebulizer every 6 hours  azithromycin  IVPB      azithromycin  IVPB 250 milliGRAM(s) IV Intermittent every 24 hours  cefTRIAXone Injectable. 1000 milliGRAM(s) IV Push every 24 hours  cholecalciferol 2000 Unit(s) Oral daily  heparin  Injectable 5000 Unit(s) SubCutaneous every 8 hours  hydrochlorothiazide 12.5 milliGRAM(s) Oral daily  lactobacillus acidophilus 1 Tablet(s) Oral daily  methylPREDNISolone sodium succinate Injectable 40 milliGRAM(s) IV Push every 6 hours  multivitamin/minerals 1 Tablet(s) Oral daily  propranolol LA 60 milliGRAM(s) Oral daily                            11.6   12.33 )-----------( 185      ( 26 Dec 2019 08:38 )             34.0     12-26    142  |  106  |  45<H>  ----------------------------<  149<H>  3.4<L>   |  26  |  1.04    Ca    8.3<L>      26 Dec 2019 08:38  Phos  3.9     12-26  Mg     1.9     12-26    TPro  6.4  /  Alb  2.7<L>  /  TBili  0.3  /  DBili  x   /  AST  24  /  ALT  11<L>  /  AlkPhos  47  12-25      Culture - Blood (collected 24 Dec 2019 20:12)  Source: .Blood None  Preliminary Report (26 Dec 2019 02:04):    No growth to date.    Culture - Blood (collected 24 Dec 2019 20:12)  Source: .Blood None  Preliminary Report (26 Dec 2019 02:04):    No growth to date.       < from: CT Chest No Cont (12.24.19 @ 21:53) >  UNGS AND AIRWAYS: Bronchial wall thickening with areas of mucous plugging in both lower lobes.  Emphysematous changes. 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 Opacity again seen at the lateral aspect of the right lower lobe (series 2 image 77) which was present on 9/4/2019 and 5/26/2019, not significantly changed in size since 9/4/2019 (some areas of consolidation are increased in density). Opacities in the left lower lobe have improved since 9/4/2019. Calcified granulomas and scattered noncalcified pulmonary nodules are unchanged since 9/4/2019. Opacity/nodules at the left lung apex are unchanged since 5/26/2019 (series 2 image21).    PLEURA: No pleural effusion.    MEDIASTINUM AND ELIO: Enlarged lymph nodes including a 1.4 x 1.2 cm precarinal lymph node, previously normal in size measuring 1.0 x 0.7 cm (series 2 image 44).    VESSELS: Thoracic aorta normal in caliber withmild to moderate calcified plaque. Coronary artery calcifications.    HEART: Heart size is normal. No pericardial effusion.    CHEST WALL AND LOWER NECK: No enlarged axillary lymph nodes.    VISUALIZED UPPER ABDOMEN: Extensive vascular calcificationsare noted. Tortuous abdominal aorta.    BONES: No aggressive osseous lesion.    IMPRESSION:     Right lower lobe opacity, not significantly changed in size since 9/4/2019 with some areas increased in density, differential includes atelectasis or pneumonia.    Interval improvement in left lower lobe opacities since 9/4/2019.    Bilateral bronchial wall thickening with areas of mucous plugging in both lower lobes; differential includes reactive airways disease or bronchitis.    Mildly enlarged mediastinal lymph nodes, new since 9/4/2019.    Continued follow-up is recommended.      < end of copied text >

## 2019-12-26 NOTE — DIETITIAN INITIAL EVALUATION ADULT. - OTHER INFO
Pt is a 89 yo female with a pmh/o PVD with chronic skin changes, COPD/Emphysema on home O2 only PRN, HTN, hyperglycemia on steroids, CKD, who present to ED for worsening shortness of breath associated with productive cough of yellow green sputum which she describes as thick, runny nose, body aches. Pt states her daughter had been taking care of her at home and had been suffering from a cold. Pt states daughter left Saturday and that night is when pt sx began. Pt states she continued to become gradually more SOB and needed to use her oxygen last night for the first time ever.  Pt also endorses decreased appetite and two days of urinary hesitancy, frequency, and dysuria for which she was using cranberry juice at home to treat as she states she is prone to UTI. Pt denies n/v/d. Pt is a 89 yo female with a pmh/o PVD with chronic skin changes, COPD/Emphysema on home O2 only PRN, HTN, hyperglycemia on steroids, CKD, who present to ED for worsening shortness of breath associated with productive cough of yellow green sputum which she describes as thick, runny nose, body aches. Pt states her daughter had been taking care of her at home and had been suffering from a cold. Pt states daughter left Saturday and that night is when pt sx began. Pt states she continued to become gradually more SOB and needed to use her oxygen last night for the first time ever.  Pt also endorses decreased appetite and two days of urinary hesitancy, frequency, and dysuria for which she was using cranberry juice at home to treat as she states she is prone to UTI. Pt denies n/v/d.  Visited with pt at bedside.  Diet recall indicates that pt does not meet needs at  home, and is not interested in any supplements at . Pt is a 89 yo female with a pmh/o PVD with chronic skin changes, COPD/Emphysema on home O2 only PRN, HTN, hyperglycemia on steroids, CKD, who present to ED for worsening shortness of breath associated with productive cough of yellow green sputum which she describes as thick, runny nose, body aches. Pt states her daughter had been taking care of her at home and had been suffering from a cold. Pt states daughter left Saturday and that night is when pt sx began. Pt states she continued to become gradually more SOB and needed to use her oxygen last night for the first time ever.  Pt also endorses decreased appetite and two days of urinary hesitancy, frequency, and dysuria for which she was using cranberry juice at home to treat as she states she is prone to UTI. Pt denies n/v/d.  Visited with pt at bedside.  Diet recall indicates that pt does not meet needs at  home, and is not interested in any supplements at . Pt eats very lightly for breakfast, lightly for lunch (a yogurt), then a protein, starch and vegetable for dinner.   Pt on POCT.

## 2019-12-26 NOTE — PROGRESS NOTE ADULT - ASSESSMENT
- copd with symptoms of exacerbation ppd by the RSV infection and peribronchial thickening with the difficulty ot exclude superimposed pneumonia   - improving left lower infiltrates could be related with the associated atelectasis from the previous pneumonia   - severe copd with the combination of asthma and emphysema uses advair and spiriva as an out patient   - elevated didimer assay is normal when adjusted for age with the less clinical suspicion for the pE .  - hypoxia secccondary to exacerbation of copd     PLAN     - suggested continue with the steroids and neb and iv antibiotics with the advanced copd with the possible superimposed infection   - incentive spirometry   - use neb and discontinue spiriva during the hospital stay and symptomatic relief of the cough   - replace electrolytes   - mild mediastinal adenopathy likely reactive .  - would add mucomyst as the patient feels difficulty with the expectoration

## 2019-12-26 NOTE — DIETITIAN INITIAL EVALUATION ADULT. - MALNUTRITION
Pt meets criteria for severe protein-calorie malnutrition in context of chronic disease.  NFPE reveals severe muscle wasting - clavicles, shoulders, scapula, thighs, calves, interosseus), severe fat wasting (ribs), moderate fat wasting (triceps.)  PO intake < 75% nutritional needs > one month.  Wt loss of 15% in past 7 months, clinically significant.

## 2019-12-26 NOTE — PROGRESS NOTE ADULT - ASSESSMENT
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    COPD exacerbation, Acute, 2/2 RSV URI with supposed bacterial PNA  -Duonebs q 6 hrs  -RVP+  -Continue solumedrol 40 q 6 with plan for taper --> consider 40mg q8 for tomorrow  -Supplemental O2   -Pulmonary consult appreciated: Hold Spiriva, start mucomyst  -f/u Strep Pneumo IgG   -Blood cultures NGTD    Hypokalemia   -K 3.4  -repletion in progress  -Trend a.m. BMP    Hyperglycemia  -Accucheck qAC, qHS  -Preprandial a.m. BGM <180: Will  consider carb restriction  and HISS if continues to be elevated  -f/u HbA1c as outpatient     Elevated d dimer  -age adjusted wnl  -WELLS criteria: 0     Elevated BnP, most likely due to valvular disease in elderly female  -EKG unchanged  -f/u TTE     CKD III  -elevated BUN with 1.25cr, as per HIE at baseline  -avoid nephrotoxic medications, cont HCTZ as baseline  -strict i/o's    HTN  -Continue propanolol LA 60 mg qd  -Continue HCTZ 12.5mg po daily     DVT ppx  -Heparin SubQ 5000u q8      Above Discussed with Dr. Acuna

## 2019-12-26 NOTE — DIETITIAN INITIAL EVALUATION ADULT. - ADD RECOMMEND
Liberalize diet to regular.  Record PO intake in EMR after each meal (nursing.) Daily weights.  Consider appetite stimulant.  Encourage PO Intake.  Nutritional support may need to be considered.

## 2019-12-26 NOTE — DIETITIAN INITIAL EVALUATION ADULT. - PHYSICAL APPEARANCE
other (specify) PU stage 1 left heel.  [id number]  No edema note. other (specify)/PT appears very thin

## 2019-12-27 ENCOUNTER — TRANSCRIPTION ENCOUNTER (OUTPATIENT)
Age: 84
End: 2019-12-27

## 2019-12-27 LAB
ANION GAP SERPL CALC-SCNC: 5 MMOL/L — SIGNIFICANT CHANGE UP (ref 5–17)
BUN SERPL-MCNC: 46 MG/DL — HIGH (ref 7–23)
CALCIUM SERPL-MCNC: 8.7 MG/DL — SIGNIFICANT CHANGE UP (ref 8.5–10.1)
CHLORIDE SERPL-SCNC: 106 MMOL/L — SIGNIFICANT CHANGE UP (ref 96–108)
CO2 SERPL-SCNC: 31 MMOL/L — SIGNIFICANT CHANGE UP (ref 22–31)
CREAT SERPL-MCNC: 1.01 MG/DL — SIGNIFICANT CHANGE UP (ref 0.5–1.3)
GLUCOSE SERPL-MCNC: 170 MG/DL — HIGH (ref 70–99)
HCT VFR BLD CALC: 35.2 % — SIGNIFICANT CHANGE UP (ref 34.5–45)
HGB BLD-MCNC: 11.5 G/DL — SIGNIFICANT CHANGE UP (ref 11.5–15.5)
MAGNESIUM SERPL-MCNC: 2.7 MG/DL — HIGH (ref 1.6–2.6)
MCHC RBC-ENTMCNC: 28.5 PG — SIGNIFICANT CHANGE UP (ref 27–34)
MCHC RBC-ENTMCNC: 32.7 GM/DL — SIGNIFICANT CHANGE UP (ref 32–36)
MCV RBC AUTO: 87.3 FL — SIGNIFICANT CHANGE UP (ref 80–100)
PHOSPHATE SERPL-MCNC: 3.7 MG/DL — SIGNIFICANT CHANGE UP (ref 2.5–4.5)
PLATELET # BLD AUTO: 210 K/UL — SIGNIFICANT CHANGE UP (ref 150–400)
POTASSIUM SERPL-MCNC: 3.5 MMOL/L — SIGNIFICANT CHANGE UP (ref 3.5–5.3)
POTASSIUM SERPL-SCNC: 3.5 MMOL/L — SIGNIFICANT CHANGE UP (ref 3.5–5.3)
RBC # BLD: 4.03 M/UL — SIGNIFICANT CHANGE UP (ref 3.8–5.2)
RBC # FLD: 14.7 % — HIGH (ref 10.3–14.5)
SODIUM SERPL-SCNC: 142 MMOL/L — SIGNIFICANT CHANGE UP (ref 135–145)
WBC # BLD: 8.24 K/UL — SIGNIFICANT CHANGE UP (ref 3.8–10.5)
WBC # FLD AUTO: 8.24 K/UL — SIGNIFICANT CHANGE UP (ref 3.8–10.5)

## 2019-12-27 PROCEDURE — 93306 TTE W/DOPPLER COMPLETE: CPT | Mod: 26

## 2019-12-27 RX ORDER — HYDRALAZINE HCL 50 MG
12.5 TABLET ORAL ONCE
Refills: 0 | Status: COMPLETED | OUTPATIENT
Start: 2019-12-27 | End: 2019-12-27

## 2019-12-27 RX ORDER — LANOLIN ALCOHOL/MO/W.PET/CERES
5 CREAM (GRAM) TOPICAL AT BEDTIME
Refills: 0 | Status: DISCONTINUED | OUTPATIENT
Start: 2019-12-27 | End: 2019-12-29

## 2019-12-27 RX ADMIN — HEPARIN SODIUM 5000 UNIT(S): 5000 INJECTION INTRAVENOUS; SUBCUTANEOUS at 21:57

## 2019-12-27 RX ADMIN — Medication 2000 UNIT(S): at 12:08

## 2019-12-27 RX ADMIN — Medication 3 MILLILITER(S): at 08:01

## 2019-12-27 RX ADMIN — Medication 12.5 MILLIGRAM(S): at 01:38

## 2019-12-27 RX ADMIN — AZITHROMYCIN 252.5 MILLIGRAM(S): 500 TABLET, FILM COATED ORAL at 21:56

## 2019-12-27 RX ADMIN — HEPARIN SODIUM 5000 UNIT(S): 5000 INJECTION INTRAVENOUS; SUBCUTANEOUS at 06:15

## 2019-12-27 RX ADMIN — Medication 40 MILLIGRAM(S): at 12:08

## 2019-12-27 RX ADMIN — Medication 60 MILLIGRAM(S): at 06:15

## 2019-12-27 RX ADMIN — Medication 3 MILLILITER(S): at 13:44

## 2019-12-27 RX ADMIN — Medication 12.5 MILLIGRAM(S): at 03:24

## 2019-12-27 RX ADMIN — Medication 3 MILLILITER(S): at 19:35

## 2019-12-27 RX ADMIN — CEFTRIAXONE 1000 MILLIGRAM(S): 500 INJECTION, POWDER, FOR SOLUTION INTRAMUSCULAR; INTRAVENOUS at 21:56

## 2019-12-27 RX ADMIN — Medication 12.5 MILLIGRAM(S): at 06:15

## 2019-12-27 RX ADMIN — Medication 40 MILLIGRAM(S): at 06:15

## 2019-12-27 NOTE — PROGRESS NOTE ADULT - SUBJECTIVE AND OBJECTIVE BOX
Pt has been seen and examined with FP resident, resident supervised agree with a/p       Patient is a 89y old  Female who presents with a chief complaint of COPD exacerbation (27 Dec 2019 09:11)        HPI:  Pt is a 89 yo female with a pmh/o PVD with chronic skin changes, COPD/Emphysema on home O2 only PRN, HTN, hyperglycemia on steroids, CKD, who present to ED for worsening shortness of breath associated with productive cough of yellow green sputum which she describes as thick, runny nose, body aches.       PHYSICAL EXAM:  Vital Signs Last 24 Hrs  T(C): 36.5 (27 Dec 2019 11:51), Max: 36.7 (27 Dec 2019 04:36)  T(F): 97.7 (27 Dec 2019 11:51), Max: 98 (27 Dec 2019 04:36)  HR: 64 (27 Dec 2019 13:46) (60 - 73)  BP: 102/66 (27 Dec 2019 11:51) (102/66 - 215/84)  BP(mean): --  RR: 20 (27 Dec 2019 11:51) (18 - 20)  SpO2: 100% (27 Dec 2019 11:51) (96% - 100%)  general- comfortable   -rs-aeeb, wheeze noted   -cvs-s1s2 normal   -p/a-soft,bs+  -extremity- no asymmetrical swelling noted   -cns- non focal         A/P    #ct abx, monitor it     #DVT pr

## 2019-12-27 NOTE — PROGRESS NOTE ADULT - ASSESSMENT
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    COPD exacerbation, Acute, 2/2 RSV URI with supposed bacterial PNA  -Duonebs q 6 hrs  -RVP+  -Continue solumedrol 40mg IV q12hrs   -Continue Ceftriaxone 1g IV q24hrs, Azithromycin 500mg daily   -Supplemental O2   -Pulmonary consult appreciated  -Blood cultures NGTD    Hypokalemia   -K 3.4 -> 3.5  -s/p repletion  -Trend a.m. BMP    Hyperglycemia  -Accucheck qAC, qHS  -Preprandial a.m. BGM <180: Will  consider carb restriction  and HISS if continues to be elevated  -f/u HbA1c as outpatient     Elevated d dimer  -age adjusted wnl  -WELLS criteria: 0     Elevated BnP, most likely due to valvular disease in elderly female  -EKG unchanged  -f/u TTE     CKD III  -elevated BUN with 1.25cr, as per HIE at baseline  -avoid nephrotoxic medications, cont HCTZ as baseline  -strict i/o's    HTN  -Continue propanolol LA 60 mg qd  -Continue HCTZ 12.5mg po daily     DVT ppx  -Heparin SubQ 5000u q8      Above Discussed with Dr. Acuna

## 2019-12-27 NOTE — PROGRESS NOTE ADULT - SUBJECTIVE AND OBJECTIVE BOX
HPI: Pt is a 89 yo female who presented to ED for worsening shortness of breath associated with productive cough of yellow green sputum which she describes as thick, runny nose, body aches. Pt states her daughter had been taking care of her at home and had been suffering from a cold. Symptoms began 4 days prior to admission. Pt states she continued to become gradually more SOB and needed to use her oxygen last night for the first time ever. Pt states she was given home O2 after leaving [facility] for PRN use. Pt also endorses decreased appetite and two days of urinary hesitancy, frequency, and dysuria for which she was using cranberry juice at home to treat as she states she is prone to UTI.     SUBJECTIVE: Pt seen and examined at bedside, in no apparent distress, states that she is feeling much better, continues with mild SOB    REVIEW OF SYSTEMS:    CONSTITUTIONAL: No weakness, fevers or chills  EYES/ENT: No visual changes;  No vertigo or throat pain   NECK: No pain or stiffness  RESPIRATORY: No cough, wheezing, hemoptysis; + shortness of breath  CARDIOVASCULAR: No chest pain or palpitations  GASTROINTESTINAL: No abdominal or epigastric pain. No nausea, vomiting, or hematemesis; No diarrhea or constipation. No melena or hematochezia.  GENITOURINARY: No dysuria, frequency or hematuria  NEUROLOGICAL: No numbness or weakness  SKIN: No itching, rashes    Vital Signs Last 24 Hrs  T(C): 36.5 (27 Dec 2019 11:51), Max: 36.7 (27 Dec 2019 04:36)  T(F): 97.7 (27 Dec 2019 11:51), Max: 98 (27 Dec 2019 04:36)  HR: 64 (27 Dec 2019 13:46) (60 - 73)  BP: 102/66 (27 Dec 2019 11:51) (102/66 - 215/84)  RR: 20 (27 Dec 2019 11:51) (18 - 20)  SpO2: 100% (27 Dec 2019 11:51) (96% - 100%)    PHYSICAL EXAM:  GENERAL: NAD, lying in bed comfortably  HEAD:  Atraumatic, Normocephalic  EYES: EOMI, PERRLA, conjunctiva and sclera clear  ENT: Moist mucous membranes  NECK: Supple, No JVD  CHEST/LUNG: Diminished breath sounds b/l, bilateral diffuse expiratory wheezing  HEART: Regular rate and rhythm; No murmurs, rubs, or gallops  ABDOMEN: Bowel sounds present; Soft, Nontender, Nondistended. No hepatomegally  EXTREMITIES:  2+ Peripheral Pulses, brisk capillary refill. No clubbing, cyanosis, or edema  NERVOUS SYSTEM:  Alert & Oriented X3, speech clear. No deficits   MSK: FROM all 4 extremities, full and equal strength  SKIN: No rashes or lesions    MEDICATIONS  (STANDING):  acetylcysteine 10%  Inhalation 3 milliLiter(s) Inhalation three times a day  albuterol/ipratropium for Nebulization 3 milliLiter(s) Nebulizer every 6 hours  azithromycin  IVPB      azithromycin  IVPB 250 milliGRAM(s) IV Intermittent every 24 hours  cefTRIAXone Injectable. 1000 milliGRAM(s) IV Push every 24 hours  cholecalciferol 2000 Unit(s) Oral daily  heparin  Injectable 5000 Unit(s) SubCutaneous every 8 hours  hydrochlorothiazide 12.5 milliGRAM(s) Oral daily  lactobacillus acidophilus 1 Tablet(s) Oral daily  melatonin 5 milliGRAM(s) Oral at bedtime  methylPREDNISolone sodium succinate Injectable 40 milliGRAM(s) IV Push every 12 hours  multivitamin/minerals 1 Tablet(s) Oral daily  propranolol LA 60 milliGRAM(s) Oral daily    MEDICATIONS  (PRN):  acetaminophen   Tablet .. 650 milliGRAM(s) Oral every 6 hours PRN Temp greater or equal to 38C (100.4F), Mild Pain (1 - 3)        LABS: All Labs Reviewed:                        11.5   8.24  )-----------( 210      ( 27 Dec 2019 06:44 )             35.2   12-27    142  |  106  |  46<H>  ----------------------------<  170<H>  3.5   |  31  |  1.01    Ca    8.7      27 Dec 2019 06:44  Phos  3.7     12-27  Mg     2.7     12-27        TPro  6.4  /  Alb  2.7<L>  /  TBili  0.3  /  DBili  x   /  AST  24  /  ALT  11<L>  /  AlkPhos  47  12-25      PT/INR - ( 24 Dec 2019 15:47 )   PT: 12.1 sec;   INR: 1.09 ratio         PTT - ( 24 Dec 2019 15:47 )  PTT:27.9 sec  CARDIAC MARKERS ( 24 Dec 2019 15:47 )  <0.015 ng/mL / x     / x     / x     / x        Blood Culture:   RADIOLOGY/EKG:  < from: CT Chest No Cont (12.24.19 @ 21:53) >  EXAM:  CT CHEST                        PROCEDURE DATE:  12/24/2019    INTERPRETATION:  CLINICAL INFORMATION: COPD. Cough. Shortness of breath. Evaluate for pneumonia.  COMPARISON: CT chest 9/4/2019  PROCEDURE:   CT of the Chest was performed without intravenous contrast.  Sagittal and coronal reformats were performed.  FINDINGS:  LUNGS AND AIRWAYS: Bronchial wall thickening with areas of mucous plugging in both lower lobes.  Emphysematous changes. 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 Opacity again seen at the lateral aspect of the right lower lobe (series 2 image 77) which was present on 9/4/2019 and 5/26/2019, not significantly changed in size since 9/4/2019 (some areas of consolidation are increased in density). Opacities in the left lower lobe have improved since 9/4/2019. Calcified granulomas and scattered noncalcified pulmonary nodules are unchanged since 9/4/2019. Opacity/nodules at the left lung apex are unchanged since 5/26/2019 (series 2 image21).  PLEURA: No pleural effusion.  MEDIASTINUM AND ELIO: Enlarged lymph nodes including a 1.4 x 1.2 cm precarinal lymph node, previously normal in size measuring 1.0 x 0.7 cm (series 2 image 44).  VESSELS: Thoracic aorta normal in caliber withmild to moderate calcified plaque. Coronary artery calcifications.  HEART: Heart size is normal. No pericardial effusion.  CHEST WALL AND LOWER NECK: No enlarged axillary lymph nodes.  VISUALIZED UPPER ABDOMEN: Extensive vascular calcificationsare noted. Tortuous abdominal aorta.  BONES: No aggressive osseous lesion.  IMPRESSION: Right lower lobe opacity, not significantly changed in size since 9/4/2019 with some areas increased in density, differential includes atelectasis or pneumonia.  Interval improvement in left lower lobe opacities since 9/4/2019.   Bilateral bronchial wall thickening with areas of mucous plugging in both lower lobes; differential includes reactive airways disease or bronchitis.  Mildly enlarged mediastinal lymph nodes, new since 9/4/2019.  Continued follow-up is recommended.

## 2019-12-27 NOTE — DISCHARGE NOTE NURSING/CASE MANAGEMENT/SOCIAL WORK - PATIENT PORTAL LINK FT
You can access the FollowMyHealth Patient Portal offered by [facility] by registering at the following website: [email or web address]. By joining [employer]’s FollowMyHealth portal, you will also be able to view your health information using other applications (apps) compatible with our system.

## 2019-12-27 NOTE — PROGRESS NOTE ADULT - ASSESSMENT
- copd with symptoms of exacerbation ppd by the RSV infection and peribronchial thickening with the difficulty ot exclude superimposed pneumonia   - improving left lower infiltrates could be related with the associated atelectasis from the previous pneumonia   - severe copd with the combination of asthma and emphysema uses advair and spiriva as an out patient   - elevated didimer assay is normal when adjusted for age with the less clinical suspicion for the pE .  - hypoxia secccondary to exacerbation of copd     PLAN     -  suggested to taper the Solu-Medrol to twice a day with increasing confusion especially worse at night with advanced age  - incentive spirometry   -  continue with Symbicort and nebulization during the hospital stay and patient usually takes Advair and Spiriva as an outpatient   - mild mediastinal adenopathy likely reactive    -  continue with Mucomyst during the hospital stay and can be  discontinued at the time of the discharge  -  change of IV antibiotics to p.o. I would discharge

## 2019-12-27 NOTE — PROGRESS NOTE ADULT - SUBJECTIVE AND OBJECTIVE BOX
SUBJECTIVE       PAST MEDICAL & SURGICAL HISTORY:  Glaucoma  Osteoarthritis  Benign essential tremor  COPD (chronic obstructive pulmonary disease)  Hypertension  Ankle injury  Dislocated intraocular lens: right eye  Glaucoma filtering bleb of both eyes    OBJECTIVE   Vital Signs Last 24 Hrs  T(C): 36.7 (27 Dec 2019 04:36), Max: 36.7 (27 Dec 2019 04:36)  T(F): 98 (27 Dec 2019 04:36), Max: 98 (27 Dec 2019 04:36)  HR: 62 (27 Dec 2019 08:03) (60 - 73)  BP: 148/60 (27 Dec 2019 06:20) (148/60 - 215/84)  BP(mean): --  RR: 20 (27 Dec 2019 04:36) (18 - 20)  SpO2: 100% (27 Dec 2019 04:36) (95% - 100%)    Review of systems   as dictated in the history of present illness with the review of other systems non contributory     PHYSICAL EXAM:  Constitutional: , awake and alert, not in distress.  HEENT: Normo cephalic atraumatic  Neck: Soft and supple, No J.V.D   Respiratory: vesicular breathing , No wheezing, rales or rhonchi.   Cardiovascular: S1 and S2, regular rate .   Gastrointestinal:  soft, nontender,   Extremities: No  edema or calf tenderness .  Neurological: No new  focal deficits.    MEDICATIONS  (STANDING):  acetylcysteine 10%  Inhalation 3 milliLiter(s) Inhalation three times a day  albuterol/ipratropium for Nebulization 3 milliLiter(s) Nebulizer every 6 hours  azithromycin  IVPB      azithromycin  IVPB 250 milliGRAM(s) IV Intermittent every 24 hours  cefTRIAXone Injectable. 1000 milliGRAM(s) IV Push every 24 hours  cholecalciferol 2000 Unit(s) Oral daily  heparin  Injectable 5000 Unit(s) SubCutaneous every 8 hours  hydrochlorothiazide 12.5 milliGRAM(s) Oral daily  lactobacillus acidophilus 1 Tablet(s) Oral daily  melatonin 5 milliGRAM(s) Oral at bedtime  methylPREDNISolone sodium succinate Injectable 40 milliGRAM(s) IV Push every 6 hours  multivitamin/minerals 1 Tablet(s) Oral daily  propranolol LA 60 milliGRAM(s) Oral daily                            11.5   8.24  )-----------( 210      ( 27 Dec 2019 06:44 )             35.2     12-27    142  |  106  |  46<H>  ----------------------------<  170<H>  3.5   |  31  |  1.01    Ca    8.7      27 Dec 2019 06:44  Phos  3.7     12-27  Mg     2.7     12-27        Culture - Blood (collected 24 Dec 2019 20:12)  Source: .Blood None  Preliminary Report (26 Dec 2019 02:04):    No growth to date.    Culture - Blood (collected 24 Dec 2019 20:12)  Source: .Blood None  Preliminary Report (26 Dec 2019 02:04):    No growth to date. SUBJECTIVE      patient was seen in the a.m. and noted to have worsening of confusion last night  Still some  wheeze and cough and says ambulating with oxygen    PAST MEDICAL & SURGICAL HISTORY:  Glaucoma  Osteoarthritis  Benign essential tremor  COPD (chronic obstructive pulmonary disease)  Hypertension  Ankle injury  Dislocated intraocular lens: right eye  Glaucoma filtering bleb of both eyes    OBJECTIVE   Vital Signs Last 24 Hrs  T(C): 36.7 (27 Dec 2019 04:36), Max: 36.7 (27 Dec 2019 04:36)  T(F): 98 (27 Dec 2019 04:36), Max: 98 (27 Dec 2019 04:36)  HR: 62 (27 Dec 2019 08:03) (60 - 73)  BP: 148/60 (27 Dec 2019 06:20) (148/60 - 215/84)  BP(mean): --  RR: 20 (27 Dec 2019 04:36) (18 - 20)  SpO2: 100% (27 Dec 2019 04:36) (95% - 100%)    Review of systems   as dictated in the history of present illness with the review of other systems non contributory     PHYSICAL EXAM:  Constitutional: , awake and alert, not in distress. and comfortable on the O2 with a nasal cannula the  HEENT: Normo cephalic atraumatic  Neck: Soft and supple, No J.V.D   Respiratory: vesicular breathing  has bilateral wheeze and rhonchi with prolonged expiration  Cardiovascular: S1 and S2, regular rate .   Gastrointestinal:  soft, nontender,   Extremities: No  edema or calf tenderness .  Neurological: No new  focal deficits.    MEDICATIONS  (STANDING):  acetylcysteine 10%  Inhalation 3 milliLiter(s) Inhalation three times a day  albuterol/ipratropium for Nebulization 3 milliLiter(s) Nebulizer every 6 hours  azithromycin  IVPB      azithromycin  IVPB 250 milliGRAM(s) IV Intermittent every 24 hours  cefTRIAXone Injectable. 1000 milliGRAM(s) IV Push every 24 hours  cholecalciferol 2000 Unit(s) Oral daily  heparin  Injectable 5000 Unit(s) SubCutaneous every 8 hours  hydrochlorothiazide 12.5 milliGRAM(s) Oral daily  lactobacillus acidophilus 1 Tablet(s) Oral daily  melatonin 5 milliGRAM(s) Oral at bedtime  methylPREDNISolone sodium succinate Injectable 40 milliGRAM(s) IV Push every 6 hours  multivitamin/minerals 1 Tablet(s) Oral daily  propranolol LA 60 milliGRAM(s) Oral daily                            11.5   8.24  )-----------( 210      ( 27 Dec 2019 06:44 )             35.2     12-27    142  |  106  |  46<H>  ----------------------------<  170<H>  3.5   |  31  |  1.01    Ca    8.7      27 Dec 2019 06:44  Phos  3.7     12-27  Mg     2.7     12-27        Culture - Blood (collected 24 Dec 2019 20:12)  Source: .Blood None  Preliminary Report (26 Dec 2019 02:04):    No growth to date.    Culture - Blood (collected 24 Dec 2019 20:12)  Source: .Blood None  Preliminary Report (26 Dec 2019 02:04):    No growth to date.

## 2019-12-27 NOTE — CHART NOTE - NSCHARTNOTEFT_GEN_A_CORE
Discharge planning for patient discussed with Malinda. Face to face required, and d/c likely tomorrow.

## 2019-12-28 ENCOUNTER — TRANSCRIPTION ENCOUNTER (OUTPATIENT)
Age: 84
End: 2019-12-28

## 2019-12-28 LAB
ANION GAP SERPL CALC-SCNC: 6 MMOL/L — SIGNIFICANT CHANGE UP (ref 5–17)
BUN SERPL-MCNC: 46 MG/DL — HIGH (ref 7–23)
CALCIUM SERPL-MCNC: 8.7 MG/DL — SIGNIFICANT CHANGE UP (ref 8.5–10.1)
CHLORIDE SERPL-SCNC: 105 MMOL/L — SIGNIFICANT CHANGE UP (ref 96–108)
CO2 SERPL-SCNC: 28 MMOL/L — SIGNIFICANT CHANGE UP (ref 22–31)
CREAT SERPL-MCNC: 1.06 MG/DL — SIGNIFICANT CHANGE UP (ref 0.5–1.3)
GLUCOSE SERPL-MCNC: 172 MG/DL — HIGH (ref 70–99)
POTASSIUM SERPL-MCNC: 3.6 MMOL/L — SIGNIFICANT CHANGE UP (ref 3.5–5.3)
POTASSIUM SERPL-SCNC: 3.6 MMOL/L — SIGNIFICANT CHANGE UP (ref 3.5–5.3)
SODIUM SERPL-SCNC: 139 MMOL/L — SIGNIFICANT CHANGE UP (ref 135–145)

## 2019-12-28 RX ORDER — AMLODIPINE BESYLATE 2.5 MG/1
1 TABLET ORAL
Qty: 0 | Refills: 0 | DISCHARGE
Start: 2019-12-28

## 2019-12-28 RX ORDER — AMLODIPINE BESYLATE 2.5 MG/1
1 TABLET ORAL
Qty: 14 | Refills: 0
Start: 2019-12-28 | End: 2020-01-10

## 2019-12-28 RX ORDER — AMLODIPINE BESYLATE 2.5 MG/1
2.5 TABLET ORAL DAILY
Refills: 0 | Status: DISCONTINUED | OUTPATIENT
Start: 2019-12-28 | End: 2019-12-29

## 2019-12-28 RX ORDER — HYDRALAZINE HCL 50 MG
2.5 TABLET ORAL ONCE
Refills: 0 | Status: COMPLETED | OUTPATIENT
Start: 2019-12-28 | End: 2019-12-28

## 2019-12-28 RX ADMIN — Medication 40 MILLIGRAM(S): at 12:09

## 2019-12-28 RX ADMIN — Medication 40 MILLIGRAM(S): at 00:52

## 2019-12-28 RX ADMIN — AMLODIPINE BESYLATE 2.5 MILLIGRAM(S): 2.5 TABLET ORAL at 22:40

## 2019-12-28 RX ADMIN — Medication 1 TABLET(S): at 12:09

## 2019-12-28 RX ADMIN — Medication 3 MILLILITER(S): at 13:27

## 2019-12-28 RX ADMIN — Medication 3 MILLILITER(S): at 21:14

## 2019-12-28 RX ADMIN — AZITHROMYCIN 252.5 MILLIGRAM(S): 500 TABLET, FILM COATED ORAL at 22:39

## 2019-12-28 RX ADMIN — Medication 12.5 MILLIGRAM(S): at 06:26

## 2019-12-28 RX ADMIN — Medication 2000 UNIT(S): at 12:09

## 2019-12-28 RX ADMIN — HEPARIN SODIUM 5000 UNIT(S): 5000 INJECTION INTRAVENOUS; SUBCUTANEOUS at 22:41

## 2019-12-28 RX ADMIN — HEPARIN SODIUM 5000 UNIT(S): 5000 INJECTION INTRAVENOUS; SUBCUTANEOUS at 15:00

## 2019-12-28 RX ADMIN — Medication 60 MILLIGRAM(S): at 06:26

## 2019-12-28 RX ADMIN — Medication 5 MILLIGRAM(S): at 22:40

## 2019-12-28 RX ADMIN — Medication 40 MILLIGRAM(S): at 22:54

## 2019-12-28 RX ADMIN — HEPARIN SODIUM 5000 UNIT(S): 5000 INJECTION INTRAVENOUS; SUBCUTANEOUS at 06:26

## 2019-12-28 RX ADMIN — Medication 2.5 MILLIGRAM(S): at 10:26

## 2019-12-28 RX ADMIN — CEFTRIAXONE 1000 MILLIGRAM(S): 500 INJECTION, POWDER, FOR SOLUTION INTRAMUSCULAR; INTRAVENOUS at 22:42

## 2019-12-28 RX ADMIN — Medication 3 MILLILITER(S): at 07:58

## 2019-12-28 NOTE — DISCHARGE NOTE PROVIDER - CARE PROVIDER_API CALL
Zack Maier)  Internal Medicine  [address], [address]  [place] [address]  Phone: [phone]  Fax: [phone]  Follow Up Time:     Jessica Werner)  Critical Care Medicine; Internal Medicine; Pulmonary Disease; Sleep Medicine  [address] [place] [address]  Phone: [phone]  Fax: [phone]  Follow Up Time:

## 2019-12-28 NOTE — PROGRESS NOTE ADULT - ASSESSMENT
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 bacterial superinfection  - mild hyperglycemia from steroids   - history of hiatal and diaphragmatic hernia   - hypoxia  - lymphadenopathy    PLAN   - Continue methylprednisolone 40 mg IV q6  - continue azithromycin, ceftriaxone   - continue pulmicort BID  - continue duonebs q6  - dvt ppx with heparin  - lymphadenopathy may be secondary to rsv, will need outpatient follow up  - does not need spiriva if she is taking duonebs q6  - cough suppressant medication, add tessalon or guiafenesin - COPD exacerbation from rsv infection, ? bacterial superinfection  - mild hyperglycemia from steroids   - history of hiatal and diaphragmatic hernia   - hypoxia  - lymphadenopathy    PLAN   - Continue methylprednisolone 40 mg IV q12  - continue azithromycin, ceftriaxone   - continue pulmicort BID  - continue duonebs q6  - dvt ppx with heparin  - lymphadenopathy may be secondary to rsv, will need outpatient follow up  - mucamyst  - ambulatory pulse ox eval prior to d/c

## 2019-12-28 NOTE — PROGRESS NOTE ADULT - SUBJECTIVE AND OBJECTIVE BOX
Subjective:    Review of Systems:  All 10 systems reviewed in detailed and found to be negative with the exception of what has already been described above    Allergies:  codeine (Nausea)  sulfa drugs (Unknown)    Meds  MEDICATIONS  (STANDING):  acetylcysteine 10%  Inhalation 3 milliLiter(s) Inhalation three times a day  albuterol/ipratropium for Nebulization 3 milliLiter(s) Nebulizer every 6 hours  azithromycin  IVPB      azithromycin  IVPB 250 milliGRAM(s) IV Intermittent every 24 hours  cefTRIAXone Injectable. 1000 milliGRAM(s) IV Push every 24 hours  cholecalciferol 2000 Unit(s) Oral daily  heparin  Injectable 5000 Unit(s) SubCutaneous every 8 hours  hydrALAZINE Injectable 2.5 milliGRAM(s) IV Push once  hydrochlorothiazide 12.5 milliGRAM(s) Oral daily  lactobacillus acidophilus 1 Tablet(s) Oral daily  melatonin 5 milliGRAM(s) Oral at bedtime  methylPREDNISolone sodium succinate Injectable 40 milliGRAM(s) IV Push every 12 hours  multivitamin/minerals 1 Tablet(s) Oral daily  propranolol LA 60 milliGRAM(s) Oral daily    MEDICATIONS  (PRN):  acetaminophen   Tablet .. 650 milliGRAM(s) Oral every 6 hours PRN Temp greater or equal to 38C (100.4F), Mild Pain (1 - 3)    Physical Exam  T(C): 36.2 (12-28-19 @ 09:35), Max: 36.7 (12-28-19 @ 05:16)  HR: 66 (12-28-19 @ 09:35) (64 - 69)  BP: 181/71 (12-28-19 @ 09:35) (102/66 - 181/71)  RR: 20 (12-28-19 @ 09:35) (19 - 20)  SpO2: 100% (12-28-19 @ 09:35) (98% - 100%)  Gen: Alert, oriented, no distress  HEENT: Anicteric sclera, moist mucous membranes, no JVD, no lymphadenopathy, good dentition  Cardio: Regular rhythm and rate, normal S1S2, no murmurs  Resp: Wheezing bilaterally  GI: Nontender, nondistended, normoactive bowel sounds  Ext: Chronic stasis changes  Neuro: Nonfocal    Labs:                        11.5   8.24  )-----------( 210      ( 27 Dec 2019 06:44 )             35.2     12-28    139  |  105  |  46<H>  ----------------------------<  172<H>  3.6   |  28  |  1.06    Ca    8.7      28 Dec 2019 08:22  Phos  3.7     12-27  Mg     2.7     12-27    < from: CT Chest No Cont (12.24.19 @ 21:53) >  FINDINGS:    LUNGS AND AIRWAYS: Bronchial wall thickening with areas of mucous plugging in both lower lobes.  Emphysematous changes. 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 Opacity again seen at the lateral aspect of the right lower lobe (series 2 image 77) which was present on 9/4/2019 and 5/26/2019, not significantly changed in size since 9/4/2019 (some areas of consolidation are increased in density). Opacities in the left lower lobe have improved since 9/4/2019. Calcified granulomas and scattered noncalcified pulmonary nodules are unchanged since 9/4/2019. Opacity/nodules at the left lung apex are unchanged since 5/26/2019 (series 2 image21).    PLEURA: No pleural effusion.    MEDIASTINUM AND ELIO: Enlarged lymph nodes including a 1.4 x 1.2 cm precarinal lymph node, previously normal in size measuring 1.0 x 0.7 cm (series 2 image 44).    VESSELS: Thoracic aorta normal in caliber withmild to moderate calcified plaque. Coronary artery calcifications.    HEART: Heart size is normal. No pericardial effusion.    CHEST WALL AND LOWER NECK: No enlarged axillary lymph nodes.    VISUALIZED UPPER ABDOMEN: Extensive vascular calcificationsare noted. Tortuous abdominal aorta.    BONES: No aggressive osseous lesion.    IMPRESSION:     Right lower lobe opacity, not significantly changed in size since 9/4/2019 with some areas increased in density, differential includes atelectasis or pneumonia.    Interval improvement in left lower lobe opacities since 9/4/2019.    Bilateral bronchial wall thickening with areas of mucous plugging in both lower lobes; differential includes reactive airways disease or bronchitis.    Mildly enlarged mediastinal lymph nodes, new since 9/4/2019.    Continued follow-up is recommended. Subjective:  Had elevated BP today needing hydralazine  Still wheezing  Feels breathing is improved    Review of Systems:  All 10 systems reviewed in detailed and found to be negative with the exception of what has already been described above    Allergies:  codeine (Nausea)  sulfa drugs (Unknown)    Meds  MEDICATIONS  (STANDING):  acetylcysteine 10%  Inhalation 3 milliLiter(s) Inhalation three times a day  albuterol/ipratropium for Nebulization 3 milliLiter(s) Nebulizer every 6 hours  azithromycin  IVPB      azithromycin  IVPB 250 milliGRAM(s) IV Intermittent every 24 hours  cefTRIAXone Injectable. 1000 milliGRAM(s) IV Push every 24 hours  cholecalciferol 2000 Unit(s) Oral daily  heparin  Injectable 5000 Unit(s) SubCutaneous every 8 hours  hydrALAZINE Injectable 2.5 milliGRAM(s) IV Push once  hydrochlorothiazide 12.5 milliGRAM(s) Oral daily  lactobacillus acidophilus 1 Tablet(s) Oral daily  melatonin 5 milliGRAM(s) Oral at bedtime  methylPREDNISolone sodium succinate Injectable 40 milliGRAM(s) IV Push every 12 hours  multivitamin/minerals 1 Tablet(s) Oral daily  propranolol LA 60 milliGRAM(s) Oral daily    MEDICATIONS  (PRN):  acetaminophen   Tablet .. 650 milliGRAM(s) Oral every 6 hours PRN Temp greater or equal to 38C (100.4F), Mild Pain (1 - 3)    Physical Exam  T(C): 36.2 (12-28-19 @ 09:35), Max: 36.7 (12-28-19 @ 05:16)  HR: 66 (12-28-19 @ 09:35) (64 - 69)  BP: 181/71 (12-28-19 @ 09:35) (102/66 - 181/71)  RR: 20 (12-28-19 @ 09:35) (19 - 20)  SpO2: 100% (12-28-19 @ 09:35) (98% - 100%)  Gen: Alert, slightly confused, no distress  HEENT: Anicteric sclera, moist mucous membranes, no JVD, no lymphadenopathy  Cardio: Regular rhythm and rate, normal S1S2, no murmurs  Resp: Wheezing bilaterally  GI: Nontender, nondistended, normoactive bowel sounds  Ext: Chronic stasis changes  Neuro: Nonfocal    Labs:                        11.5   8.24  )-----------( 210      ( 27 Dec 2019 06:44 )             35.2     12-28    139  |  105  |  46<H>  ----------------------------<  172<H>  3.6   |  28  |  1.06    Ca    8.7      28 Dec 2019 08:22  Phos  3.7     12-27  Mg     2.7     12-27    < from: CT Chest No Cont (12.24.19 @ 21:53) >  FINDINGS:    LUNGS AND AIRWAYS: Bronchial wall thickening with areas of mucous plugging in both lower lobes.  Emphysematous changes. 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 Opacity again seen at the lateral aspect of the right lower lobe (series 2 image 77) which was present on 9/4/2019 and 5/26/2019, not significantly changed in size since 9/4/2019 (some areas of consolidation are increased in density). Opacities in the left lower lobe have improved since 9/4/2019. Calcified granulomas and scattered noncalcified pulmonary nodules are unchanged since 9/4/2019. Opacity/nodules at the left lung apex are unchanged since 5/26/2019 (series 2 image21).    PLEURA: No pleural effusion.    MEDIASTINUM AND ELIO: Enlarged lymph nodes including a 1.4 x 1.2 cm precarinal lymph node, previously normal in size measuring 1.0 x 0.7 cm (series 2 image 44).    VESSELS: Thoracic aorta normal in caliber withmild to moderate calcified plaque. Coronary artery calcifications.    HEART: Heart size is normal. No pericardial effusion.    CHEST WALL AND LOWER NECK: No enlarged axillary lymph nodes.    VISUALIZED UPPER ABDOMEN: Extensive vascular calcificationsare noted. Tortuous abdominal aorta.    BONES: No aggressive osseous lesion.    IMPRESSION:     Right lower lobe opacity, not significantly changed in size since 9/4/2019 with some areas increased in density, differential includes atelectasis or pneumonia.    Interval improvement in left lower lobe opacities since 9/4/2019.    Bilateral bronchial wall thickening with areas of mucous plugging in both lower lobes; differential includes reactive airways disease or bronchitis.    Mildly enlarged mediastinal lymph nodes, new since 9/4/2019.    Continued follow-up is recommended.

## 2019-12-28 NOTE — DISCHARGE NOTE PROVIDER - NSDCMRMEDTOKEN_GEN_ALL_CORE_FT
Advair Diskus 250 mcg-50 mcg inhalation powder: 1 puff(s) inhaled 2 times a day  Albuterol inh solution: Inhale 1 vial via nebulizer once daily as needed  ***pt doesn&#x27;t know strength***  hydroCHLOROthiazide 12.5 mg oral capsule: 1 cap(s) orally once a day  PreserVision AREDS 2 oral capsule: 1 cap(s) orally once a day  propranolol 60 mg oral capsule, extended release: 1 cap(s) orally once a day  Spiriva HandiHaler 18 mcg inhalation capsule: 1 cap(s) inhaled once a day  Vitamin D3 2000 intl units oral capsule: 2 cap(s) orally once a day Advair Diskus 250 mcg-50 mcg inhalation powder: 1 puff(s) inhaled 2 times a day  Albuterol inh solution: Inhale 1 vial via nebulizer once daily as needed  ***pt doesn&#x27;t know strength***  amLODIPine 2.5 mg oral tablet: 1 tab(s) orally once a day  hydroCHLOROthiazide 12.5 mg oral capsule: 1 cap(s) orally once a day  PreserVision AREDS 2 oral capsule: 1 cap(s) orally once a day  propranolol 60 mg oral capsule, extended release: 1 cap(s) orally once a day  Spiriva HandiHaler 18 mcg inhalation capsule: 1 cap(s) inhaled once a day  Vitamin D3 2000 intl units oral capsule: 2 cap(s) orally once a day Advair Diskus 250 mcg-50 mcg inhalation powder: 1 puff(s) inhaled 2 times a day  Albuterol inh solution: Inhale 1 vial via nebulizer once daily as needed  ***pt doesn&#x27;t know strength***  amLODIPine 2.5 mg oral tablet: 1 tab(s) orally once a day  cefuroxime 500 mg oral tablet: 1 tab(s) orally 2 times a day   hydroCHLOROthiazide 12.5 mg oral capsule: 1 cap(s) orally once a day  PreserVision AREDS 2 oral capsule: 1 cap(s) orally once a day  propranolol 60 mg oral capsule, extended release: 1 cap(s) orally once a day  Spiriva HandiHaler 18 mcg inhalation capsule: 1 cap(s) inhaled once a day  Vitamin D3 2000 intl units oral capsule: 2 cap(s) orally once a day Advair Diskus 250 mcg-50 mcg inhalation powder: 1 puff(s) inhaled 2 times a day  Albuterol inh solution: Inhale 1 vial via nebulizer once daily as needed  ***pt doesn&#x27;t know strength***  amLODIPine 2.5 mg oral tablet: 1 tab(s) orally once a day  cefuroxime 500 mg oral tablet: 1 tab(s) orally 2 times a day   hydroCHLOROthiazide 12.5 mg oral capsule: 1 cap(s) orally once a day  predniSONE 20 mg oral tablet: 2 tab(s) orally once a day   PreserVision AREDS 2 oral capsule: 1 cap(s) orally once a day  propranolol 60 mg oral capsule, extended release: 1 cap(s) orally once a day  Spiriva HandiHaler 18 mcg inhalation capsule: 1 cap(s) inhaled once a day  Vitamin D3 2000 intl units oral capsule: 2 cap(s) orally once a day

## 2019-12-28 NOTE — DISCHARGE NOTE PROVIDER - NSDCCPCAREPLAN_GEN_ALL_CORE_FT
PRINCIPAL DISCHARGE DIAGNOSIS  Diagnosis: COPD exacerbation  Assessment and Plan of Treatment: COPD exacerbation, with respiratory syncytial virus infection with supposed bacterial Pneumonia  -Treated with intravenous ceftriaxone and azithromycin and IV steroids  -Blood cultures done were negative  -Continue albuterol nebulizations, advaid diskus and spiriva  -Continue to follow up with Pulmonologist and Primary care physican         SECONDARY DISCHARGE DIAGNOSES  Diagnosis: Hypertension  Assessment and Plan of Treatment: -Continue Hydrochlorthiazide 12.5mg daily  -Start Amlodipine 2.5mg daily  -Continue to monitor with Primary care physician    Diagnosis: Hypokalemia  Assessment and Plan of Treatment: -You were treated for low potassium levels  -Levels now normal  -Continue to monitor with Primary care physician      Diagnosis: Steroid-induced hyperglycemia  Assessment and Plan of Treatment: -Continue to monitor blood glucose levels with Primary care physician PRINCIPAL DISCHARGE DIAGNOSIS  Diagnosis: COPD exacerbation  Assessment and Plan of Treatment: COPD exacerbation, with respiratory syncytial virus infection with supposed bacterial Pneumonia  -Treated with intravenous ceftriaxone and azithromycin and IV steroids  -Blood cultures done were negative  -Continue albuterol nebulizations, advaid diskus and spiriva  -Continue oxygen as needed   -Continue to follow up with Pulmonologist and Primary care physican         SECONDARY DISCHARGE DIAGNOSES  Diagnosis: Hypertension  Assessment and Plan of Treatment: -Continue Hydrochlorthiazide 12.5mg daily  -Start Amlodipine 2.5mg daily  -Continue to monitor with Primary care physician    Diagnosis: Hypokalemia  Assessment and Plan of Treatment: -You were treated for low potassium levels  -Levels now normal  -Continue to monitor with Primary care physician      Diagnosis: Steroid-induced hyperglycemia  Assessment and Plan of Treatment: -Continue to monitor blood glucose levels with Primary care physician PRINCIPAL DISCHARGE DIAGNOSIS  Diagnosis: COPD exacerbation  Assessment and Plan of Treatment: COPD exacerbation, with respiratory syncytial virus infection with supposed bacterial Pneumonia  -Treated with intravenous ceftriaxone and azithromycin and IV steroids  -Blood cultures done were negative  -Continue albuterol nebulizations, advaid diskus and spiriva  -Continue oxygen as needed   -Continue to follow up with Pulmonologist and Primary care physican   - continue 2 more days of abx after discharge.        SECONDARY DISCHARGE DIAGNOSES  Diagnosis: Hypertension  Assessment and Plan of Treatment: -Continue Hydrochlorthiazide 12.5mg daily  -Start Amlodipine 2.5mg daily  -Continue to monitor with Primary care physician    Diagnosis: Hypokalemia  Assessment and Plan of Treatment: -You were treated for low potassium levels  -Levels now normal  -Continue to monitor with Primary care physician      Diagnosis: Steroid-induced hyperglycemia  Assessment and Plan of Treatment: -Continue to monitor blood glucose levels with Primary care physician

## 2019-12-28 NOTE — DISCHARGE NOTE PROVIDER - CARE PROVIDERS DIRECT ADDRESSES
,[email or web address]

## 2019-12-28 NOTE — DISCHARGE NOTE PROVIDER - HOSPITAL COURSE
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 Treated with ceftriaxone and azithromycin. Patient had 2 episodes of elevated blood pressure during hospital course, amlodipine 2.5mg daily was added to patient's current hypertensive treatment. 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 Treated with ceftriaxone and azithromycin. Patient had 2 episodes of elevated blood pressure during hospital course, amlodipine 2.5mg daily was added to patient's current hypertensive treatment. Patient co-managed with Pulmonology. Patient was noted with some confusion and anxiety, stating that she feels detached from reality, has no hallucinations or suicidal ideations likely due to steroid use. Plan to discharge patient home with home PT, to continue to follow up with primary care physician and Pulmonologist. Patient agrees with plan, all questions and concerns addressed.         12/28/2019:    Subjective: Patient seen and examined at bedside in no apparent distress, c/o feeling detached from reality, has no hallucinations, suicidal ideations. Patient denies fever, chills, headaches, dizziness, chest pain, cough, palpitations, nausea, vomiting, diarrhea, abdominal pain, leg pain, leg edema, or weakness.        Vital Signs Last 24 Hrs    T(C): 36.3 (28 Dec 2019 12:10), Max: 36.7 (28 Dec 2019 05:16)    T(F): 97.3 (28 Dec 2019 12:10), Max: 98.1 (28 Dec 2019 05:16)    HR: 62 (28 Dec 2019 13:34) (60 - 69)    BP: 146/49 (28 Dec 2019 12:10) (140/51 - 182/72)    RR: 19 (28 Dec 2019 12:10) (19 - 20)    SpO2: 99% (28 Dec 2019 13:34) (98% - 100%)        PHYSICAL EXAM:    GENERAL: NAD, lying in bed comfortably    HEAD:  Atraumatic, Normocephalic    EYES: EOMI, PERRLA, conjunctiva and sclera clear    ENT: Moist mucous membranes    NECK: Supple, No JVD    CHEST/LUNG: Diminished breath sounds b/l, bilateral expiratory wheezing    HEART: Regular rate and rhythm; No murmurs, rubs, or gallops    ABDOMEN: Bowel sounds present; Soft, Nontender, Nondistended. No hepatomegally    EXTREMITIES:  2+ Peripheral Pulses, brisk capillary refill. No clubbing, cyanosis, or edema    NERVOUS SYSTEM:  Alert & Oriented X3, speech clear. No deficits     MSK: FROM all 4 extremities, full and equal strength    SKIN: No rashes or lesions

## 2019-12-29 VITALS — OXYGEN SATURATION: 93 % | RESPIRATION RATE: 18 BRPM

## 2019-12-29 RX ORDER — CEFUROXIME AXETIL 250 MG
1 TABLET ORAL
Qty: 4 | Refills: 0
Start: 2019-12-29 | End: 2019-12-30

## 2019-12-29 RX ADMIN — Medication 1 TABLET(S): at 11:57

## 2019-12-29 RX ADMIN — AMLODIPINE BESYLATE 2.5 MILLIGRAM(S): 2.5 TABLET ORAL at 06:31

## 2019-12-29 RX ADMIN — Medication 3 MILLILITER(S): at 07:59

## 2019-12-29 RX ADMIN — Medication 3 MILLILITER(S): at 14:40

## 2019-12-29 RX ADMIN — HEPARIN SODIUM 5000 UNIT(S): 5000 INJECTION INTRAVENOUS; SUBCUTANEOUS at 05:21

## 2019-12-29 RX ADMIN — Medication 60 MILLIGRAM(S): at 05:21

## 2019-12-29 RX ADMIN — Medication 2000 UNIT(S): at 11:58

## 2019-12-29 RX ADMIN — Medication 12.5 MILLIGRAM(S): at 05:21

## 2019-12-29 NOTE — PROGRESS NOTE ADULT - ASSESSMENT
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 bacterial superinfection  - mild hyperglycemia from steroids   - history of hiatal and diaphragmatic hernia   - hypoxia  - lymphadenopathy    PLAN   - Off steroids, recommend taper  - continue azithromycin, ceftriaxone   - continue pulmicort BID  - continue duonebs q6  - dvt ppx with heparin  - lymphadenopathy may be secondary to rsv, will need outpatient follow up  - mucamyst  - ambulatory pulse ox eval prior to d/c

## 2019-12-29 NOTE — PROGRESS NOTE ADULT - SUBJECTIVE AND OBJECTIVE BOX
HPI: Pt is a 89 yo female who presented to ED for worsening shortness of breath associated with productive cough of yellow green sputum which she describes as thick, runny nose, body aches. Pt states her daughter had been taking care of her at home and had been suffering from a cold. Symptoms began 4 days prior to admission. Pt states she continued to become gradually more SOB and needed to use her oxygen last night for the first time ever. Pt states she was given home O2 after leaving [facility] for PRN use. Pt also endorses decreased appetite and two days of urinary hesitancy, frequency, and dysuria for which she was using cranberry juice at home to treat as she states she is prone to UTI.     SUBJECTIVE: Pt seen and examined at bedside, in no apparent distress, states that she is feeling much better and denies any fever, sob, and reports her cough is improving. Pt will bed discharged home with 2 days of abx and prednisone. Discussion held with patient to follow up with her PCP and Pt daughter guanako will pick her up today. Pt is stable for discharge.    REVIEW OF SYSTEMS:  CONSTITUTIONAL: No weakness, fevers or chills  EYES/ENT: No visual changes;  No vertigo or throat pain   NECK: No pain or stiffness  RESPIRATORY: No cough, wheezing, hemoptysis; no shortness of breath  CARDIOVASCULAR: No chest pain or palpitations  GASTROINTESTINAL: No abdominal or epigastric pain. No nausea, vomiting, or hematemesis; No diarrhea or constipation. No melena or hematochezia.  GENITOURINARY: No dysuria, frequency or hematuria  NEUROLOGICAL: No numbness or weakness  SKIN: No itching, rashes    T(C): 36.3 (12-29-19 @ 11:22)  T(F): 97.4 (12-29-19 @ 11:22), Max: 97.8 (12-29-19 @ 04:59)  HR: 63 (12-29-19 @ 11:22) (62 - 70)  BP: 158/68 (12-29-19 @ 11:22) (152/67 - 172/54)  RR:  (17 - 20)  SpO2:  (100% - 100%)      GENERAL: NAD, lying in bed comfortably  HEAD:  Atraumatic, Normocephalic  EYES: EOMI, PERRLA, conjunctiva and sclera clear  ENT: Moist mucous membranes  NECK: Supple, No JVD  CHEST/LUNG:  CTA bilaterally. no crackles, rales or ronchi, mild expiratory wheezing bilaterally.  HEART: Regular rate and rhythm; No murmurs, rubs, or gallops  ABDOMEN: Bowel sounds present; Soft, Nontender, Nondistended. No hepatomegally  EXTREMITIES:  2+ Peripheral Pulses, brisk capillary refill. No clubbing, cyanosis, or edema  NERVOUS SYSTEM:  Alert & Oriented X3, speech clear. No deficits   MSK: FROM all 4 extremities, full and equal strength  SKIN: No rashes or lesions    MEDICATIONS  (STANDING):  acetylcysteine 10%  Inhalation 3 milliLiter(s) Inhalation three times a day  albuterol/ipratropium for Nebulization 3 milliLiter(s) Nebulizer every 6 hours  amLODIPine   Tablet 2.5 milliGRAM(s) Oral daily  azithromycin  IVPB      azithromycin  IVPB 250 milliGRAM(s) IV Intermittent every 24 hours  cefTRIAXone Injectable. 1000 milliGRAM(s) IV Push every 24 hours  cholecalciferol 2000 Unit(s) Oral daily  heparin  Injectable 5000 Unit(s) SubCutaneous every 8 hours  hydrochlorothiazide 12.5 milliGRAM(s) Oral daily  lactobacillus acidophilus 1 Tablet(s) Oral daily  melatonin 5 milliGRAM(s) Oral at bedtime  multivitamin/minerals 1 Tablet(s) Oral daily  propranolol LA 60 milliGRAM(s) Oral daily    MEDICATIONS  (PRN):  acetaminophen   Tablet .. 650 milliGRAM(s) Oral every 6 hours PRN Temp greater or equal to 38C (100.4F), Mild Pain (1 - 3)      Labs          28 Dec 2019 08:22    139    |  105    |  46     ----------------------------<  172    3.6     |  28     |  1.06     Ca    8.7        28 Dec 2019 08:22        CAPILLARY BLOOD GLUCOSE      POCT Blood Glucose.: 154 mg/dL (28 Dec 2019 17:28)        Hemoglobin A1C, Whole Blood: 6.0 % (12-25 @ 08:21)

## 2019-12-29 NOTE — PROGRESS NOTE ADULT - ATTENDING COMMENTS
Patient seen and examined with Family Medicine Residents Claudia Frank and Xiomara Daniel on the Family Medicine Teaching Service.  Agree with history, physical, labs and plan which were reviewed in detail after a face to face encounter with the patient.

## 2019-12-29 NOTE — PROGRESS NOTE ADULT - SUBJECTIVE AND OBJECTIVE BOX
"Anesthesia Transfer of Care Note    Patient: Dina Martíenz    Procedure(s) Performed: Procedure(s) (LRB):  COLONOSCOPY (N/A)    Patient location: GI    Anesthesia Type: MAC    Transport from OR: Transported from OR on room air with adequate spontaneous ventilation    Post pain: adequate analgesia    Post assessment: no apparent anesthetic complications and tolerated procedure well    Post vital signs: stable    Level of consciousness: awake, alert and oriented    Nausea/Vomiting: no nausea/vomiting    Complications: none    Transfer of care protocol was followed      Last vitals:   Visit Vitals  /81 (BP Location: Left arm, Patient Position: Lying)   Pulse 81   Temp 37 °C (98.6 °F) (Tympanic)   Resp 16   Ht 5' 7" (1.702 m)   Wt 124.7 kg (275 lb)   LMP 10/25/2015   SpO2 98%   Breastfeeding? No   BMI 43.07 kg/m²     " Subjective:    Review of Systems:  All 10 systems reviewed in detailed and found to be negative with the exception of what has already been described above    Allergies:  codeine (Nausea)  sulfa drugs (Unknown)    Meds  MEDICATIONS  (STANDING):  acetylcysteine 10%  Inhalation 3 milliLiter(s) Inhalation three times a day  albuterol/ipratropium for Nebulization 3 milliLiter(s) Nebulizer every 6 hours  amLODIPine   Tablet 2.5 milliGRAM(s) Oral daily  azithromycin  IVPB      azithromycin  IVPB 250 milliGRAM(s) IV Intermittent every 24 hours  cefTRIAXone Injectable. 1000 milliGRAM(s) IV Push every 24 hours  cholecalciferol 2000 Unit(s) Oral daily  heparin  Injectable 5000 Unit(s) SubCutaneous every 8 hours  hydrochlorothiazide 12.5 milliGRAM(s) Oral daily  lactobacillus acidophilus 1 Tablet(s) Oral daily  melatonin 5 milliGRAM(s) Oral at bedtime  multivitamin/minerals 1 Tablet(s) Oral daily  propranolol LA 60 milliGRAM(s) Oral daily    MEDICATIONS  (PRN):  acetaminophen   Tablet .. 650 milliGRAM(s) Oral every 6 hours PRN Temp greater or equal to 38C (100.4F), Mild Pain (1 - 3)    Physical Exam  T(C): 36.6 (12-29-19 @ 04:59), Max: 36.6 (12-29-19 @ 04:59)  HR: 70 (12-29-19 @ 08:13) (60 - 70)  BP: 152/67 (12-29-19 @ 04:59) (146/49 - 182/72)  RR: 20 (12-29-19 @ 04:59) (19 - 20)  SpO2: 100% (12-29-19 @ 08:13) (99% - 100%)  Gen: Alert, slightly confused, no distress  HEENT: Anicteric sclera, moist mucous membranes, no JVD, no lymphadenopathy  Cardio: Regular rhythm and rate, normal S1S2, no murmurs  Resp: Wheezing bilaterally  GI: Nontender, nondistended, normoactive bowel sounds  Ext: Chronic stasis changes  Neuro: Nonfocal    Labs:    12-28    139  |  105  |  46<H>  ----------------------------<  172<H>  3.6   |  28  |  1.06    Ca    8.7      28 Dec 2019 08:22      < from: CT Chest No Cont (12.24.19 @ 21:53) >  FINDINGS:    LUNGS AND AIRWAYS: Bronchial wall thickening with areas of mucous plugging in both lower lobes.  Emphysematous changes. 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 Opacity again seen at the lateral aspect of the right lower lobe (series 2 image 77) which was present on 9/4/2019 and 5/26/2019, not significantly changed in size since 9/4/2019 (some areas of consolidation are increased in density). Opacities in the left lower lobe have improved since 9/4/2019. Calcified granulomas and scattered noncalcified pulmonary nodules are unchanged since 9/4/2019. Opacity/nodules at the left lung apex are unchanged since 5/26/2019 (series 2 image21).    PLEURA: No pleural effusion.    MEDIASTINUM AND ELIO: Enlarged lymph nodes including a 1.4 x 1.2 cm precarinal lymph node, previously normal in size measuring 1.0 x 0.7 cm (series 2 image 44).    VESSELS: Thoracic aorta normal in caliber withmild to moderate calcified plaque. Coronary artery calcifications.    HEART: Heart size is normal. No pericardial effusion.    CHEST WALL AND LOWER NECK: No enlarged axillary lymph nodes.    VISUALIZED UPPER ABDOMEN: Extensive vascular calcificationsare noted. Tortuous abdominal aorta.    BONES: No aggressive osseous lesion.    IMPRESSION:     Right lower lobe opacity, not significantly changed in size since 9/4/2019 with some areas increased in density, differential includes atelectasis or pneumonia.    Interval improvement in left lower lobe opacities since 9/4/2019.    Bilateral bronchial wall thickening with areas of mucous plugging in both lower lobes; differential includes reactive airways disease or bronchitis.    Mildly enlarged mediastinal lymph nodes, new since 9/4/2019.    Continued follow-up is recommended. Subjective:  Feels better  No complaints    Review of Systems:  All 10 systems reviewed in detailed and found to be negative with the exception of what has already been described above    Allergies:  codeine (Nausea)  sulfa drugs (Unknown)    Meds  MEDICATIONS  (STANDING):  acetylcysteine 10%  Inhalation 3 milliLiter(s) Inhalation three times a day  albuterol/ipratropium for Nebulization 3 milliLiter(s) Nebulizer every 6 hours  amLODIPine   Tablet 2.5 milliGRAM(s) Oral daily  azithromycin  IVPB      azithromycin  IVPB 250 milliGRAM(s) IV Intermittent every 24 hours  cefTRIAXone Injectable. 1000 milliGRAM(s) IV Push every 24 hours  cholecalciferol 2000 Unit(s) Oral daily  heparin  Injectable 5000 Unit(s) SubCutaneous every 8 hours  hydrochlorothiazide 12.5 milliGRAM(s) Oral daily  lactobacillus acidophilus 1 Tablet(s) Oral daily  melatonin 5 milliGRAM(s) Oral at bedtime  multivitamin/minerals 1 Tablet(s) Oral daily  propranolol LA 60 milliGRAM(s) Oral daily    MEDICATIONS  (PRN):  acetaminophen   Tablet .. 650 milliGRAM(s) Oral every 6 hours PRN Temp greater or equal to 38C (100.4F), Mild Pain (1 - 3)    Physical Exam  T(C): 36.6 (12-29-19 @ 04:59), Max: 36.6 (12-29-19 @ 04:59)  HR: 70 (12-29-19 @ 08:13) (60 - 70)  BP: 152/67 (12-29-19 @ 04:59) (146/49 - 182/72)  RR: 20 (12-29-19 @ 04:59) (19 - 20)  SpO2: 100% (12-29-19 @ 08:13) (99% - 100%)  Gen: Alert, slightly confused, no distress  HEENT: Anicteric sclera, moist mucous membranes, no JVD, no lymphadenopathy  Cardio: Regular rhythm and rate, normal S1S2, no murmurs  Resp: Wheezing bilaterally  GI: Nontender, nondistended, normoactive bowel sounds  Ext: Chronic stasis changes  Neuro: Nonfocal    Labs:    12-28    139  |  105  |  46<H>  ----------------------------<  172<H>  3.6   |  28  |  1.06    Ca    8.7      28 Dec 2019 08:22      < from: CT Chest No Cont (12.24.19 @ 21:53) >  FINDINGS:    LUNGS AND AIRWAYS: Bronchial wall thickening with areas of mucous plugging in both lower lobes.  Emphysematous changes. 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 Opacity again seen at the lateral aspect of the right lower lobe (series 2 image 77) which was present on 9/4/2019 and 5/26/2019, not significantly changed in size since 9/4/2019 (some areas of consolidation are increased in density). Opacities in the left lower lobe have improved since 9/4/2019. Calcified granulomas and scattered noncalcified pulmonary nodules are unchanged since 9/4/2019. Opacity/nodules at the left lung apex are unchanged since 5/26/2019 (series 2 image21).    PLEURA: No pleural effusion.    MEDIASTINUM AND ELIO: Enlarged lymph nodes including a 1.4 x 1.2 cm precarinal lymph node, previously normal in size measuring 1.0 x 0.7 cm (series 2 image 44).    VESSELS: Thoracic aorta normal in caliber withmild to moderate calcified plaque. Coronary artery calcifications.    HEART: Heart size is normal. No pericardial effusion.    CHEST WALL AND LOWER NECK: No enlarged axillary lymph nodes.    VISUALIZED UPPER ABDOMEN: Extensive vascular calcificationsare noted. Tortuous abdominal aorta.    BONES: No aggressive osseous lesion.    IMPRESSION:     Right lower lobe opacity, not significantly changed in size since 9/4/2019 with some areas increased in density, differential includes atelectasis or pneumonia.    Interval improvement in left lower lobe opacities since 9/4/2019.    Bilateral bronchial wall thickening with areas of mucous plugging in both lower lobes; differential includes reactive airways disease or bronchitis.    Mildly enlarged mediastinal lymph nodes, new since 9/4/2019.    Continued follow-up is recommended.

## 2019-12-29 NOTE — PROGRESS NOTE ADULT - ASSESSMENT
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    COPD exacerbation, Acute, 2/2 RSV URI with supposed bacterial PNA  - [place]  - discharged on po prednisone  - continue 2 more days of abx outpatient  -Supplemental O2   -Pulmonary consult appreciated  -Blood cultures NGTD    Hypokalemia   - resolved    Hyperglycemia  -Accucheck qAC, qHS  -Preprandial a.m. BGM <180: Will  consider carb restriction  and HISS if continues to be elevated  -f/u HbA1c as outpatient     Elevated d dimer  -age adjusted wnl  -WELLS criteria: 0     CKD III  -elevated BUN with 1.25cr, as per HIE at baseline  -avoid nephrotoxic medications, cont HCTZ as baseline  -strict i/o's    HTN  -Continue propanolol LA 60 mg qd  -Continue HCTZ 12.5mg po daily     DVT ppx  -Heparin SubQ 5000u q8      Above Discussed and patient seen with  Dr. Lizarraga

## 2020-01-02 DIAGNOSIS — J44.0 CHRONIC OBSTRUCTIVE PULMONARY DISEASE WITH (ACUTE) LOWER RESPIRATORY INFECTION: ICD-10-CM

## 2020-01-02 DIAGNOSIS — E43 UNSPECIFIED SEVERE PROTEIN-CALORIE MALNUTRITION: ICD-10-CM

## 2020-01-02 DIAGNOSIS — R09.02 HYPOXEMIA: ICD-10-CM

## 2020-01-02 DIAGNOSIS — J44.1 CHRONIC OBSTRUCTIVE PULMONARY DISEASE WITH (ACUTE) EXACERBATION: ICD-10-CM

## 2020-01-02 DIAGNOSIS — R63.0 ANOREXIA: ICD-10-CM

## 2020-01-02 DIAGNOSIS — J15.9 UNSPECIFIED BACTERIAL PNEUMONIA: ICD-10-CM

## 2020-01-02 DIAGNOSIS — I73.9 PERIPHERAL VASCULAR DISEASE, UNSPECIFIED: ICD-10-CM

## 2020-01-02 DIAGNOSIS — N18.3 CHRONIC KIDNEY DISEASE, STAGE 3 (MODERATE): ICD-10-CM

## 2020-01-02 DIAGNOSIS — N39.0 URINARY TRACT INFECTION, SITE NOT SPECIFIED: ICD-10-CM

## 2020-01-02 DIAGNOSIS — Z88.6 ALLERGY STATUS TO ANALGESIC AGENT: ICD-10-CM

## 2020-01-02 DIAGNOSIS — H40.9 UNSPECIFIED GLAUCOMA: ICD-10-CM

## 2020-01-02 DIAGNOSIS — J06.9 ACUTE UPPER RESPIRATORY INFECTION, UNSPECIFIED: ICD-10-CM

## 2020-01-02 DIAGNOSIS — M19.90 UNSPECIFIED OSTEOARTHRITIS, UNSPECIFIED SITE: ICD-10-CM

## 2020-01-02 DIAGNOSIS — Z79.51 LONG TERM (CURRENT) USE OF INHALED STEROIDS: ICD-10-CM

## 2020-01-02 DIAGNOSIS — E87.6 HYPOKALEMIA: ICD-10-CM

## 2020-01-02 DIAGNOSIS — R73.9 HYPERGLYCEMIA, UNSPECIFIED: ICD-10-CM

## 2020-01-02 DIAGNOSIS — B97.4 RESPIRATORY SYNCYTIAL VIRUS AS THE CAUSE OF DISEASES CLASSIFIED ELSEWHERE: ICD-10-CM

## 2020-01-02 DIAGNOSIS — Z88.2 ALLERGY STATUS TO SULFONAMIDES: ICD-10-CM

## 2020-01-02 DIAGNOSIS — T38.0X5A ADVERSE EFFECT OF GLUCOCORTICOIDS AND SYNTHETIC ANALOGUES, INITIAL ENCOUNTER: ICD-10-CM

## 2020-01-02 DIAGNOSIS — R59.0 LOCALIZED ENLARGED LYMPH NODES: ICD-10-CM

## 2020-01-02 DIAGNOSIS — I12.9 HYPERTENSIVE CHRONIC KIDNEY DISEASE WITH STAGE 1 THROUGH STAGE 4 CHRONIC KIDNEY DISEASE, OR UNSPECIFIED CHRONIC KIDNEY DISEASE: ICD-10-CM

## 2021-03-23 NOTE — ED PROVIDER NOTE - CPE EDP SKIN NORM
"-- DO NOT REPLY / DO NOT REPLY ALL --  -- Message is from the [employer]--     Patient is requesting a medication refill - medication is on active medication list    Patient is currently OUT of the requested medication. Was Medication Pended? Yes. Rx Name and Dose:  albuterol (VENTOLIN HFA) 108 (90 Base) MCG/ACT inhalerÂ     Duration: 90 days    Pharmacy  [id number] [id number] - [address]    Patient confirmed the above pharmacy as correct? Yes    Caller Information       Type Contact Phone    03/23/2021 10:25 AM CDT Phone (Incoming) Thi Potter (Self) [phone] (M)          Alternative phone number: [phone]    Turnaround time given to caller: ""This message will be sent to [facility] Provider's name]. The clinical team will fulfill your request as soon as they review your message. \""  " normal...

## 2021-08-24 ENCOUNTER — EMERGENCY (EMERGENCY)
Facility: HOSPITAL | Age: 86
LOS: 0 days | Discharge: HOME | End: 2021-08-25
Attending: FAMILY MEDICINE
Payer: MEDICARE

## 2021-08-24 VITALS
HEIGHT: 57 IN | RESPIRATION RATE: 16 BRPM | OXYGEN SATURATION: 95 % | WEIGHT: 99.21 LBS | DIASTOLIC BLOOD PRESSURE: 132 MMHG | TEMPERATURE: 98 F | SYSTOLIC BLOOD PRESSURE: 219 MMHG | HEART RATE: 79 BPM

## 2021-08-24 DIAGNOSIS — J44.9 CHRONIC OBSTRUCTIVE PULMONARY DISEASE, UNSPECIFIED: ICD-10-CM
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
## 2021-08-24 DIAGNOSIS — S51.011A LACERATION WITHOUT FOREIGN BODY OF RIGHT ELBOW, INITIAL ENCOUNTER: ICD-10-CM

## 2021-08-24 DIAGNOSIS — S09.90XA UNSPECIFIED INJURY OF HEAD, INITIAL ENCOUNTER: ICD-10-CM

## 2021-08-24 DIAGNOSIS — Z88.2 ALLERGY STATUS TO SULFONAMIDES: ICD-10-CM

## 2021-08-24 DIAGNOSIS — S01.01XA LACERATION WITHOUT FOREIGN BODY OF SCALP, INITIAL ENCOUNTER: ICD-10-CM

## 2021-08-24 DIAGNOSIS — Z88.5 ALLERGY STATUS TO NARCOTIC AGENT: ICD-10-CM

## 2021-08-24 DIAGNOSIS — M19.90 UNSPECIFIED OSTEOARTHRITIS, UNSPECIFIED SITE: ICD-10-CM

## 2021-08-24 DIAGNOSIS — Z20.822 CONTACT WITH AND (SUSPECTED) EXPOSURE TO COVID-19: ICD-10-CM

## 2021-08-24 PROCEDURE — 72125 CT NECK SPINE W/O DYE: CPT | Mod: 26,MH

## 2021-08-24 PROCEDURE — 73610 X-RAY EXAM OF ANKLE: CPT | Mod: 26,LT

## 2021-08-24 PROCEDURE — 72125 CT NECK SPINE W/O DYE: CPT

## 2021-08-24 PROCEDURE — U0005: CPT

## 2021-08-24 PROCEDURE — 72170 X-RAY EXAM OF PELVIS: CPT

## 2021-08-24 PROCEDURE — 93005 ELECTROCARDIOGRAM TRACING: CPT | Mod: XU

## 2021-08-24 PROCEDURE — 99284 EMERGENCY DEPT VISIT MOD MDM: CPT | Mod: 25

## 2021-08-24 PROCEDURE — 12004 RPR S/N/AX/GEN/TRK7.6-12.5CM: CPT

## 2021-08-24 PROCEDURE — 70450 CT HEAD/BRAIN W/O DYE: CPT

## 2021-08-24 PROCEDURE — 73610 X-RAY EXAM OF ANKLE: CPT | Mod: LT

## 2021-08-24 PROCEDURE — 73080 X-RAY EXAM OF ELBOW: CPT | Mod: 26,RT

## 2021-08-24 PROCEDURE — 70450 CT HEAD/BRAIN W/O DYE: CPT | Mod: 26,MH

## 2021-08-24 PROCEDURE — 72170 X-RAY EXAM OF PELVIS: CPT | Mod: 26

## 2021-08-24 PROCEDURE — U0003: CPT

## 2021-08-24 PROCEDURE — 73080 X-RAY EXAM OF ELBOW: CPT | Mod: RT

## 2021-08-24 PROCEDURE — 93010 ELECTROCARDIOGRAM REPORT: CPT

## 2021-08-24 NOTE — ED PROVIDER NOTE - ENMT, MLM
Airway patent, Nasal mucosa clear. Mouth with normal mucosa. Throat has no vesicles, no oropharyngeal exudates and uvula is midline. Head: +4cm swelling to R side of head with 3cm laceration.

## 2021-08-24 NOTE — ED PROVIDER NOTE - PATIENT PORTAL LINK FT
You can access the FollowMyHealth Patient Portal offered by [facility] by registering at the following website: [email or web address]. By joining [facility]’s FollowMyHealth portal, you will also be able to view your health information using other applications (apps) compatible with our system.

## 2021-08-24 NOTE — ED PROVIDER NOTE - CLINICAL SUMMARY MEDICAL DECISION MAKING FREE TEXT BOX
pt with hx of HTN, COPD, PVD, HTN, CKD, here after mechanical fall at home with head injury and multiple lacerations. Plan; x ray, CT head and neck and laceration repair.

## 2021-08-24 NOTE — ED PROVIDER NOTE - PROGRESS NOTE DETAILS
as per nurse pt can walk, able to be d/c [place] will call family B Elena PUENTE slept all night, will give additional tylenol and get ready for d/c nurse soke with pt daughter VIRGIL Elena PUENTE so from Dr. Juarez, dispo pending ambulation trial, as per nurse pt can walk, able to be d/c [facility] will call family B Elena PUENTE

## 2021-08-24 NOTE — ED PROVIDER NOTE - OBJECTIVE STATEMENT
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 No LOC. Pt sustained a laceration to R head, R elbow, and LLE. Not on blood thinners. Pt lives at home alone. Non smoker. No drug use. Unknown last Tetanus.

## 2021-08-24 NOTE — ED PROVIDER NOTE - SKIN, MLM
Skin warm, dry. +6cm laceration to the R elbow. +approx 4cm skin tear/laceration to the L lateral ankle. +multiple ecchymotic areas to arms and legs.

## 2021-08-24 NOTE — ED ADULT TRIAGE NOTE - CHIEF COMPLAINT QUOTE
Patient presents to ed with ems sp mechanical fall at home. pt states she was tyring to kill a spider, not on blood thinners no LOC. pt with laceration to head and right shoulder, bleeding controlled with wrap applied by ems.

## 2021-08-24 NOTE — ED ADULT TRIAGE NOTE - ARRIVAL FROM
Render Post-Care Instructions In Note?: no Detail Level: Detailed Price (Use Numbers Only, No Special Characters Or $): 50 Post-Care Instructions: I reviewed with the patient in detail post-care instructions. Patient is to wear sunprotection, and avoid picking at any of the treated lesions. Pt may apply Vaseline to crusted or scabbing areas. Consent: The patient's consent was obtained including but not limited to risks of crusting, scabbing, blistering, scarring, darker or lighter pigmentary change, recurrence, incomplete removal and infection. The patient understands that the procedure is cosmetic in nature and is not covered by insurance. Home

## 2021-08-24 NOTE — ED PROVIDER NOTE - NSFOLLOWUPINSTRUCTIONS_ED_ALL_ED_FT
tylenol for pain  return in 7 to 10 days for suture removal    WHAT YOU NEED TO KNOW:    A head injury can include your scalp, face, skull, or brain and range from mild to severe. Effects can appear immediately after the injury or develop later. The effects may last a short time or be permanent. Healthcare providers may want to check your recovery over time. Treatment may change as you recover or develop new health problems from the head injury.    DISCHARGE INSTRUCTIONS:    Call your local emergency number (911 in the ), or have someone else call if:   •You cannot be woken.      •You have a seizure.      •You stop responding to others or you faint.      •You have blurry or double vision.      •Your speech becomes slurred or confused.      •You have arm or leg weakness, loss of feeling, or new problems with coordination.      •Your pupils are larger than usual, or one pupil is a different size than the other.      •You have blood or clear fluid coming out of your ears or nose.      Return to the emergency department if:   •You have repeated or forceful vomiting.      •You feel confused.      •Your headache gets worse or becomes severe.      •You or someone caring for you notices that you are harder to wake than usual.      Call your doctor if:   •Your symptoms last longer than 6 weeks after the injury.      •You have questions or concerns about your condition or care.      Medicines:   •Acetaminophen decreases pain and fever. It is available without a doctor's order. Ask how much to take and how often to take it. Follow directions. Read the labels of all other medicines you are using to see if they also contain acetaminophen, or ask your doctor or pharmacist. Acetaminophen can cause liver damage if not taken correctly. Do not use more than 4 grams (4,000 milligrams) total of acetaminophen in one day.       •Take your medicine as directed. Contact your healthcare provider if you think your medicine is not helping or if you have side effects. Tell him or her if you are allergic to any medicine. Keep a list of the medicines, vitamins, and herbs you take. Include the amounts, and when and why you take them. Bring the list or the pill bottles to follow-up visits. Carry your medicine list with you in case of an emergency.      Self-care:   •Rest or do quiet activities. Limit your time watching TV, using the computer, or doing tasks that require a lot of thinking. Slowly return to your normal activities as directed. Do not play sports or do activities that may cause you to get hit in the head. Ask your healthcare provider when you can return to sports.      •Apply ice on your head for 15 to 20 minutes every hour or as directed. Use an ice pack, or put crushed ice in a plastic bag. Cover it with a towel before you apply it to your skin. Ice helps prevent tissue damage and decreases swelling and pain.      •Have someone stay with you for 24 hours , or as directed. This person can monitor you for problems and call for help if needed. When you are awake, the person should ask you a few questions every few hours to see if you are thinking clearly. An example is to ask your name or address.      Prevent another head injury:   •Wear a helmet that fits properly. Do this when you play sports, or ride a bike, scooter, or skateboard. Helmets help decrease your risk for a serious head injury. Talk to your healthcare provider about other ways you can protect yourself if you play sports.      •Wear your seatbelt every time you are in a car. This helps lower your risk for a head injury if you are in a car accident.      Follow up with your doctor as directed: Write down your questions so you remember to ask them during your visits.       © Copyright [employer] 2021           back to top                          © Copyright [employer] 2021

## 2021-08-25 VITALS
SYSTOLIC BLOOD PRESSURE: 148 MMHG | TEMPERATURE: 98 F | OXYGEN SATURATION: 97 % | DIASTOLIC BLOOD PRESSURE: 56 MMHG | HEART RATE: 66 BPM | RESPIRATION RATE: 17 BRPM

## 2021-08-25 RX ORDER — AMLODIPINE BESYLATE 2.5 MG/1
5 TABLET ORAL ONCE
Refills: 0 | Status: COMPLETED | OUTPATIENT
Start: 2021-08-25 | End: 2021-08-25

## 2021-08-25 RX ORDER — ACETAMINOPHEN 500 MG
650 TABLET ORAL ONCE
Refills: 0 | Status: COMPLETED | OUTPATIENT
Start: 2021-08-25 | End: 2021-08-25

## 2021-08-25 RX ADMIN — Medication 650 MILLIGRAM(S): at 06:31

## 2021-08-25 RX ADMIN — AMLODIPINE BESYLATE 5 MILLIGRAM(S): 2.5 TABLET ORAL at 06:31

## 2021-08-25 RX ADMIN — Medication 650 MILLIGRAM(S): at 01:22

## 2021-08-25 NOTE — CHART NOTE - NSCHARTNOTEFT_GEN_A_CORE
CM met with patient at bedside, role explained. Pt resides alone in a private ranch home with 3 steps to enter. Pt uses a rollator to ambualte and has home O2 but states she has not needed to use it recently. PMD is Dr. Amezquita. Pt fell at home and was brought to the ER. Ambulation trial done. Pt weak but able to ambulate with minial assistance. Pt has been cleared for dc home. CM discussed KENDRA with patient and pt is not interested in going to rehab. CM contacted pts daughter Lavinia Ramires. tel . Daughter is on her way from [place], she will be staying with the patient in her home for a few days. Pt also has a private hire aide-Susmha who takes pt to doctor appts, does the food shopping and other chores. Pt states she will be asking Sushma to work more hours. Home PT was discussed. Pt declined

## 2021-08-25 NOTE — ED ADULT NURSE REASSESSMENT NOTE - NS ED NURSE REASSESS COMMENT FT1
attempted to reach out to patients daughter to update on patients status. unsuccessful, left voice mail.

## 2021-09-06 ENCOUNTER — TRANSCRIPTION ENCOUNTER (OUTPATIENT)
Age: 86
End: 2021-09-06

## 2022-03-11 NOTE — ED ADULT NURSE NOTE - NS ED NOTE ABUSE SUSPICION NEGLECT YN
Problem: Self Care Deficits Care Plan (Adult)  Goal: *Acute Goals and Plan of Care (Insert Text)  Outcome: Progressing Towards Goal  Note: 1. Patient will perform grooming with supervision. 2. Patient will perform Upper body dressing with supervision  3. Patient will perform lower body dressing with supervision  4. Patient will perform upper and lower body bathing with supervision. 5. Patient will perform toilet transfers with supervision. 6. Patient will perform shower transfer with supervision. 7. Patient will participate in 30 + minutes of ADL/ therapeutic exercise/therapeutic activity with min rest breaks to increase activity tolerance for self care. 8. Patient will perform ADL functional mobility in room with supervision. Goals to be achieved in 7 days. OCCUPATIONAL THERAPY: Initial Assessment, Daily Note, and AM 3/11/2022  OBSERVATION: OT Visit Days: 1  Payor: Deepti Garcia OF [place]ICARE / Plan: Cayla Ernst OF [place] MEDICARE HMO/PPO / Product Type: [employer] Medicare /      NAME/AGE/GENDER: Yuki No is a 68 y.o. male   PRIMARY DIAGNOSIS:  GI bleed [K92.2] <principal problem not specified> <principal problem not specified>  Procedure(s) (LRB):  ESOPHAGOGASTRODUODENOSCOPY (EGD) (N/A)     ICD-10: Treatment Diagnosis:    Generalized Muscle Weakness (M62.81)   Precautions/Allergies:     Omnipaque  [iohexol], Iodine, and Unable to obtain      ASSESSMENT:     Mr. Cong Contreras presents with above diagnosis and was seen in room with PT. Pt is noted to have generalized weakness, decreased activity tolerance and decreased balance. Pt lives alone and is not quite at his baseline of function and would benefit from OT while in the hospital to maximize safety and independence with self care. Pt up in room with oxygen in place and using cane. Pt to sink for grooming to including oral hygiene. Pt left up in recliner with all needs.      This section established at most recent assessment   PROBLEM LIST (Impairments causing functional limitations):  Decreased Strength  Decreased ADL/Functional Activities  Decreased Balance  Decreased Activity Tolerance   INTERVENTIONS PLANNED: (Benefits and precautions of occupational therapy have been discussed with the patient.)  Activities of daily living training  Adaptive equipment training  Balance training  Therapeutic activity  Therapeutic exercise     TREATMENT PLAN: Frequency/Duration: Follow patient 3 times per week to address above goals. Rehabilitation Potential For Stated Goals: Good     REHAB RECOMMENDATIONS (at time of discharge pending progress):    Placement: It is my opinion, based on this patient's performance to date, that Mr. Cong Contreras may benefit from from [facility] PT  Equipment:   None at this time              OCCUPATIONAL PROFILE AND HISTORY:   History of Present Injury/Illness (Reason for Referral):  See H&P  Past Medical History/Comorbidities:   Mr. Cong Contreras  has a past medical history of BPH (benign prostatic hyperplasia), CHF (NYHA class III, ACC/AHA stage C) ([address] Chronic kidney disease, CKD (chronic kidney disease), COPD (chronic obstructive pulmonary disease) ([address] Coronary atherosclerosis of native coronary artery, Diabetes mellitus with circulatory complication ([address] DM neuropathies ([address] Dyslipidemia, GERD (gastroesophageal reflux disease), Hyperlipidemia, Hypertension, Ischemic cardiomyopathy, Mitral regurgitation, and PVD (peripheral vascular disease) ([address] Mr. Cong Contreras  has a past surgical history that includes hx carotid endarterectomy and hx coronary artery bypass graft (2005).   Social History/Living Environment:   Home Environment: Apartment  # Steps to Enter: 15  One/Two Story Residence: One story  Living Alone: Yes  Support Systems: Friend/Neighbor  Patient Expects to be Discharged to[de-identified] Home  Current DME Used/Available at Home: [employer], rolling,Cane, straight  Prior Level of Function/Work/Activity:  Independent with all activities of daily living to include driving. Number of Personal Factors/Comorbidities that affect the Plan of Care: Brief history (0):  LOW COMPLEXITY   ASSESSMENT OF OCCUPATIONAL PERFORMANCE[de-identified]   Activities of Daily Living:   Basic ADLs (From Assessment) Complex ADLs (From Assessment)   Feeding: Independent  Oral Facial Hygiene/Grooming: Stand-by assistance  Bathing: Minimum assistance  Upper Body Dressing: Stand-by assistance  Lower Body Dressing: Minimum assistance  Toileting: Minimum assistance     Grooming/Bathing/Dressing Activities of Daily Living     Cognitive Retraining  Safety/Judgement: Awareness of environment                 Functional Transfers  Bathroom Mobility: Contact guard assistance  Toilet Transfer : Contact guard assistance  Shower Transfer: Minimum assistance     Bed/Mat Mobility  Rolling: Independent  Supine to Sit: Stand-by assistance  Sit to Supine: Stand-by assistance  Sit to Stand: Contact guard assistance  Stand to Sit: Stand-by assistance  Bed to Chair: Contact guard assistance  Scooting: Stand-by assistance     Most Recent Physical Functioning:   Gross Assessment:                  Posture:     Balance:  Sitting: Intact  Standing: Pull to stand; With support Bed Mobility:  Rolling: Independent  Supine to Sit: Stand-by assistance  Sit to Supine: Stand-by assistance  Scooting: Stand-by assistance  Wheelchair Mobility:     Transfers:  Sit to Stand: Contact guard assistance  Stand to Sit: Stand-by assistance  Bed to Chair: Contact guard assistance            Patient Vitals for the past 6 hrs:   BP BP Patient Position SpO2 O2 Flow Rate (L/min) Pulse   03/11/22 0651 119/68 At rest -- -- 83   03/11/22 0823 -- -- 92 % -- --   03/11/22 0935 129/71 -- (!) 86 % -- 87   03/11/22 0940 -- -- 92 % 2.5 l/min --   03/11/22 1109 127/64 At rest;Sitting 100 % -- 87       Mental Status  Neurologic State: Alert  Orientation Level: Oriented X4  Cognition: Appropriate for age attention/concentration  Perception: Appears intact  Perseveration: No perseveration noted  Safety/Judgement: Awareness of environment                          Physical Skills Involved:  Balance  Strength  Activity Tolerance Cognitive Skills Affected (resulting in the inability to perform in a timely and safe manner):  none Psychosocial Skills Affected:  Environmental Adaptation   Number of elements that affect the Plan of Care: 3-5:  MODERATE COMPLEXITY   CLINICAL DECISION MAKIN[address] [place] [address] AM-PAC 6 Clicks   Daily Activity Inpatient Short Form  How much help from another person does the patient currently need. .. Total A Lot A Little None   1. Putting on and taking off regular lower body clothing? [] 1   [] 2   [x] 3   [] 4   2. Bathing (including washing, rinsing, drying)? [] 1   [] 2   [x] 3   [] 4   3. Toileting, which includes using toilet, bedpan or urinal?   [] 1   [] 2   [x] 3   [] 4   4. Putting on and taking off regular upper body clothing? [] 1   [] 2   [x] 3   [] 4   5. Taking care of personal grooming such as brushing teeth? [] 1   [] 2   [] 3   [x] 4   6. Eating meals? [] 1   [] 2   [] 3   [x] 4   © , Trustees of [address] [place] [address], under license to [employer]. All rights reserved      Score:  Initial: 20 Most Recent: X (Date: -- )    Interpretation of Tool:  Represents activities that are increasingly more difficult (i.e. Bed mobility, Transfers, Gait). Use of outcome tool(s) and clinical judgement create a POC that gives a: LOW COMPLEXITY         TREATMENT:   (In addition to Assessment/Re-Assessment sessions the following treatments were rendered)     Pre-treatment Symptoms/Complaints:  pt with complaint of back pain, RN notified that pt takes pain everyday at home for back pain. Pain: Initial:      Post Session:  3     Self Care: (15 min): Procedure(s) (per grid) utilized to improve and/or restore self-care/home management as related to grooming and functional mobility .  Required minimal verbal and manual cueing to facilitate activities of daily living skills and compensatory activities. OT evaluation completed     Braces/Orthotics/Lines/Etc:   O2 Device: Nasal cannula  Treatment/Session Assessment:    Response to Treatment:  pt up in room tolerated well  Interdisciplinary Collaboration:   Physical Therapist  Occupational Therapist  Registered Nurse  After treatment position/precautions:   Up in chair  Bed/Chair-wheels locked  Call light within reach  RN notified   Compliance with Program/Exercises: Compliant all of the time, Will assess as treatment progresses. Recommendations/Intent for next treatment session: \"Next visit will focus on advancements to more challenging activities and reduction in assistance provided\".   Total Treatment Duration:  OT Patient Time In/Time Out  Time In: 1040  Time Out: [address] Angelita Lang No

## 2022-04-13 ENCOUNTER — INPATIENT (INPATIENT)
Facility: HOSPITAL | Age: 87
LOS: 4 days | Discharge: HOME | DRG: 602 | End: 2022-04-18
Attending: INTERNAL MEDICINE | Admitting: INTERNAL MEDICINE
Payer: MEDICARE

## 2022-04-13 VITALS
RESPIRATION RATE: 16 BRPM | HEIGHT: 57 IN | TEMPERATURE: 98 F | WEIGHT: 89.95 LBS | SYSTOLIC BLOOD PRESSURE: 142 MMHG | HEART RATE: 51 BPM | OXYGEN SATURATION: 90 % | DIASTOLIC BLOOD PRESSURE: 73 MMHG

## 2022-04-13 DIAGNOSIS — T14.8XXA OTHER INJURY OF UNSPECIFIED BODY REGION, INITIAL ENCOUNTER: ICD-10-CM

## 2022-04-13 LAB
ALBUMIN SERPL ELPH-MCNC: 3.6 G/DL — SIGNIFICANT CHANGE UP (ref 3.3–5)
ALP SERPL-CCNC: 63 U/L — SIGNIFICANT CHANGE UP (ref 40–120)
ALT FLD-CCNC: 12 U/L — SIGNIFICANT CHANGE UP (ref 12–78)
ANION GAP SERPL CALC-SCNC: 7 MMOL/L — SIGNIFICANT CHANGE UP (ref 5–17)
APTT BLD: 30.4 SEC — SIGNIFICANT CHANGE UP (ref 27.5–35.5)
AST SERPL-CCNC: 25 U/L — SIGNIFICANT CHANGE UP (ref 15–37)
BASOPHILS # BLD AUTO: 0.13 K/UL — SIGNIFICANT CHANGE UP (ref 0–0.2)
BASOPHILS NFR BLD AUTO: 1.4 % — SIGNIFICANT CHANGE UP (ref 0–2)
BILIRUB SERPL-MCNC: 0.4 MG/DL — SIGNIFICANT CHANGE UP (ref 0.2–1.2)
BUN SERPL-MCNC: 35 MG/DL — HIGH (ref 7–23)
CALCIUM SERPL-MCNC: 9.4 MG/DL — SIGNIFICANT CHANGE UP (ref 8.5–10.1)
CHLORIDE SERPL-SCNC: 105 MMOL/L — SIGNIFICANT CHANGE UP (ref 96–108)
CO2 SERPL-SCNC: 25 MMOL/L — SIGNIFICANT CHANGE UP (ref 22–31)
CREAT SERPL-MCNC: 1.4 MG/DL — HIGH (ref 0.5–1.3)
EGFR: 34 ML/MIN/1.73M2 — LOW
EOSINOPHIL # BLD AUTO: 0.07 K/UL — SIGNIFICANT CHANGE UP (ref 0–0.5)
EOSINOPHIL NFR BLD AUTO: 0.7 % — SIGNIFICANT CHANGE UP (ref 0–6)
GLUCOSE SERPL-MCNC: 84 MG/DL — SIGNIFICANT CHANGE UP (ref 70–99)
HCT VFR BLD CALC: 40 % — SIGNIFICANT CHANGE UP (ref 34.5–45)
HGB BLD-MCNC: 13.1 G/DL — SIGNIFICANT CHANGE UP (ref 11.5–15.5)
IMM GRANULOCYTES NFR BLD AUTO: 0.6 % — SIGNIFICANT CHANGE UP (ref 0–1.5)
INR BLD: 0.98 RATIO — SIGNIFICANT CHANGE UP (ref 0.88–1.16)
LACTATE SERPL-SCNC: 1.1 MMOL/L — SIGNIFICANT CHANGE UP (ref 0.7–2)
LYMPHOCYTES # BLD AUTO: 3.13 K/UL — SIGNIFICANT CHANGE UP (ref 1–3.3)
LYMPHOCYTES # BLD AUTO: 33.5 % — SIGNIFICANT CHANGE UP (ref 13–44)
MCHC RBC-ENTMCNC: 30.5 PG — SIGNIFICANT CHANGE UP (ref 27–34)
MCHC RBC-ENTMCNC: 32.8 GM/DL — SIGNIFICANT CHANGE UP (ref 32–36)
MCV RBC AUTO: 93.2 FL — SIGNIFICANT CHANGE UP (ref 80–100)
MONOCYTES # BLD AUTO: 0.7 K/UL — SIGNIFICANT CHANGE UP (ref 0–0.9)
MONOCYTES NFR BLD AUTO: 7.5 % — SIGNIFICANT CHANGE UP (ref 2–14)
NEUTROPHILS # BLD AUTO: 5.26 K/UL — SIGNIFICANT CHANGE UP (ref 1.8–7.4)
NEUTROPHILS NFR BLD AUTO: 56.3 % — SIGNIFICANT CHANGE UP (ref 43–77)
PLATELET # BLD AUTO: 256 K/UL — SIGNIFICANT CHANGE UP (ref 150–400)
POTASSIUM SERPL-MCNC: 4 MMOL/L — SIGNIFICANT CHANGE UP (ref 3.5–5.3)
POTASSIUM SERPL-SCNC: 4 MMOL/L — SIGNIFICANT CHANGE UP (ref 3.5–5.3)
PROT SERPL-MCNC: 7.7 GM/DL — SIGNIFICANT CHANGE UP (ref 6–8.3)
PROTHROM AB SERPL-ACNC: 11.4 SEC — SIGNIFICANT CHANGE UP (ref 10.5–13.4)
RBC # BLD: 4.29 M/UL — SIGNIFICANT CHANGE UP (ref 3.8–5.2)
RBC # FLD: 14.6 % — HIGH (ref 10.3–14.5)
SODIUM SERPL-SCNC: 137 MMOL/L — SIGNIFICANT CHANGE UP (ref 135–145)
WBC # BLD: 9.35 K/UL — SIGNIFICANT CHANGE UP (ref 3.8–10.5)
WBC # FLD AUTO: 9.35 K/UL — SIGNIFICANT CHANGE UP (ref 3.8–10.5)

## 2022-04-13 PROCEDURE — 73700 CT LOWER EXTREMITY W/O DYE: CPT | Mod: RT

## 2022-04-13 PROCEDURE — 80048 BASIC METABOLIC PNL TOTAL CA: CPT

## 2022-04-13 PROCEDURE — 93010 ELECTROCARDIOGRAM REPORT: CPT

## 2022-04-13 PROCEDURE — 85027 COMPLETE CBC AUTOMATED: CPT

## 2022-04-13 PROCEDURE — 85025 COMPLETE CBC W/AUTO DIFF WBC: CPT

## 2022-04-13 PROCEDURE — 99285 EMERGENCY DEPT VISIT HI MDM: CPT

## 2022-04-13 PROCEDURE — 93970 EXTREMITY STUDY: CPT

## 2022-04-13 PROCEDURE — 71045 X-RAY EXAM CHEST 1 VIEW: CPT | Mod: 26

## 2022-04-13 PROCEDURE — 85730 THROMBOPLASTIN TIME PARTIAL: CPT

## 2022-04-13 PROCEDURE — 94640 AIRWAY INHALATION TREATMENT: CPT

## 2022-04-13 PROCEDURE — 85652 RBC SED RATE AUTOMATED: CPT

## 2022-04-13 PROCEDURE — 85610 PROTHROMBIN TIME: CPT

## 2022-04-13 PROCEDURE — 73590 X-RAY EXAM OF LOWER LEG: CPT | Mod: 26,RT

## 2022-04-13 PROCEDURE — 80053 COMPREHEN METABOLIC PANEL: CPT

## 2022-04-13 PROCEDURE — 97116 GAIT TRAINING THERAPY: CPT | Mod: GP

## 2022-04-13 PROCEDURE — 36415 COLL VENOUS BLD VENIPUNCTURE: CPT

## 2022-04-13 PROCEDURE — 73610 X-RAY EXAM OF ANKLE: CPT | Mod: RT

## 2022-04-13 PROCEDURE — 97530 THERAPEUTIC ACTIVITIES: CPT | Mod: GP

## 2022-04-13 PROCEDURE — 76376 3D RENDER W/INTRP POSTPROCES: CPT

## 2022-04-13 PROCEDURE — 81003 URINALYSIS AUTO W/O SCOPE: CPT

## 2022-04-13 PROCEDURE — 86140 C-REACTIVE PROTEIN: CPT

## 2022-04-13 PROCEDURE — 97162 PT EVAL MOD COMPLEX 30 MIN: CPT | Mod: GP

## 2022-04-13 PROCEDURE — 83036 HEMOGLOBIN GLYCOSYLATED A1C: CPT

## 2022-04-13 RX ORDER — CEFTRIAXONE 500 MG/1
1000 INJECTION, POWDER, FOR SOLUTION INTRAMUSCULAR; INTRAVENOUS ONCE
Refills: 0 | Status: COMPLETED | OUTPATIENT
Start: 2022-04-13 | End: 2022-04-13

## 2022-04-13 RX ORDER — ACETAMINOPHEN 500 MG
650 TABLET ORAL EVERY 6 HOURS
Refills: 0 | Status: DISCONTINUED | OUTPATIENT
Start: 2022-04-13 | End: 2022-04-18

## 2022-04-13 RX ORDER — SODIUM CHLORIDE 9 MG/ML
500 INJECTION INTRAMUSCULAR; INTRAVENOUS; SUBCUTANEOUS ONCE
Refills: 0 | Status: COMPLETED | OUTPATIENT
Start: 2022-04-13 | End: 2022-04-13

## 2022-04-13 RX ORDER — VANCOMYCIN HCL 1 G
750 VIAL (EA) INTRAVENOUS ONCE
Refills: 0 | Status: COMPLETED | OUTPATIENT
Start: 2022-04-13 | End: 2022-04-13

## 2022-04-13 RX ORDER — AMLODIPINE BESYLATE 2.5 MG/1
2.5 TABLET ORAL ONCE
Refills: 0 | Status: COMPLETED | OUTPATIENT
Start: 2022-04-13 | End: 2022-04-13

## 2022-04-13 RX ADMIN — CEFTRIAXONE 1000 MILLIGRAM(S): 500 INJECTION, POWDER, FOR SOLUTION INTRAMUSCULAR; INTRAVENOUS at 22:29

## 2022-04-13 RX ADMIN — Medication 250 MILLIGRAM(S): at 21:46

## 2022-04-13 RX ADMIN — AMLODIPINE BESYLATE 2.5 MILLIGRAM(S): 2.5 TABLET ORAL at 23:41

## 2022-04-13 RX ADMIN — Medication 750 MILLIGRAM(S): at 22:47

## 2022-04-13 RX ADMIN — SODIUM CHLORIDE 500 MILLILITER(S): 9 INJECTION INTRAMUSCULAR; INTRAVENOUS; SUBCUTANEOUS at 22:29

## 2022-04-13 RX ADMIN — CEFTRIAXONE 100 MILLIGRAM(S): 500 INJECTION, POWDER, FOR SOLUTION INTRAMUSCULAR; INTRAVENOUS at 21:03

## 2022-04-13 RX ADMIN — SODIUM CHLORIDE 500 MILLILITER(S): 9 INJECTION INTRAMUSCULAR; INTRAVENOUS; SUBCUTANEOUS at 21:03

## 2022-04-13 NOTE — ED ADULT TRIAGE NOTE - CHIEF COMPLAINT QUOTE
pr presents to ED due complaints of right leg wound , pt sent by MD baldwin [phone] to r/o osteo non-healing wound

## 2022-04-13 NOTE — ED ADULT NURSE NOTE - CHIEF COMPLAINT QUOTE
pr presents to ED due complaints of right leg wound , pt sent by MD baldwin [phone] to r/o osteo non-healing wound

## 2022-04-13 NOTE — ED PROVIDER NOTE - OBJECTIVE STATEMENT
89 y/o female with a PMHx of benign essential tremor, CKD, COPD/Emphysema on home O2 only PRN, glaucoma, HTN, hyperglycemia on steroids, OA, PVD with chronic skin changes presents to the ED for evaluation of worsening right lower extremity wound x 3 days. Pt reports she initially sustained the wound in August of 2019 from a fall and reports it healed well, however, the wound re-opened approx. 3 weeks ago after she banged her leg. Since the wound has re-opened, pt reports her visiting nurse has been bandaging the wound and it was healing well but suddenly became worse 3 days ago. Denies fever and any other symptoms. No other complaints at this time. 89 y/o female with a PMHx of benign essential tremor, CKD, COPD/Emphysema on home O2 only PRN, glaucoma, HTN, hyperglycemia on steroids, OA, PVD with chronic skin changes presents to the ED for evaluation of worsening right lower extremity wound & pain x 3 days. Pt reports she initially sustained the wound in August of 2019 from a fall and reports it healed well, however, the wound re-opened approx. 3 weeks ago after she banged her leg. Since the wound has re-opened, pt reports her visiting nurse has been bandaging the wound and it was healing well but suddenly became worse 3 days ago. Denies fever and any other symptoms. No other complaints at this time.

## 2022-04-13 NOTE — ED PROVIDER NOTE - NSICDXPASTMEDICALHX_GEN_ALL_CORE_FT
PAST MEDICAL HISTORY:  Benign essential tremor     Emphysema/COPD     Glaucoma     H/O hyperglycemia     Hypertension     Osteoarthritis     PVD (peripheral vascular disease)

## 2022-04-14 LAB
A1C WITH ESTIMATED AVERAGE GLUCOSE RESULT: 6.1 % — HIGH (ref 4–5.6)
ALBUMIN SERPL ELPH-MCNC: 3.3 G/DL — SIGNIFICANT CHANGE UP (ref 3.3–5)
ALP SERPL-CCNC: 59 U/L — SIGNIFICANT CHANGE UP (ref 40–120)
ALT FLD-CCNC: 10 U/L — LOW (ref 12–78)
ANION GAP SERPL CALC-SCNC: 5 MMOL/L — SIGNIFICANT CHANGE UP (ref 5–17)
APPEARANCE UR: CLEAR — SIGNIFICANT CHANGE UP
APTT BLD: 30.2 SEC — SIGNIFICANT CHANGE UP (ref 27.5–35.5)
AST SERPL-CCNC: 26 U/L — SIGNIFICANT CHANGE UP (ref 15–37)
BASOPHILS # BLD AUTO: 0.13 K/UL — SIGNIFICANT CHANGE UP (ref 0–0.2)
BASOPHILS NFR BLD AUTO: 1.2 % — SIGNIFICANT CHANGE UP (ref 0–2)
BILIRUB SERPL-MCNC: 0.5 MG/DL — SIGNIFICANT CHANGE UP (ref 0.2–1.2)
BILIRUB UR-MCNC: NEGATIVE — SIGNIFICANT CHANGE UP
BUN SERPL-MCNC: 31 MG/DL — HIGH (ref 7–23)
CALCIUM SERPL-MCNC: 9.5 MG/DL — SIGNIFICANT CHANGE UP (ref 8.5–10.1)
CHLORIDE SERPL-SCNC: 107 MMOL/L — SIGNIFICANT CHANGE UP (ref 96–108)
CO2 SERPL-SCNC: 28 MMOL/L — SIGNIFICANT CHANGE UP (ref 22–31)
COLOR SPEC: YELLOW — SIGNIFICANT CHANGE UP
CREAT SERPL-MCNC: 1.2 MG/DL — SIGNIFICANT CHANGE UP (ref 0.5–1.3)
CRP SERPL-MCNC: 6 MG/L — HIGH
DIFF PNL FLD: NEGATIVE — SIGNIFICANT CHANGE UP
EGFR: 41 ML/MIN/1.73M2 — LOW
EOSINOPHIL # BLD AUTO: 0.13 K/UL — SIGNIFICANT CHANGE UP (ref 0–0.5)
EOSINOPHIL NFR BLD AUTO: 1.2 % — SIGNIFICANT CHANGE UP (ref 0–6)
ERYTHROCYTE [SEDIMENTATION RATE] IN BLOOD: 25 MM/HR — HIGH (ref 0–20)
ESTIMATED AVERAGE GLUCOSE: 128 MG/DL — HIGH (ref 68–114)
GLUCOSE SERPL-MCNC: 94 MG/DL — SIGNIFICANT CHANGE UP (ref 70–99)
GLUCOSE UR QL: NEGATIVE — SIGNIFICANT CHANGE UP
HCT VFR BLD CALC: 38.2 % — SIGNIFICANT CHANGE UP (ref 34.5–45)
HGB BLD-MCNC: 12.6 G/DL — SIGNIFICANT CHANGE UP (ref 11.5–15.5)
IMM GRANULOCYTES NFR BLD AUTO: 0.5 % — SIGNIFICANT CHANGE UP (ref 0–1.5)
INR BLD: 1.04 RATIO — SIGNIFICANT CHANGE UP (ref 0.88–1.16)
KETONES UR-MCNC: NEGATIVE — SIGNIFICANT CHANGE UP
LEUKOCYTE ESTERASE UR-ACNC: NEGATIVE — SIGNIFICANT CHANGE UP
LYMPHOCYTES # BLD AUTO: 3.24 K/UL — SIGNIFICANT CHANGE UP (ref 1–3.3)
LYMPHOCYTES # BLD AUTO: 30.4 % — SIGNIFICANT CHANGE UP (ref 13–44)
MCHC RBC-ENTMCNC: 30.6 PG — SIGNIFICANT CHANGE UP (ref 27–34)
MCHC RBC-ENTMCNC: 33 GM/DL — SIGNIFICANT CHANGE UP (ref 32–36)
MCV RBC AUTO: 92.7 FL — SIGNIFICANT CHANGE UP (ref 80–100)
MONOCYTES # BLD AUTO: 0.82 K/UL — SIGNIFICANT CHANGE UP (ref 0–0.9)
MONOCYTES NFR BLD AUTO: 7.7 % — SIGNIFICANT CHANGE UP (ref 2–14)
NEUTROPHILS # BLD AUTO: 6.3 K/UL — SIGNIFICANT CHANGE UP (ref 1.8–7.4)
NEUTROPHILS NFR BLD AUTO: 59 % — SIGNIFICANT CHANGE UP (ref 43–77)
NITRITE UR-MCNC: NEGATIVE — SIGNIFICANT CHANGE UP
PH UR: 6.5 — SIGNIFICANT CHANGE UP (ref 5–8)
PLATELET # BLD AUTO: 258 K/UL — SIGNIFICANT CHANGE UP (ref 150–400)
POTASSIUM SERPL-MCNC: 3.7 MMOL/L — SIGNIFICANT CHANGE UP (ref 3.5–5.3)
POTASSIUM SERPL-SCNC: 3.7 MMOL/L — SIGNIFICANT CHANGE UP (ref 3.5–5.3)
PROT SERPL-MCNC: 7.1 GM/DL — SIGNIFICANT CHANGE UP (ref 6–8.3)
PROT UR-MCNC: NEGATIVE — SIGNIFICANT CHANGE UP
PROTHROM AB SERPL-ACNC: 12.1 SEC — SIGNIFICANT CHANGE UP (ref 10.5–13.4)
RBC # BLD: 4.12 M/UL — SIGNIFICANT CHANGE UP (ref 3.8–5.2)
RBC # FLD: 14.5 % — SIGNIFICANT CHANGE UP (ref 10.3–14.5)
SODIUM SERPL-SCNC: 140 MMOL/L — SIGNIFICANT CHANGE UP (ref 135–145)
SP GR SPEC: 1.01 — SIGNIFICANT CHANGE UP (ref 1.01–1.02)
UROBILINOGEN FLD QL: NEGATIVE — SIGNIFICANT CHANGE UP
WBC # BLD: 10.67 K/UL — HIGH (ref 3.8–10.5)
WBC # FLD AUTO: 10.67 K/UL — HIGH (ref 3.8–10.5)

## 2022-04-14 PROCEDURE — 99222 1ST HOSP IP/OBS MODERATE 55: CPT

## 2022-04-14 PROCEDURE — 73700 CT LOWER EXTREMITY W/O DYE: CPT | Mod: 26,RT

## 2022-04-14 PROCEDURE — 76376 3D RENDER W/INTRP POSTPROCES: CPT | Mod: 26

## 2022-04-14 PROCEDURE — 93970 EXTREMITY STUDY: CPT | Mod: 26

## 2022-04-14 PROCEDURE — 73610 X-RAY EXAM OF ANKLE: CPT | Mod: 26,RT

## 2022-04-14 RX ORDER — FLUTICASONE PROPIONATE AND SALMETEROL 50; 250 UG/1; UG/1
1 POWDER ORAL; RESPIRATORY (INHALATION)
Qty: 0 | Refills: 0 | DISCHARGE

## 2022-04-14 RX ORDER — CHOLECALCIFEROL (VITAMIN D3) 125 MCG
2 CAPSULE ORAL
Qty: 0 | Refills: 0 | DISCHARGE

## 2022-04-14 RX ORDER — SODIUM HYPOCHLORITE 0.125 %
1 SOLUTION, NON-ORAL MISCELLANEOUS
Refills: 0 | Status: DISCONTINUED | OUTPATIENT
Start: 2022-04-14 | End: 2022-04-18

## 2022-04-14 RX ORDER — BUDESONIDE AND FORMOTEROL FUMARATE DIHYDRATE 160; 4.5 UG/1; UG/1
2 AEROSOL RESPIRATORY (INHALATION)
Refills: 0 | Status: DISCONTINUED | OUTPATIENT
Start: 2022-04-14 | End: 2022-04-18

## 2022-04-14 RX ORDER — HYDROCHLOROTHIAZIDE 25 MG
25 TABLET ORAL DAILY
Refills: 0 | Status: DISCONTINUED | OUTPATIENT
Start: 2022-04-14 | End: 2022-04-14

## 2022-04-14 RX ORDER — TIOTROPIUM BROMIDE 18 UG/1
1 CAPSULE ORAL; RESPIRATORY (INHALATION)
Qty: 0 | Refills: 0 | DISCHARGE

## 2022-04-14 RX ORDER — ACETAMINOPHEN 500 MG
2 TABLET ORAL
Qty: 0 | Refills: 0 | DISCHARGE

## 2022-04-14 RX ORDER — MULTIVIT-MIN/FERROUS GLUCONATE 9 MG/15 ML
1 LIQUID (ML) ORAL
Qty: 0 | Refills: 0 | DISCHARGE

## 2022-04-14 RX ORDER — CHOLECALCIFEROL (VITAMIN D3) 125 MCG
4000 CAPSULE ORAL DAILY
Refills: 0 | Status: DISCONTINUED | OUTPATIENT
Start: 2022-04-14 | End: 2022-04-18

## 2022-04-14 RX ORDER — TIOTROPIUM BROMIDE 18 UG/1
1 CAPSULE ORAL; RESPIRATORY (INHALATION) DAILY
Refills: 0 | Status: DISCONTINUED | OUTPATIENT
Start: 2022-04-14 | End: 2022-04-18

## 2022-04-14 RX ORDER — PROPRANOLOL HCL 160 MG
1 CAPSULE, EXTENDED RELEASE 24HR ORAL
Qty: 0 | Refills: 0 | DISCHARGE

## 2022-04-14 RX ORDER — VANCOMYCIN HCL 1 G
500 VIAL (EA) INTRAVENOUS
Refills: 0 | Status: DISCONTINUED | OUTPATIENT
Start: 2022-04-14 | End: 2022-04-14

## 2022-04-14 RX ORDER — HEPARIN SODIUM 5000 [USP'U]/ML
5000 INJECTION INTRAVENOUS; SUBCUTANEOUS EVERY 12 HOURS
Refills: 0 | Status: DISCONTINUED | OUTPATIENT
Start: 2022-04-14 | End: 2022-04-18

## 2022-04-14 RX ORDER — CEFEPIME 1 G/1
500 INJECTION, POWDER, FOR SOLUTION INTRAMUSCULAR; INTRAVENOUS EVERY 24 HOURS
Refills: 0 | Status: DISCONTINUED | OUTPATIENT
Start: 2022-04-14 | End: 2022-04-14

## 2022-04-14 RX ORDER — ALBUTEROL 90 UG/1
2 AEROSOL, METERED ORAL EVERY 6 HOURS
Refills: 0 | Status: DISCONTINUED | OUTPATIENT
Start: 2022-04-14 | End: 2022-04-18

## 2022-04-14 RX ORDER — CEFEPIME 1 G/1
1000 INJECTION, POWDER, FOR SOLUTION INTRAMUSCULAR; INTRAVENOUS DAILY
Refills: 0 | Status: DISCONTINUED | OUTPATIENT
Start: 2022-04-14 | End: 2022-04-18

## 2022-04-14 RX ORDER — ALBUTEROL 90 UG/1
2 AEROSOL, METERED ORAL
Qty: 0 | Refills: 0 | DISCHARGE

## 2022-04-14 RX ADMIN — Medication 1 TABLET(S): at 11:44

## 2022-04-14 RX ADMIN — TIOTROPIUM BROMIDE 1 CAPSULE(S): 18 CAPSULE ORAL; RESPIRATORY (INHALATION) at 09:17

## 2022-04-14 RX ADMIN — HEPARIN SODIUM 5000 UNIT(S): 5000 INJECTION INTRAVENOUS; SUBCUTANEOUS at 21:33

## 2022-04-14 RX ADMIN — Medication 100 MILLIGRAM(S): at 12:25

## 2022-04-14 RX ADMIN — CEFEPIME 100 MILLIGRAM(S): 1 INJECTION, POWDER, FOR SOLUTION INTRAMUSCULAR; INTRAVENOUS at 06:12

## 2022-04-14 RX ADMIN — HEPARIN SODIUM 5000 UNIT(S): 5000 INJECTION INTRAVENOUS; SUBCUTANEOUS at 11:45

## 2022-04-14 RX ADMIN — CEFEPIME 1000 MILLIGRAM(S): 1 INJECTION, POWDER, FOR SOLUTION INTRAMUSCULAR; INTRAVENOUS at 17:26

## 2022-04-14 RX ADMIN — BUDESONIDE AND FORMOTEROL FUMARATE DIHYDRATE 2 PUFF(S): 160; 4.5 AEROSOL RESPIRATORY (INHALATION) at 09:17

## 2022-04-14 RX ADMIN — Medication 100 MILLIGRAM(S): at 21:33

## 2022-04-14 RX ADMIN — Medication 1 APPLICATION(S): at 21:33

## 2022-04-14 RX ADMIN — BUDESONIDE AND FORMOTEROL FUMARATE DIHYDRATE 2 PUFF(S): 160; 4.5 AEROSOL RESPIRATORY (INHALATION) at 19:56

## 2022-04-14 RX ADMIN — Medication 4000 UNIT(S): at 11:48

## 2022-04-14 NOTE — DIETITIAN INITIAL EVALUATION ADULT - OTHER INFO
89 y/o F w/ PMH of HTN, essential tremor, OA, glaucoma, COPD, PVD, glaucoma, p/w RLE wound. Patient states her RLE wound has been worsening for the last 3 days. C/o pain at the site. At baseline patient lives at home and walks with assistive device.    Pt eating lunch at time of RD visit - only cottage cheese and fruit ~60% consumed. In chair at time of RD visit. States her UBW 90-93#. Admit wt 87.8#; denies recent wt loss. States she is happy w/ her wt and does not want to drink any oral nutrition supplements because "it is fatty" Noted to be thin, frail with severe muscle/ fat wasting in face and upper body. Noted some swelling in ankles 2/2 infected wound. RD encouraged protein-rich foods. On DASH/TLC diet, would rec'd to Liberalize diet to regular to maximize caloric and nutrient intake. See other recommendations below.

## 2022-04-14 NOTE — PHYSICAL THERAPY INITIAL EVALUATION ADULT - PERTINENT HX OF CURRENT PROBLEM, REHAB EVAL
89 y/o F w/ PMH of HTN, essential tremor, OA, glaucoma, COPD, PVD, glaucoma, p/w RLE wound. Pt with PMHx Right ankle ORIF at distal fibula. She states her RLE wound has been worsening for the last 3 days after wound re-opened about 3 weeks ago. C/o pain at the site. At baseline patient lives at home and walks with assistive device 89 y/o F w/ PMH of HTN, essential tremor, OA, glaucoma, COPD, PVD, glaucoma, p/w RLE wound. Pt with PMHx Right ankle ORIF at distal fibula. She states her RLE wound has been worsening for the last 3 days after wound re-opened about 3 weeks ago. At baseline patient lives at home and walks with assistive device as needed in community

## 2022-04-14 NOTE — CONSULT NOTE ADULT - SUBJECTIVE AND OBJECTIVE BOX
Patient is a 89y old  Female who presents with a chief complaint of wound (2022 05:23)    HPI:  89 y/o F w/ PMH of HTN, essential tremor, OA, glaucoma, COPD, PVD, glaucoma, p/w RLE wound. Patient states her RLE wound has been worsening for the last 3 days. C/o pain at the site. At baseline patient lives at home and walks with assistive device. Denies fever, chills, cough, runny nose, sore throat, nausea, vomiting, abdominal pain. Had CT RLE and was given vancomycin/cefepime.      PSH: ORIF   PMH: as above    Meds: per reconciliation sheet, noted below  MEDICATIONS  (STANDING):  budesonide 160 MICROgram(s)/formoterol 4.5 MICROgram(s) Inhaler 2 Puff(s) Inhalation two times a day  calcium carbonate 1250 mG  + Vitamin D (OsCal 500 + D) 1 Tablet(s) Oral daily  cefepime   IVPB 500 milliGRAM(s) IV Intermittent every 24 hours  cholecalciferol 4000 Unit(s) Oral daily  doxycycline hyclate Capsule 100 milliGRAM(s) Oral every 12 hours  heparin   Injectable 5000 Unit(s) SubCutaneous every 12 hours  propranolol 60 milliGRAM(s) Oral daily  tiotropium 18 MICROgram(s) Capsule 1 Capsule(s) Inhalation daily      Allergies    sulfa drugs (Unknown)    Intolerances    codeine (Nausea)    Social: no smoking, no alcohol, no illegal drugs; no recent travel, no exposure to TB  FAMILY HISTORY:  Patient&#x27;s mother is   pt cannot recall med hx    Patient&#x27;s father is   pt cannot recall med hx       no history of premature cardiovascular disease in first degree relatives    ROS: the patient denies fever, no chills, no HA, no dizziness, no sore throat, no blurry vision, no CP, no palpitations, no SOB, no cough, no abdominal pain, no diarrhea, no N/V, no dysuria, no leg pain, no claudication, no rash, no joint aches, no rectal pain or bleeding, no night sweats    All other systems reviewed and are negative    Vital Signs Last 24 Hrs  T(C): 36.7 (2022 07:16), Max: 36.8 (2022 03:35)  T(F): 98 (2022 07:16), Max: 98.2 (2022 03:35)  HR: 66 (2022 07:16) (51 - 84)  BP: 146/47 (2022 07:16) (135/58 - 191/78)  BP(mean): --  RR: 17 (2022 07:16) (16 - 17)  SpO2: 93% (2022 07:16) (90% - 99%)  Daily Height in cm: 144.78 (2022 18:20)    Daily     PE:  Constitutional: NAD   HEENT: NC/AT, EOMI, PERRLA, conjunctivae clear; ears and nose atraumatic; pharynx benign  Neck: supple; thyroid not palpable  Back: no tenderness  Respiratory: respiratory effort normal; clear to auscultation  Cardiovascular: S1S2 regular, no murmurs  Abdomen: soft, not tender, not distended, positive BS; liver and spleen WNL  Genitourinary: no suprapubic tenderness  Lymphatic: no LN palpable  Musculoskeletal: no muscle tenderness, no joint swelling or tenderness  Extremities: no pedal edema R ankle wound with slough, erythema, warmth   Neurological/ Psychiatric: AxOx3, Judgement and insight normal;  moving all extremities  Skin: no rashes; no palpable lesions    Labs: all available labs reviewed                        12.6   10.67 )-----------( 258      ( 2022 08:20 )             38.2     04-14    140  |  107  |  31<H>  ----------------------------<  94  3.7   |  28  |  1.20    Ca    9.5      2022 08:20    TPro  7.1  /  Alb  3.3  /  TBili  0.5  /  DBili  x   /  AST  26  /  ALT  10<L>  /  AlkPhos  59  04-14     LIVER FUNCTIONS - ( 2022 08:20 )  Alb: 3.3 g/dL / Pro: 7.1 gm/dL / ALK PHOS: 59 U/L / ALT: 10 U/L / AST: 26 U/L / GGT: x           Urinalysis Basic - ( 2022 23:44 )    Color: Yellow / Appearance: Clear / S.010 / pH: x  Gluc: x / Ketone: Negative  / Bili: Negative / Urobili: Negative   Blood: x / Protein: Negative / Nitrite: Negative   Leuk Esterase: Negative / RBC: x / WBC x   Sq Epi: x / Non Sq Epi: x / Bacteria: x          Radiology: all available radiological tests reviewed  < from: CT Ankle No Cont, Right (22 @ 02:18) >  ACC: [id number] EXAM:  CT 3D RECONSTRUCT MATILDE SAAB                        ACC: [id number] EXAM:  CT ANKLE ONLY RT                          PROCEDURE DATE:  2022          INTERPRETATION:  CT OF THE RIGHT ANKLE WITHOUT CONTRAST.    CLINICAL INFORMATION: Status post ORIF of the right ankle. Right ankle   pain and swelling. Evaluate for hardware complication. Lateral wound.  TECHNIQUE: Axial CT images were obtained of the right ankle with coronal   and sagittal reconstructions. No contrast wasadministered. Three   dimensional reconstructions were obtained.    FINDINGS:    The patient is status post ORIF of the distal fibula with a lateral   sideplate and 5 interlocking screws. The hardware appears intact without   evidence of periprosthetic fracture or loosening.    There is circumferential subcutaneous soft tissue edema throughout the   visualized right lower extremity that is most prominent at the medial and   lateral aspects of the ankle. There is no obvious encapsulated fluid   collection. There is minimal overlying skin induration at the lateral   ankle without an obvious deep ulcer identified.    Although limited for evaluation, there is no CT evidence of   osteomyelitis. If this remains a clinical concern, bone scan or metal   reduction MRI can be obtained.    There is mild tibiotalar arthrosis. Chronic appearing osseous productive   change is seen at the median medial malleolus likely related to old   injury and enthesopathic change.    There is vascular calcification present.    IMPRESSION:    Status post ORIF of the distal fibula with the hardware appearing intact   without periprosthetic fracture or loosening.    Medial and lateral subcutaneous soft tissue edema at the ankle without an   obvious abscess or deep ulceration. Circumferential subcutaneous soft   tissue edema throughout the right lower extremity suggesting synovitis.    Although limited for evaluation, there is no CT evidence of   osteomyelitis. If this remains a clinical concern, bone scan or metal   reduction MRI can be obtained.      Advanced directives addressed: full resuscitation
89y Female, remote history of right ankle ORIF approximately 30 years ago presents with right lateral ankle wound. Patient has a history of frequent falls, although denies any recent ones, states she bumped the outside of her right ankle against her bed roughly 1 month ago, now with delayed wound healing, progressive ulceration. Patient states her wound initially began to heal, but has had intermittent wound breakdown over the interval 4 weeks, now significantly worse over the past 3 days. Denies any interval falls/trauma. Patient is ambulatory at baseline with assistive devices. No pain with ambulation. Denies numbness/tingling. Denies fevers/chills    PAST MEDICAL & SURGICAL HISTORY:  Hypertension    Benign essential tremor    Osteoarthritis    Glaucoma    Emphysema/COPD    H/O hyperglycemia    PVD (peripheral vascular disease)    Glaucoma filtering bleb of both eyes    Dislocated intraocular lens  right eye    Ankle injury      Home Medications:  Advair Diskus 250 mcg-50 mcg inhalation powder: 1 puff(s) inhaled 2 times a day (24 Dec 2019 21:07)  Albuterol inh solution: Inhale 1 vial via nebulizer once daily as needed  ***pt doesn&#x27;t know strength*** (25 Dec 2019 08:58)  hydroCHLOROthiazide 12.5 mg oral capsule: 1 cap(s) orally once a day (24 Dec 2019 21:07)  PreserVision AREDS 2 oral capsule: 1 cap(s) orally once a day (25 Dec 2019 08:58)  propranolol 60 mg oral capsule, extended release: 1 cap(s) orally once a day (24 Dec 2019 21:07)  Spiriva HandiHaler 18 mcg inhalation capsule: 1 cap(s) inhaled once a day (25 Dec 2019 08:58)  Vitamin D3 2000 intl units oral capsule: 2 cap(s) orally once a day (24 Dec 2019 21:07)    Allergies    sulfa drugs (Unknown)    Intolerances    codeine (Nausea)                            13.1   9.35  )-----------( 256      ( 13 Apr 2022 18:54 )             40.0     04-13    137  |  105  |  35<H>  ----------------------------<  84  4.0   |  25  |  1.40<H>    Ca    9.4      13 Apr 2022 18:54    TPro  7.7  /  Alb  3.6  /  TBili  0.4  /  DBili  x   /  AST  25  /  ALT  12  /  AlkPhos  63  04-13    PT/INR - ( 13 Apr 2022 18:54 )   PT: 11.4 sec;   INR: 0.98 ratio         PTT - ( 13 Apr 2022 18:54 )  PTT:30.4 sec      Vital Signs Last 24 Hrs  T(C): 36.4 (13 Apr 2022 18:20), Max: 36.4 (13 Apr 2022 18:20)  T(F): 97.5 (13 Apr 2022 18:20), Max: 97.5 (13 Apr 2022 18:20)  HR: 72 (13 Apr 2022 23:23) (51 - 72)  BP: 191/78 (13 Apr 2022 23:23) (142/73 - 191/78)  BP(mean): --  RR: 16 (13 Apr 2022 23:23) (16 - 16)  SpO2: 90% (13 Apr 2022 18:20) (90% - 90%)      PHYSICAL EXAM  GEN: NAD, Awake and Alert    RIGHT  Lower Extremity:   4x6 cm area of skin breakdown, stage II ulceration just proximal to the lateral malleolus  No exposed hardware  No expressible purulence, however grossly purulent and visible drainage on bandage   Oly-wound TTP  Full painless ankle ROM  No pain with micromotion  2+ edema about the foot   NTTP over the bony prominences of the hip/knee/foot/toes  SILT L2-S1  +EHL/FHL/GSC/TA  +DP pulses  Compartments soft and compressible  Significant calf TTP bilaterally       IMAGING  XR R Tib/Fib: Prior fibular plate, intact, no underlying obvious signs of OM.     Assessment/Plan:  89y Female with previous right ankle hardware, now with lateral ankle ulceration     - Large lateral ankle wound ulceration in the setting of previous right ankle ORIF, no radiographic concerns for underlying OM  - No current plan for surgical intervention, however if wound continues to progress/hardware becomes exposed, may require serial bedside I&D's versus formal irrigation and debridement with removal of hardware.   - Gentle irrigation to be performed at bedside, wet-dry dressing   - Medical admission for close clinical monitoring, daily wound inspections with dressing changes  - Consider advanced imaging for further evaluation, CT w/wout contrast preferred intially  - Significant calf tenderness to palpation, consider LE dopplers   - ESR/CRP   - Blood cultures x2  - Wound care eval  - Nutrition consult for optimized wound healing   - ID recs appreciated, on broad spectrum abx per ED, continue   - Medical mgmt per primary team   - Will discuss and review imaging with attending. Update plan as needed

## 2022-04-14 NOTE — DIETITIAN INITIAL EVALUATION ADULT - ORAL INTAKE PTA/DIET HISTORY
States she eats well but does not have a "big appetite"  Lives alone, cooks and shops for self. Has home aide 2-3 days/ week

## 2022-04-14 NOTE — DIETITIAN INITIAL EVALUATION ADULT - PERTINENT MEDS FT
MEDICATIONS  (STANDING):  budesonide 160 MICROgram(s)/formoterol 4.5 MICROgram(s) Inhaler 2 Puff(s) Inhalation two times a day  calcium carbonate 1250 mG  + Vitamin D (OsCal 500 + D) 1 Tablet(s) Oral daily  cefepime  Injectable. 1000 milliGRAM(s) IV Push daily  cholecalciferol 4000 Unit(s) Oral daily  doxycycline hyclate Capsule 100 milliGRAM(s) Oral every 12 hours  heparin   Injectable 5000 Unit(s) SubCutaneous every 12 hours  propranolol 60 milliGRAM(s) Oral daily  tiotropium 18 MICROgram(s) Capsule 1 Capsule(s) Inhalation daily    MEDICATIONS  (PRN):  acetaminophen     Tablet .. 650 milliGRAM(s) Oral every 6 hours PRN Mild Pain (1 - 3)  ALBUTerol    90 MICROgram(s) HFA Inhaler 2 Puff(s) Inhalation every 6 hours PRN Bronchospasm  artificial  tears Solution 1 Drop(s) Both EYES four times a day PRN Dry Eyes

## 2022-04-14 NOTE — PHARMACOTHERAPY INTERVENTION NOTE - COMMENTS
Medication reconciliation completed.  Reviewed Medication list and confirmed med allergies with patient; confirmed with Dr. First Meddevaughn.

## 2022-04-14 NOTE — ADVANCED PRACTICE NURSE CONSULT - RECOMMEDATIONS
1)Continue to Elevate heels off of Mattress  2)Continue to Turn and position every 2 Hours  3)Consult Dietitian   4) Apply 1/4 Strength Dakin's to wound bed cover with gauze and wrap with kerlix twice per day.   5) Apply Dimethicone (Sween) to bilateral LE daily.

## 2022-04-14 NOTE — DIETITIAN INITIAL EVALUATION ADULT - PERTINENT LABORATORY DATA
04-14    140  |  107  |  31<H>  ----------------------------<  94  3.7   |  28  |  1.20    Ca    9.5      14 Apr 2022 08:20    TPro  7.1  /  Alb  3.3  /  TBili  0.5  /  DBili  x   /  AST  26  /  ALT  10<L>  /  AlkPhos  59  04-14  A1C with Estimated Average Glucose Result: 6.1 % (04-14-22 @ 08:20)

## 2022-04-14 NOTE — PATIENT PROFILE ADULT - FALL HARM RISK - HARM RISK INTERVENTIONS

## 2022-04-14 NOTE — DIETITIAN NUTRITION RISK NOTIFICATION - TREATMENT: THE FOLLOWING DIET HAS BEEN RECOMMENDED
Diet, Regular:   DASH/TLC {Sodium & Cholesterol Restricted} (DASH) (04-14-22 @ 05:34) [Active]

## 2022-04-14 NOTE — H&P ADULT - ASSESSMENT
89 y/o F w/ PMH of HTN, essential tremor, OA, glaucoma, COPD, PVD, glaucoma, p/w RLE wound    *R Ankle Wound  -Vanco / Zosyn  -F/u blood cx  -ESR / CRP  -Blood cultures   -Wound consult   -ID consult  -F/u ortho     *KENTON vs CKD?  -Baseline creatinine unknown  -Will hold diuretics  -Avoid nephrotoxic agents  -Encourage oral hydration and recheck creatinine in AM   -UA     *HTN / OA / glaucoma / COPD / PVD / glaucoma  -C/w home meds and f/u outpatient for further management if conditions remain stable during hospitalization     *DVT ppx   -Heparin SubQ   89 y/o F w/ PMH of HTN, essential tremor, OA, glaucoma, COPD, PVD, glaucoma, p/w RLE wound    *R Ankle Wound  -Vanco / cefepime  -F/u blood cx  -ESR / CRP  -Blood cultures   -Wound consult   -ID consult  -F/u ortho     *KENTON vs CKD?  -Baseline creatinine unknown  -Will hold diuretics  -Avoid nephrotoxic agents  -Encourage oral hydration and recheck creatinine in AM   -UA     *HTN / OA / glaucoma / COPD / PVD / glaucoma  -C/w home meds (Except as noted above) and f/u outpatient for further management if conditions remain stable during hospitalization     *DVT ppx   -Heparin SubQ   89 y/o F w/ PMH of HTN, essential tremor, OA, glaucoma, COPD, PVD, glaucoma, p/w RLE wound    *R Ankle Wound  -Vanco / Zosyn  -F/u blood cx  -ESR / CRP  -Blood cultures   -Wound consult   -ID consult  -F/u ortho     *KENTON vs CKD?  -Baseline creatinine unknown  -Will hold diuretics  -Avoid nephrotoxic agents  -Encourage oral hydration and recheck creatinine in AM   -UA     *HTN / OA / glaucoma / COPD / PVD / glaucoma  -C/w home meds (Except as noted above) and f/u outpatient for further management if conditions remain stable during hospitalization     *DVT ppx   -Heparin SubQ    IMPROVE VTE Individual Risk Assessment    RISK                                                                Points    [  ] Previous VTE                                                  3    [  ] Thrombophilia                                               2    [  ] Lower limb paralysis                                      2        (unable to hold up >15 seconds)      [  ] Current Cancer                                              2         (within 6 months)    [ 1 ] Immobilization > 24 hrs                                1    [  ] ICU/CCU stay > 24 hours                              1    [1  ] Age > 60                                                      1    IMPROVE VTE Score _____2____    IMPROVE Score 0-1: Low Risk, No VTE prophylaxis required for most patients, encourage ambulation.   IMPROVE Score 2-3: At risk, pharmacologic VTE prophylaxis is indicated for most patients (in the absence of a contraindication)  IMPROVE Score > or = 4: High Risk, pharmacologic VTE prophylaxis is indicated for most patients (in the absence of a contraindication)

## 2022-04-14 NOTE — ED ADULT NURSE REASSESSMENT NOTE - NS ED NURSE REASSESS COMMENT FT1
Spoke with Hospitalist team concerning pts blood pressure.   Per provider repeat blood pressure and if pressure remains high   contact admit provider.
received report and transfer of care from Lonny DOMINGO
provider made aware of patients elevated blood pressure

## 2022-04-14 NOTE — H&P ADULT - HISTORY OF PRESENT ILLNESS
89 y/o F w/ PMH of HTN, essential tremor, OA, glaucoma, COPD, PVD, glaucoma, p/w RLE wound. Patient states her RLE wound has been worsening for the last 3 days. C/o pain at the site. At baseline patient lives at home and walks with assistive device. Denies fever, chills, cough, runny nose, sore throat, nausea, vomiting, abdominal pain      PSH: ORIF     Social Hx: Denies x 3    Family Hx: Denies

## 2022-04-14 NOTE — CONSULT NOTE ADULT - ASSESSMENT
89 y/o F w/ PMH of HTN, essential tremor, OA, glaucoma, COPD, PVD, glaucoma, p/w RLE wound. Patient states her RLE wound has been worsening for the last 3 days. C/o pain at the site. At baseline patient lives at home and walks with assistive device. Denies fever, chills, cough, runny nose, sore throat, nausea, vomiting, abdominal pain. Had CT RLE and was given vancomycin/cefepime.      1. R ankle wound. superimposed cellulitis. s/p RLE ORIF. PVD. chronic LE stasis dermatitis  - imaging reviewed  - ortho follow up   - wound care  - change vancomycin to doxycycline 100mg BID  - agree with cefepime adjust dose to 1gm daily  - f/u cultures  - monitor temps  - tolerating antibiotics without rashes or side effects  - reason for abx use and side effects reviewed with patient  - supportive care  - fu cbc    2. other issues - care per medicine

## 2022-04-14 NOTE — PHYSICAL THERAPY INITIAL EVALUATION ADULT - MODALITIES TREATMENT COMMENTS
Patient was able to tolerate ambulation with contact guard without assistive device without limitation. Patient tolerated treatment session well with review of Therapeutic exercises. Pt in NAD, left sitting upright in chair with chair alarm on, CBIR, Nurse Notified.

## 2022-04-14 NOTE — PHYSICAL THERAPY INITIAL EVALUATION ADULT - ADDITIONAL COMMENTS
Patient has 3 steps to enter with bilateral handrails. Patient has Home Care Assistance a few hours per week. 1 flight of stairs to cellar, which she does not access on her own. Uses a Rollator when ambulating in the community. Uses AD in home as needed.

## 2022-04-14 NOTE — ADVANCED PRACTICE NURSE CONSULT - ASSESSMENT
This is a 89 y/o F w/ PMH of HTN, essential tremor, OA, glaucoma, COPD, PVD, glaucoma, p/w RLE wound. Patient states her RLE wound has been worsening for the last 3 days. C/o pain at the site. At baseline patient lives at home and walks with assistive device.   Assessed patient on 5 East and Right LE Superior to Lateral Malleolus with a 3.4cm 2.3cmx 0.1cm wound with 100% yellow firmly attached slough and erythema 1cm encircling the wound and painful when palpating. Will be applying 1/4 strength Dakins soaked gauze to wound bed and wrap with dry Kerlix two times per day. Will continue to monitor. The bilateral LE with Hemosiderin staining  and dry skin noted. Will apply Dimethicone lotion to LE daily to combat dryness.    Patient is able to position self and make needs know

## 2022-04-14 NOTE — DIETITIAN INITIAL EVALUATION ADULT - ADD RECOMMEND
1) Liberalize diet to regular to maximize caloric and nutrient intake, 2) Encourage protein-rich foods, maximize food preferences, 3) encourage energy-dense foods, 4) MVI w/ minerals daily to ensure 100% RDA met, 5) Consider adding thiamine 100 mg daily 2/2 poor PO intake/ malnutrition, 6) Monitor bowel movements, if no BM for >3 days, consider implementing bowel regimen, 7) would confirm goals of care regarding nutrition support - Nutrition support is not recommended due to overall declining medical status which evidenced based studies indicate EN is not effective in prolonging survival and improving quality of life. It can also increase risk of aspiration pneumonia as well as other related issues. RD will continue to monitor PO intake, labs, hydration, and wt prn.

## 2022-04-14 NOTE — PROGRESS NOTE ADULT - SUBJECTIVE AND OBJECTIVE BOX
HOSPITALIST ATTENDING PROGRESS NOTE    Chart and meds reviewed.  Patient seen and examined.    CC: Cellulitis    Subjective: No acute complaints today. No pain or fever. Ambulating well. Tolerating po.    Review of  systems reviewed and found to be negative with the exception of what has been described above.    MEDICATIONS  (STANDING):  budesonide 160 MICROgram(s)/formoterol 4.5 MICROgram(s) Inhaler 2 Puff(s) Inhalation two times a day  calcium carbonate 1250 mG  + Vitamin D (OsCal 500 + D) 1 Tablet(s) Oral daily  cefepime  Injectable. 1000 milliGRAM(s) IV Push daily  cholecalciferol 4000 Unit(s) Oral daily  doxycycline hyclate Capsule 100 milliGRAM(s) Oral every 12 hours  heparin   Injectable 5000 Unit(s) SubCutaneous every 12 hours  propranolol 60 milliGRAM(s) Oral daily  tiotropium 18 MICROgram(s) Capsule 1 Capsule(s) Inhalation daily    MEDICATIONS  (PRN):  acetaminophen     Tablet .. 650 milliGRAM(s) Oral every 6 hours PRN Mild Pain (1 - 3)  ALBUTerol    90 MICROgram(s) HFA Inhaler 2 Puff(s) Inhalation every 6 hours PRN Bronchospasm  artificial  tears Solution 1 Drop(s) Both EYES four times a day PRN Dry Eyes      VITALS:  T(F): 98 (22 @ 07:16), Max: 98.2 (22 @ 03:35)  HR: 66 (22 @ 07:16) (51 - 84)  BP: 146/47 (22 @ 07:16) (135/58 - 191/78)  RR: 17 (22 @ 07:16) (16 - 17)  SpO2: 93% (22 @ 07:16) (90% - 99%)        PHYSICAL EXAM:  GEN: NAD  HEENT:  pupils equal and reactive, EOMI, no oropharyngeal lesions, erythema, exudates, oral thrush  NECK:   supple, no carotid bruits, no palpable lymph nodes, no thyromegaly  CV:  +S1, +S2, regular, + murmurs or rubs  RESP:   lungs clear to auscultation bilaterally, no wheezing, rales, rhonchi, good air entry bilaterally  BREAST:  not examined  GI:  abdomen soft, non-tender, non-distended, normal BS, no bruits, no abdominal masses, no palpable masses  RECTAL:  not examined  :  not examined  MSK:   normal muscle tone, no atrophy, no rigidity, no contractions  EXT:  no clubbing, no cyanosis, no edema, no calf pain, swelling or erythema  VASCULAR:  pulses equal and symmetric in the upper and lower extremities  NEURO:  AAOX3, no focal neurological deficits, follows all commands, able to move extremities spontaneously  SKIN:  no ulcers, lesions or rashes    LABS:                            12.6   10.67 )-----------( 258      ( 2022 08:20 )             38.2         140  |  107  |  31<H>  ----------------------------<  94  3.7   |  28  |  1.20    Ca    9.5      2022 08:20    TPro  7.1  /  Alb  3.3  /  TBili  0.5  /  DBili  x   /  AST  26  /  ALT  10<L>  /  AlkPhos  59          LIVER FUNCTIONS - ( 2022 08:20 )  Alb: 3.3 g/dL / Pro: 7.1 gm/dL / ALK PHOS: 59 U/L / ALT: 10 U/L / AST: 26 U/L / GGT: x           PT/INR - ( 2022 08:20 )   PT: 12.1 sec;   INR: 1.04 ratio    PTT - ( 2022 08:20 )  PTT:30.2 sec    Urinalysis Basic - ( 2022 23:44 )  Color: Yellow / Appearance: Clear / S.010 / pH: x  Gluc: x / Ketone: Negative  / Bili: Negative / Urobili: Negative   Blood: x / Protein: Negative / Nitrite: Negative   Leuk Esterase: Negative / RBC: x / WBC x   Sq Epi: x / Non Sq Epi: x / Bacteria: x        Lactate, Blood: 1.1 mmol/L ( @ 18:54)         US Duplex Venous Lower Ext Complete, Bilateral (22 @ 08:02) >  IMPRESSION:  No evidence of deep venous thrombosis in either lower extremity.     CT Ankle No Cont, Right (22 @ 02:18) >  IMPRESSION:    Status post ORIF of the distal fibula with the hardware appearing intact   without periprosthetic fracture or loosening.    Medial and lateral subcutaneous soft tissue edema at the ankle without an   obvious abscess or deep ulceration. Circumferential subcutaneous soft   tissue edema throughout the right lower extremity suggesting synovitis.    Although limited for evaluation, there is no CT evidence of   osteomyelitis. If this remains a clinical concern, bone scan or metal   reduction MRI can be obtained.       HOSPITALIST ATTENDING PROGRESS NOTE    Chart and meds reviewed.  Patient seen and examined.    CC: Cellulitis    Subjective: No acute complaints today. No pain or fever. Ambulating well. Tolerating po.    Review of  systems reviewed and found to be negative with the exception of what has been described above.    MEDICATIONS  (STANDING):  budesonide 160 MICROgram(s)/formoterol 4.5 MICROgram(s) Inhaler 2 Puff(s) Inhalation two times a day  calcium carbonate 1250 mG  + Vitamin D (OsCal 500 + D) 1 Tablet(s) Oral daily  cefepime  Injectable. 1000 milliGRAM(s) IV Push daily  cholecalciferol 4000 Unit(s) Oral daily  doxycycline hyclate Capsule 100 milliGRAM(s) Oral every 12 hours  heparin   Injectable 5000 Unit(s) SubCutaneous every 12 hours  propranolol 60 milliGRAM(s) Oral daily  tiotropium 18 MICROgram(s) Capsule 1 Capsule(s) Inhalation daily    MEDICATIONS  (PRN):  acetaminophen     Tablet .. 650 milliGRAM(s) Oral every 6 hours PRN Mild Pain (1 - 3)  ALBUTerol    90 MICROgram(s) HFA Inhaler 2 Puff(s) Inhalation every 6 hours PRN Bronchospasm  artificial  tears Solution 1 Drop(s) Both EYES four times a day PRN Dry Eyes      VITALS:  T(F): 98 (22 @ 07:16), Max: 98.2 (22 @ 03:35)  HR: 66 (22 @ 07:16) (51 - 84)  BP: 146/47 (22 @ 07:16) (135/58 - 191/78)  RR: 17 (22 @ 07:16) (16 - 17)  SpO2: 93% (22 @ 07:16) (90% - 99%)        PHYSICAL EXAM:  GEN: NAD  HEENT:  pupils equal and reactive, EOMI, no oropharyngeal lesions, erythema, exudates, oral thrush  NECK:   supple, no carotid bruits, no palpable lymph nodes, no thyromegaly  CV:  +S1, +S2, regular, + murmurs or rubs  RESP:   lungs clear to auscultation bilaterally, no wheezing, rales, rhonchi, good air entry bilaterally  BREAST:  not examined  GI:  abdomen soft, non-tender, non-distended, normal BS, no bruits, no abdominal masses, no palpable masses  RECTAL:  not examined  :  not examined  MSK:   normal muscle tone, no atrophy, no rigidity, no contractions  EXT:  no clubbing, no cyanosis, no edema, no calf pain, swelling or erythema  VASCULAR:  pulses equal and symmetric in the upper and lower extremities  NEURO:  AAOX3, no focal neurological deficits, follows all commands, able to move extremities spontaneously  SKIN: Right ankle wound with surrounding redness    LABS:                            12.6   10.67 )-----------( 258      ( 2022 08:20 )             38.2         140  |  107  |  31<H>  ----------------------------<  94  3.7   |  28  |  1.20    Ca    9.5      2022 08:20    TPro  7.1  /  Alb  3.3  /  TBili  0.5  /  DBili  x   /  AST  26  /  ALT  10<L>  /  AlkPhos  59          LIVER FUNCTIONS - ( 2022 08:20 )  Alb: 3.3 g/dL / Pro: 7.1 gm/dL / ALK PHOS: 59 U/L / ALT: 10 U/L / AST: 26 U/L / GGT: x           PT/INR - ( 2022 08:20 )   PT: 12.1 sec;   INR: 1.04 ratio    PTT - ( 2022 08:20 )  PTT:30.2 sec    Urinalysis Basic - ( 2022 23:44 )  Color: Yellow / Appearance: Clear / S.010 / pH: x  Gluc: x / Ketone: Negative  / Bili: Negative / Urobili: Negative   Blood: x / Protein: Negative / Nitrite: Negative   Leuk Esterase: Negative / RBC: x / WBC x   Sq Epi: x / Non Sq Epi: x / Bacteria: x        Lactate, Blood: 1.1 mmol/L ( @ 18:54)         US Duplex Venous Lower Ext Complete, Bilateral (22 @ 08:02) >  IMPRESSION:  No evidence of deep venous thrombosis in either lower extremity.     CT Ankle No Cont, Right (22 @ 02:18) >  IMPRESSION:    Status post ORIF of the distal fibula with the hardware appearing intact   without periprosthetic fracture or loosening.    Medial and lateral subcutaneous soft tissue edema at the ankle without an   obvious abscess or deep ulceration. Circumferential subcutaneous soft   tissue edema throughout the right lower extremity suggesting synovitis.    Although limited for evaluation, there is no CT evidence of   osteomyelitis. If this remains a clinical concern, bone scan or metal   reduction MRI can be obtained.

## 2022-04-15 LAB
ANION GAP SERPL CALC-SCNC: 6 MMOL/L — SIGNIFICANT CHANGE UP (ref 5–17)
BUN SERPL-MCNC: 34 MG/DL — HIGH (ref 7–23)
CALCIUM SERPL-MCNC: 9.9 MG/DL — SIGNIFICANT CHANGE UP (ref 8.5–10.1)
CHLORIDE SERPL-SCNC: 106 MMOL/L — SIGNIFICANT CHANGE UP (ref 96–108)
CO2 SERPL-SCNC: 31 MMOL/L — SIGNIFICANT CHANGE UP (ref 22–31)
CREAT SERPL-MCNC: 1.46 MG/DL — HIGH (ref 0.5–1.3)
EGFR: 32 ML/MIN/1.73M2 — LOW
GLUCOSE SERPL-MCNC: 186 MG/DL — HIGH (ref 70–99)
HCT VFR BLD CALC: 36.4 % — SIGNIFICANT CHANGE UP (ref 34.5–45)
HGB BLD-MCNC: 11.8 G/DL — SIGNIFICANT CHANGE UP (ref 11.5–15.5)
MCHC RBC-ENTMCNC: 30.3 PG — SIGNIFICANT CHANGE UP (ref 27–34)
MCHC RBC-ENTMCNC: 32.4 GM/DL — SIGNIFICANT CHANGE UP (ref 32–36)
MCV RBC AUTO: 93.3 FL — SIGNIFICANT CHANGE UP (ref 80–100)
PLATELET # BLD AUTO: 250 K/UL — SIGNIFICANT CHANGE UP (ref 150–400)
POTASSIUM SERPL-MCNC: 3.6 MMOL/L — SIGNIFICANT CHANGE UP (ref 3.5–5.3)
POTASSIUM SERPL-SCNC: 3.6 MMOL/L — SIGNIFICANT CHANGE UP (ref 3.5–5.3)
RBC # BLD: 3.9 M/UL — SIGNIFICANT CHANGE UP (ref 3.8–5.2)
RBC # FLD: 14.6 % — HIGH (ref 10.3–14.5)
SODIUM SERPL-SCNC: 143 MMOL/L — SIGNIFICANT CHANGE UP (ref 135–145)
WBC # BLD: 8.64 K/UL — SIGNIFICANT CHANGE UP (ref 3.8–10.5)
WBC # FLD AUTO: 8.64 K/UL — SIGNIFICANT CHANGE UP (ref 3.8–10.5)

## 2022-04-15 PROCEDURE — 99232 SBSQ HOSP IP/OBS MODERATE 35: CPT

## 2022-04-15 RX ADMIN — Medication 1 APPLICATION(S): at 21:07

## 2022-04-15 RX ADMIN — HEPARIN SODIUM 5000 UNIT(S): 5000 INJECTION INTRAVENOUS; SUBCUTANEOUS at 21:07

## 2022-04-15 RX ADMIN — TIOTROPIUM BROMIDE 1 CAPSULE(S): 18 CAPSULE ORAL; RESPIRATORY (INHALATION) at 09:37

## 2022-04-15 RX ADMIN — Medication 1 APPLICATION(S): at 10:40

## 2022-04-15 RX ADMIN — Medication 1 TABLET(S): at 10:38

## 2022-04-15 RX ADMIN — Medication 100 MILLIGRAM(S): at 21:07

## 2022-04-15 RX ADMIN — CEFEPIME 1000 MILLIGRAM(S): 1 INJECTION, POWDER, FOR SOLUTION INTRAMUSCULAR; INTRAVENOUS at 10:40

## 2022-04-15 RX ADMIN — BUDESONIDE AND FORMOTEROL FUMARATE DIHYDRATE 2 PUFF(S): 160; 4.5 AEROSOL RESPIRATORY (INHALATION) at 21:08

## 2022-04-15 RX ADMIN — BUDESONIDE AND FORMOTEROL FUMARATE DIHYDRATE 2 PUFF(S): 160; 4.5 AEROSOL RESPIRATORY (INHALATION) at 09:39

## 2022-04-15 RX ADMIN — Medication 100 MILLIGRAM(S): at 10:38

## 2022-04-15 RX ADMIN — HEPARIN SODIUM 5000 UNIT(S): 5000 INJECTION INTRAVENOUS; SUBCUTANEOUS at 10:38

## 2022-04-15 RX ADMIN — Medication 4000 UNIT(S): at 10:38

## 2022-04-15 NOTE — PROGRESS NOTE ADULT - SUBJECTIVE AND OBJECTIVE BOX
HOSPITALIST ATTENDING PROGRESS NOTE    Chart and meds reviewed.  Patient seen and examined.    CC: Cellulitis    Subjective: No acute complaints today. No pain or fever. Ambulating well. Tolerating po.  04/15/22: Patient seen and examined. Walked with PT in the hallway. No new issues. Discussed with patient regarding management and d/c plan.     Review of  systems reviewed and found to be negative with the exception of what has been described above.      Vital Signs Last 24 Hrs  T(C): 36.8 (15 Apr 2022 09:38), Max: 36.8 (15 Apr 2022 09:38)  T(F): 98.2 (15 Apr 2022 09:38), Max: 98.2 (15 Apr 2022 09:38)  HR: 76 (15 Apr 2022 09:38) (64 - 76)  BP: 108/67 (15 Apr 2022 09:38) (108/67 - 155/58)  BP(mean): --  RR: 18 (15 Apr 2022 09:38) (18 - 18)  SpO2: 98% (15 Apr 2022 09:38) (92% - 98%)        PHYSICAL EXAM:    GEN: NAD  HEENT:  pupils equal and reactive, EOMI, no oropharyngeal lesions, erythema, exudates, oral thrush  NECK:   supple, no carotid bruits, no palpable lymph nodes, no thyromegaly  CV:  +S1, +S2, regular, + murmurs or rubs  RESP:   lungs clear to auscultation bilaterally, no wheezing, rales, rhonchi, good air entry bilaterally  BREAST:  not examined  GI:  abdomen soft, non-tender, non-distended, normal BS, no bruits, no abdominal masses, no palpable masses  RECTAL:  not examined  :  not examined  MSK:   normal muscle tone, no atrophy, no rigidity, no contractions  EXT:  no clubbing, no cyanosis, no edema, no calf pain, swelling or erythema  VASCULAR:  pulses equal and symmetric in the upper and lower extremities  NEURO:  AAOX3, no focal neurological deficits, follows all commands, able to move extremities spontaneously  SKIN: Right ankle wound with surrounding redness        LABS:                            11.8   8.64  )-----------( 250      ( 15 Apr 2022 09:45 )             36.4     15 Apr 2022 09:45    143    |  106    |  34     ----------------------------<  186    3.6     |  31     |  1.46     Ca    9.9        15 Apr 2022 09:45    TPro  7.1    /  Alb  3.3    /  TBili  0.5    /  DBili  x      /  AST  26     /  ALT  10     /  AlkPhos  59     2022 08:20    LIVER FUNCTIONS - ( 2022 08:20 )  Alb: 3.3 g/dL / Pro: 7.1 gm/dL / ALK PHOS: 59 U/L / ALT: 10 U/L / AST: 26 U/L / GGT: x           PT/INR - ( 2022 08:20 )   PT: 12.1 sec;   INR: 1.04 ratio         PTT - ( 2022 08:20 )  PTT:30.2 sec  CAPILLARY BLOOD GLUCOSE            Urinalysis Basic - ( 2022 23:44 )    Color: Yellow / Appearance: Clear / S.010 / pH: x  Gluc: x / Ketone: Negative  / Bili: Negative / Urobili: Negative   Blood: x / Protein: Negative / Nitrite: Negative   Leuk Esterase: Negative / RBC: x / WBC x   Sq Epi: x / Non Sq Epi: x / Bacteria: x

## 2022-04-15 NOTE — PROGRESS NOTE ADULT - SUBJECTIVE AND OBJECTIVE BOX
Date of service: 04-15-22 @ 11:53    pt seen and examined  has some R ankle discomfort  afebrile  laying in bed, nad     ROS: no fever or chills; denies dizziness, no HA, no SOB or cough, no abdominal pain, no diarrhea or constipation; no dysuria, no urinary frequency, no legs pain, no rashes    MEDICATIONS  (STANDING):  budesonide 160 MICROgram(s)/formoterol 4.5 MICROgram(s) Inhaler 2 Puff(s) Inhalation two times a day  calcium carbonate 1250 mG  + Vitamin D (OsCal 500 + D) 1 Tablet(s) Oral daily  cefepime  Injectable. 1000 milliGRAM(s) IV Push daily  cholecalciferol 4000 Unit(s) Oral daily  Dakins Solution - 1/4 Strength 1 Application(s) Topical two times a day  doxycycline hyclate Capsule 100 milliGRAM(s) Oral every 12 hours  heparin   Injectable 5000 Unit(s) SubCutaneous every 12 hours  propranolol 60 milliGRAM(s) Oral daily  tiotropium 18 MICROgram(s) Capsule 1 Capsule(s) Inhalation daily    Vital Signs Last 24 Hrs  T(C): 36.8 (15 Apr 2022 09:38), Max: 36.8 (15 Apr 2022 09:38)  T(F): 98.2 (15 Apr 2022 09:38), Max: 98.2 (15 Apr 2022 09:38)  HR: 76 (15 Apr 2022 09:38) (64 - 76)  BP: 108/67 (15 Apr 2022 09:38) (108/67 - 155/58)  BP(mean): --  RR: 18 (15 Apr 2022 09:38) (18 - 18)  SpO2: 98% (15 Apr 2022 09:38) (92% - 98%)        PE:  Constitutional: NAD   HEENT: NC/AT, EOMI, PERRLA, conjunctivae clear; ears and nose atraumatic; pharynx benign  Neck: supple; thyroid not palpable  Back: no tenderness  Respiratory: respiratory effort normal; clear to auscultation  Cardiovascular: S1S2 regular, no murmurs  Abdomen: soft, not tender, not distended, positive BS; liver and spleen WNL  Genitourinary: no suprapubic tenderness  Lymphatic: no LN palpable  Musculoskeletal: no muscle tenderness, no joint swelling or tenderness  Extremities: no pedal edema R ankle wound with slough, erythema, warmth   Neurological/ Psychiatric: AxOx3, Judgement and insight normal;  moving all extremities  Skin: no rashes; no palpable lesions    Labs: all available labs reviewed                                   12.6   10.67 )-----------( 258      ( 2022 08:20 )             38.2     04-15    143  |  106  |  34<H>  ----------------------------<  186<H>  3.6   |  31  |  1.46<H>    Ca    9.9      15 Apr 2022 09:45    TPro  7.1  /  Alb  3.3  /  TBili  0.5  /  DBili  x   /  AST  26  /  ALT  10<L>  /  AlkPhos  59        LIVER FUNCTIONS - ( 2022 08:20 )  Alb: 3.3 g/dL / Pro: 7.1 gm/dL / ALK PHOS: 59 U/L / ALT: 10 U/L / AST: 26 U/L / GGT: x           Urinalysis Basic - ( 2022 23:44 )    Color: Yellow / Appearance: Clear / S.010 / pH: x  Gluc: x / Ketone: Negative  / Bili: Negative / Urobili: Negative   Blood: x / Protein: Negative / Nitrite: Negative   Leuk Esterase: Negative / RBC: x / WBC x   Sq Epi: x / Non Sq Epi: x / Bacteria: x      Culture - Blood (22 @ 18:54)   Specimen Source: .Blood Blood-Peripheral   Culture Results:   No growth to date. Culture - Blood (22 @ 18:54)   Specimen Source: .Blood Blood-Peripheral   Culture Results:   No growth to date.     Radiology: all available radiological tests reviewed  < from: CT Ankle No Cont, Right (22 @ 02:18) >  ACC: [id number] EXAM:  CT 3D RECONSTRUCT MATILDE SAAB                        ACC: [id number] EXAM:  CT ANKLE ONLY RT                          PROCEDURE DATE:  2022          INTERPRETATION:  CT OF THE RIGHT ANKLE WITHOUT CONTRAST.    CLINICAL INFORMATION: Status post ORIF of the right ankle. Right ankle   pain and swelling. Evaluate for hardware complication. Lateral wound.  TECHNIQUE: Axial CT images were obtained of the right ankle with coronal   and sagittal reconstructions. No contrast wasadministered. Three   dimensional reconstructions were obtained.    FINDINGS:    The patient is status post ORIF of the distal fibula with a lateral   sideplate and 5 interlocking screws. The hardware appears intact without   evidence of periprosthetic fracture or loosening.    There is circumferential subcutaneous soft tissue edema throughout the   visualized right lower extremity that is most prominent at the medial and   lateral aspects of the ankle. There is no obvious encapsulated fluid   collection. There is minimal overlying skin induration at the lateral   ankle without an obvious deep ulcer identified.    Although limited for evaluation, there is no CT evidence of   osteomyelitis. If this remains a clinical concern, bone scan or metal   reduction MRI can be obtained.    There is mild tibiotalar arthrosis. Chronic appearing osseous productive   change is seen at the median medial malleolus likely related to old   injury and enthesopathic change.    There is vascular calcification present.    IMPRESSION:    Status post ORIF of the distal fibula with the hardware appearing intact   without periprosthetic fracture or loosening.    Medial and lateral subcutaneous soft tissue edema at the ankle without an   obvious abscess or deep ulceration. Circumferential subcutaneous soft   tissue edema throughout the right lower extremity suggesting synovitis.    Although limited for evaluation, there is no CT evidence of   osteomyelitis. If this remains a clinical concern, bone scan or metal   reduction MRI can be obtained.      Advanced directives addressed: full resuscitation

## 2022-04-15 NOTE — PROGRESS NOTE ADULT - SUBJECTIVE AND OBJECTIVE BOX
Seen and examined at bedside, no acute complaints, no fever chills, reports seen by woundcare team today who placed new dressing       PHYSICAL EXAM  GEN: NAD, Awake and Alert    RIGHT  Lower Extremity:   Soft dry dressing removed and replaced  4x6 cm area of skin breakdown, stage II ulceration just proximal to the lateral malleolus  No exposed hardware  No expressible purulence, however grossly purulent and visible drainage on bandage   Oly-wound TTP  Full painless ankle ROM  No pain with micromotion  2+ edema about the foot   NTTP over the bony prominences of the hip/knee/foot/toes  SILT L2-S1  +EHL/FHL/GSC/TA  +DP pulses  Compartments soft and compressible  Significant calf TTP bilaterally         Assessment/Plan:  89y Female with previous right ankle hardware, now with lateral ankle ulceration     - Large lateral ankle wound ulceration in the setting of previous right ankle ORIF, no radiographic concerns for underlying OM  - No current plan for surgical intervention, however if wound continues to progress/hardware becomes exposed, may require serial bedside I&D's versus formal irrigation and debridement with removal of hardware.   - Gentle irrigation to be performed at bedside, wet-dry dressing   - Medical admission for close clinical monitoring, daily wound inspections with dressing changes  - Consider advanced imaging for further evaluation, CT w/wout contrast preferred intially  - Significant calf tenderness to palpation, consider LE dopplers   - ESR/CRP   - Blood cultures x2  - Wound care eval --> daikens solution dressing, likely colagenase dressing today 4/15  - Nutrition consult for optimized wound healing   - ID recs appreciated, on broad spectrum abx per ED, continue   - Medical mgmt per primary team   - Will discuss and review imaging with attending. Update plan as needed  - Ortho stable Seen and examined at bedside, no acute complaints, no fever chills, reports seen by woundcare team today who placed new dressing       PHYSICAL EXAM  GEN: NAD, Awake and Alert    RIGHT  Lower Extremity:   Soft dry dressing removed and replaced  4x6 cm area of skin breakdown, stage II ulceration just proximal to the lateral malleolus  No exposed hardware  No expressible purulence, however grossly purulent and visible drainage on bandage   Oly-wound TTP  Full painless ankle ROM  No pain with micromotion  2+ edema about the foot   NTTP over the bony prominences of the hip/knee/foot/toes  SILT L2-S1  +EHL/FHL/GSC/TA  +DP pulses  Compartments soft and compressible  Significant calf TTP bilaterally         Assessment/Plan:  89y Female with previous right ankle hardware, now with lateral ankle ulceration     - Large lateral ankle wound ulceration in the setting of previous right ankle ORIF, no radiographic concerns for underlying OM  - No current plan for surgical intervention, however if wound continues to progress/hardware becomes exposed, may require serial bedside I&D's versus formal irrigation and debridement with removal of hardware.   - Gentle irrigation to be performed at bedside, wet-dry dressing   - Medical admission for close clinical monitoring, daily wound inspections with dressing changes  - Consider advanced imaging for further evaluation, CT w/wout contrast preferred intially  - Significant calf tenderness to palpation, consider LE dopplers   - ESR/CRP   - Blood cultures x2  - Wound care eval --> daikens solution dressing, likely colagenase dressing today 4/15  - Nutrition consult for optimized wound healing   - ID recs appreciated, on broad spectrum abx per ED, continue   - Medical mgmt per primary team   - Will discuss and review imaging with attending. Update plan as needed  - Ortho stable  - Please call for re-eval if any issues

## 2022-04-16 LAB
ANION GAP SERPL CALC-SCNC: 8 MMOL/L — SIGNIFICANT CHANGE UP (ref 5–17)
BUN SERPL-MCNC: 30 MG/DL — HIGH (ref 7–23)
CALCIUM SERPL-MCNC: 10.2 MG/DL — HIGH (ref 8.5–10.1)
CHLORIDE SERPL-SCNC: 104 MMOL/L — SIGNIFICANT CHANGE UP (ref 96–108)
CO2 SERPL-SCNC: 28 MMOL/L — SIGNIFICANT CHANGE UP (ref 22–31)
CREAT SERPL-MCNC: 1.3 MG/DL — SIGNIFICANT CHANGE UP (ref 0.5–1.3)
EGFR: 37 ML/MIN/1.73M2 — LOW
GLUCOSE SERPL-MCNC: 127 MG/DL — HIGH (ref 70–99)
POTASSIUM SERPL-MCNC: 3.7 MMOL/L — SIGNIFICANT CHANGE UP (ref 3.5–5.3)
POTASSIUM SERPL-SCNC: 3.7 MMOL/L — SIGNIFICANT CHANGE UP (ref 3.5–5.3)
SODIUM SERPL-SCNC: 140 MMOL/L — SIGNIFICANT CHANGE UP (ref 135–145)

## 2022-04-16 PROCEDURE — 99232 SBSQ HOSP IP/OBS MODERATE 35: CPT

## 2022-04-16 RX ADMIN — Medication 1 TABLET(S): at 10:34

## 2022-04-16 RX ADMIN — HEPARIN SODIUM 5000 UNIT(S): 5000 INJECTION INTRAVENOUS; SUBCUTANEOUS at 21:48

## 2022-04-16 RX ADMIN — BUDESONIDE AND FORMOTEROL FUMARATE DIHYDRATE 2 PUFF(S): 160; 4.5 AEROSOL RESPIRATORY (INHALATION) at 08:17

## 2022-04-16 RX ADMIN — Medication 100 MILLIGRAM(S): at 21:48

## 2022-04-16 RX ADMIN — Medication 100 MILLIGRAM(S): at 10:34

## 2022-04-16 RX ADMIN — HEPARIN SODIUM 5000 UNIT(S): 5000 INJECTION INTRAVENOUS; SUBCUTANEOUS at 10:34

## 2022-04-16 RX ADMIN — Medication 1 APPLICATION(S): at 10:35

## 2022-04-16 RX ADMIN — BUDESONIDE AND FORMOTEROL FUMARATE DIHYDRATE 2 PUFF(S): 160; 4.5 AEROSOL RESPIRATORY (INHALATION) at 20:07

## 2022-04-16 RX ADMIN — Medication 4000 UNIT(S): at 10:34

## 2022-04-16 RX ADMIN — CEFEPIME 1000 MILLIGRAM(S): 1 INJECTION, POWDER, FOR SOLUTION INTRAMUSCULAR; INTRAVENOUS at 10:33

## 2022-04-16 RX ADMIN — TIOTROPIUM BROMIDE 1 CAPSULE(S): 18 CAPSULE ORAL; RESPIRATORY (INHALATION) at 08:17

## 2022-04-16 RX ADMIN — Medication 1 APPLICATION(S): at 21:48

## 2022-04-16 NOTE — PROGRESS NOTE ADULT - SUBJECTIVE AND OBJECTIVE BOX
Date of service: 22 @ 13:04    pt seen and examined  has less R ankle discomfort  no new complaints  afebrile  laying in bed, nad     ROS: no fever or chills; denies dizziness, no HA, no SOB or cough, no abdominal pain, no diarrhea or constipation; no dysuria, no urinary frequency, no legs pain, no rashes    MEDICATIONS  (STANDING):  budesonide 160 MICROgram(s)/formoterol 4.5 MICROgram(s) Inhaler 2 Puff(s) Inhalation two times a day  calcium carbonate 1250 mG  + Vitamin D (OsCal 500 + D) 1 Tablet(s) Oral daily  cefepime  Injectable. 1000 milliGRAM(s) IV Push daily  cholecalciferol 4000 Unit(s) Oral daily  Dakins Solution - 1/4 Strength 1 Application(s) Topical two times a day  doxycycline hyclate Capsule 100 milliGRAM(s) Oral every 12 hours  heparin   Injectable 5000 Unit(s) SubCutaneous every 12 hours  propranolol 60 milliGRAM(s) Oral daily  tiotropium 18 MICROgram(s) Capsule 1 Capsule(s) Inhalation daily    Vital Signs Last 24 Hrs  T(C): 36.3 (2022 08:05), Max: 36.6 (15 Apr 2022 16:15)  T(F): 97.3 (2022 08:05), Max: 97.9 (15 Apr 2022 16:15)  HR: 65 (2022 08:05) (65 - 74)  BP: 133/54 (2022 08:05) (133/54 - 153/92)  BP(mean): 73 (15 Apr 2022 16:15) (73 - 73)  RR: 18 (2022 08:05) (17 - 18)  SpO2: 98% (2022 08:05) (96% - 98%)    PE:  Constitutional: NAD   HEENT: NC/AT, EOMI, PERRLA, conjunctivae clear; ears and nose atraumatic; pharynx benign  Neck: supple; thyroid not palpable  Back: no tenderness  Respiratory: respiratory effort normal; clear to auscultation  Cardiovascular: S1S2 regular, no murmurs  Abdomen: soft, not tender, not distended, positive BS; liver and spleen WNL  Genitourinary: no suprapubic tenderness  Lymphatic: no LN palpable  Musculoskeletal: no muscle tenderness, no joint swelling or tenderness  Extremities: no pedal edema R ankle wound with slough, erythema, warmth   Neurological/ Psychiatric: AxOx3, Judgement and insight normal;  moving all extremities  Skin: no rashes; no palpable lesions    Labs: all available labs reviewed                                              11.8   8.64  )-----------( 250      ( 15 Apr 2022 09:45 )             36.4     04-16    140  |  104  |  30<H>  ----------------------------<  127<H>  3.7   |  28  |  1.30    Ca    10.2<H>      2022 08:30            LIVER FUNCTIONS - ( 2022 08:20 )  Alb: 3.3 g/dL / Pro: 7.1 gm/dL / ALK PHOS: 59 U/L / ALT: 10 U/L / AST: 26 U/L / GGT: x           Urinalysis Basic - ( 2022 23:44 )    Color: Yellow / Appearance: Clear / S.010 / pH: x  Gluc: x / Ketone: Negative  / Bili: Negative / Urobili: Negative   Blood: x / Protein: Negative / Nitrite: Negative   Leuk Esterase: Negative / RBC: x / WBC x   Sq Epi: x / Non Sq Epi: x / Bacteria: x      Culture - Blood (22 @ 18:54)   Specimen Source: .Blood Blood-Peripheral   Culture Results:   No growth to date. Culture - Blood (22 @ 18:54)   Specimen Source: .Blood Blood-Peripheral   Culture Results:   No growth to date.     Radiology: all available radiological tests reviewed  < from: CT Ankle No Cont, Right (22 @ 02:18) >  ACC: [id number] EXAM:  CT 3D RECONSTRUCT MATILDE SAAB                        ACC: [id number] EXAM:  CT ANKLE ONLY RT                          PROCEDURE DATE:  2022          INTERPRETATION:  CT OF THE RIGHT ANKLE WITHOUT CONTRAST.    CLINICAL INFORMATION: Status post ORIF of the right ankle. Right ankle   pain and swelling. Evaluate for hardware complication. Lateral wound.  TECHNIQUE: Axial CT images were obtained of the right ankle with coronal   and sagittal reconstructions. No contrast wasadministered. Three   dimensional reconstructions were obtained.    FINDINGS:    The patient is status post ORIF of the distal fibula with a lateral   sideplate and 5 interlocking screws. The hardware appears intact without   evidence of periprosthetic fracture or loosening.    There is circumferential subcutaneous soft tissue edema throughout the   visualized right lower extremity that is most prominent at the medial and   lateral aspects of the ankle. There is no obvious encapsulated fluid   collection. There is minimal overlying skin induration at the lateral   ankle without an obvious deep ulcer identified.    Although limited for evaluation, there is no CT evidence of   osteomyelitis. If this remains a clinical concern, bone scan or metal   reduction MRI can be obtained.    There is mild tibiotalar arthrosis. Chronic appearing osseous productive   change is seen at the median medial malleolus likely related to old   injury and enthesopathic change.    There is vascular calcification present.    IMPRESSION:    Status post ORIF of the distal fibula with the hardware appearing intact   without periprosthetic fracture or loosening.    Medial and lateral subcutaneous soft tissue edema at the ankle without an   obvious abscess or deep ulceration. Circumferential subcutaneous soft   tissue edema throughout the right lower extremity suggesting synovitis.    Although limited for evaluation, there is no CT evidence of   osteomyelitis. If this remains a clinical concern, bone scan or metal   reduction MRI can be obtained.      Advanced directives addressed: full resuscitation

## 2022-04-16 NOTE — PROGRESS NOTE ADULT - SUBJECTIVE AND OBJECTIVE BOX
CC: Cellulitis    Subjective: No acute complaints today. No pain or fever. Ambulating well. Tolerating po.  04/15/22: Patient seen and examined. Walked with PT in the hallway. No new issues. Discussed with patient regarding management and d/c plan.   4/16: No new events, denies pain.  Denies fever, chills, N, V, abd pain, CP, SOB.  Tolerating antiboitics.    Review of  systems reviewed and found to be negative with the exception of what has been described above.      Vital Signs Last 24 Hrs  T(C): 36.3 (16 Apr 2022 08:05), Max: 36.6 (15 Apr 2022 16:15)  T(F): 97.3 (16 Apr 2022 08:05), Max: 97.9 (15 Apr 2022 16:15)  HR: 65 (16 Apr 2022 08:05) (65 - 74)  BP: 133/54 (16 Apr 2022 08:05) (133/54 - 153/92)  BP(mean): 73 (15 Apr 2022 16:15) (73 - 73)  RR: 18 (16 Apr 2022 08:05) (17 - 18)  SpO2: 98% (16 Apr 2022 08:05) (96% - 98%)        PHYSICAL EXAM:    GEN: NAD  HEENT:  pupils equal and reactive, EOMI, no oropharyngeal lesions, erythema, exudates, oral thrush  NECK:   supple, no carotid bruits, no palpable lymph nodes, no thyromegaly  CV:  +S1, +S2, regular, + murmurs or rubs  RESP:   lungs clear to auscultation bilaterally, no wheezing, rales, rhonchi, good air entry bilaterally  BREAST:  not examined  GI:  abdomen soft, non-tender, non-distended, normal BS, no bruits, no abdominal masses, no palpable masses  RECTAL:  not examined  :  not examined  MSK:   normal muscle tone, no atrophy, no rigidity, no contractions  EXT:  no clubbing, no cyanosis, no edema, no calf pain, swelling or erythema  VASCULAR:  pulses equal and symmetric in the upper and lower extremities  NEURO:  AAOX3, no focal neurological deficits, follows all commands, able to move extremities spontaneously  SKIN: Right ankle wound with surrounding redness      MEDICATIONS  (STANDING):  budesonide 160 MICROgram(s)/formoterol 4.5 MICROgram(s) Inhaler 2 Puff(s) Inhalation two times a day  calcium carbonate 1250 mG  + Vitamin D (OsCal 500 + D) 1 Tablet(s) Oral daily  cefepime  Injectable. 1000 milliGRAM(s) IV Push daily  cholecalciferol 4000 Unit(s) Oral daily  Dakins Solution - 1/4 Strength 1 Application(s) Topical two times a day  doxycycline hyclate Capsule 100 milliGRAM(s) Oral every 12 hours  heparin   Injectable 5000 Unit(s) SubCutaneous every 12 hours  propranolol 60 milliGRAM(s) Oral daily  tiotropium 18 MICROgram(s) Capsule 1 Capsule(s) Inhalation daily    MEDICATIONS  (PRN):  acetaminophen     Tablet .. 650 milliGRAM(s) Oral every 6 hours PRN Mild Pain (1 - 3)  ALBUTerol    90 MICROgram(s) HFA Inhaler 2 Puff(s) Inhalation every 6 hours PRN Bronchospasm  artificial  tears Solution 1 Drop(s) Both EYES four times a day PRN Dry Eyes                                11.8   8.64  )-----------( 250      ( 15 Apr 2022 09:45 )             36.4     04-16    140  |  104  |  30<H>  ----------------------------<  127<H>  3.7   |  28  |  1.30    Ca    10.2<H>      16 Apr 2022 08:30      CAPILLARY BLOOD GLUCOSE              Assessment and Plan:  89 y/o F w/ PMH of HTN, essential tremor, OA, glaucoma, COPD, PVD, glaucoma, p/w RLE wound ( History of ORIF ankle 30 years ago) Patient states her RLE wound has been worsening for the last 3 days.    # Right Ankle wound with cellulitis  - Continue cefepime day # 4 and Doxycycline day # 3  - Blood cultures: no growth  - Ortho following, wound care  - Doppler Negative for DVT  - CT ankle negative for OM      # HTN   - Continue propranolol    # COPD   - Continue symbicort  - Continue Spiriva  - Stable    # DVT ppx   -Heparin SubQ    Nutritional Assessment:  · Nutritional Assessment	This patient has been assessed with a concern for Malnutrition and has been determined to have a diagnosis/diagnoses of Severe protein-calorie malnutrition and Underweight (BMI < 19).    This patient is being managed with:   Diet Regular-  DASH/TLC {Sodium & Cholesterol Restricted} (DASH)  Entered: Apr 14 2022  5:29AM

## 2022-04-17 PROCEDURE — 99232 SBSQ HOSP IP/OBS MODERATE 35: CPT

## 2022-04-17 RX ORDER — HYDRALAZINE HCL 50 MG
10 TABLET ORAL THREE TIMES A DAY
Refills: 0 | Status: DISCONTINUED | OUTPATIENT
Start: 2022-04-17 | End: 2022-04-17

## 2022-04-17 RX ORDER — HYDRALAZINE HCL 50 MG
100 TABLET ORAL ONCE
Refills: 0 | Status: DISCONTINUED | OUTPATIENT
Start: 2022-04-17 | End: 2022-04-17

## 2022-04-17 RX ORDER — HYDRALAZINE HCL 50 MG
10 TABLET ORAL ONCE
Refills: 0 | Status: COMPLETED | OUTPATIENT
Start: 2022-04-17 | End: 2022-04-17

## 2022-04-17 RX ORDER — HYDROCHLOROTHIAZIDE 25 MG
25 TABLET ORAL DAILY
Refills: 0 | Status: DISCONTINUED | OUTPATIENT
Start: 2022-04-17 | End: 2022-04-18

## 2022-04-17 RX ADMIN — Medication 1 APPLICATION(S): at 08:43

## 2022-04-17 RX ADMIN — Medication 4000 UNIT(S): at 08:42

## 2022-04-17 RX ADMIN — Medication 25 MILLIGRAM(S): at 15:40

## 2022-04-17 RX ADMIN — Medication 100 MILLIGRAM(S): at 21:58

## 2022-04-17 RX ADMIN — HEPARIN SODIUM 5000 UNIT(S): 5000 INJECTION INTRAVENOUS; SUBCUTANEOUS at 08:43

## 2022-04-17 RX ADMIN — Medication 1 TABLET(S): at 08:42

## 2022-04-17 RX ADMIN — HEPARIN SODIUM 5000 UNIT(S): 5000 INJECTION INTRAVENOUS; SUBCUTANEOUS at 21:58

## 2022-04-17 RX ADMIN — Medication 100 MILLIGRAM(S): at 01:18

## 2022-04-17 RX ADMIN — BUDESONIDE AND FORMOTEROL FUMARATE DIHYDRATE 2 PUFF(S): 160; 4.5 AEROSOL RESPIRATORY (INHALATION) at 20:18

## 2022-04-17 RX ADMIN — Medication 100 MILLIGRAM(S): at 21:59

## 2022-04-17 RX ADMIN — Medication 100 MILLIGRAM(S): at 08:42

## 2022-04-17 RX ADMIN — Medication 1 APPLICATION(S): at 22:03

## 2022-04-17 RX ADMIN — CEFEPIME 1000 MILLIGRAM(S): 1 INJECTION, POWDER, FOR SOLUTION INTRAMUSCULAR; INTRAVENOUS at 08:43

## 2022-04-17 RX ADMIN — Medication 10 MILLIGRAM(S): at 01:18

## 2022-04-17 NOTE — PROVIDER CONTACT NOTE (OTHER) - ACTION/TREATMENT ORDERED:
propranolol 60mg PO to be given now, originally scheduled for 1000
telephone order hydralazine 10mg PO x1, recheck BP 1hr post adm
MD made aware, awaiting any new orders.

## 2022-04-17 NOTE — PROGRESS NOTE ADULT - NUTRITIONAL ASSESSMENT
This patient has been assessed with a concern for Malnutrition and has been determined to have a diagnosis/diagnoses of Severe protein-calorie malnutrition and Underweight (BMI < 19).    This patient is being managed with:   Diet Regular-  DASH/TLC {Sodium & Cholesterol Restricted} (DASH)  Entered: Apr 14 2022  5:29AM    

## 2022-04-17 NOTE — PROGRESS NOTE ADULT - SUBJECTIVE AND OBJECTIVE BOX
CC: Cellulitis    Subjective:     04/15/22: Patient seen and examined. Walked with PT in the hallway. No new issues. Discussed with patient regarding management and d/c plan.   4/16: No new events, denies pain.  Denies fever, chills, N, V, abd pain, CP, SOB.  Tolerating antiboitics.  4/17: Lying in bed, mildly confused but able to re-orientate.  Denies fever, chills, N, V, abd pain, CP, SOB.      Review of  systems reviewed and found to be negative with the exception of what has been described above.    Vital Signs Last 24 Hrs  T(C): 36.5 (17 Apr 2022 00:19), Max: 36.8 (16 Apr 2022 16:27)  T(F): 97.7 (17 Apr 2022 00:19), Max: 98.2 (16 Apr 2022 16:27)  HR: 55 (17 Apr 2022 10:09) (55 - 63)  BP: 136/45 (17 Apr 2022 10:09) (136/45 - 185/94)  BP(mean): --  RR: 18 (17 Apr 2022 08:37) (18 - 18)  SpO2: 94% (17 Apr 2022 08:37) (93% - 95%)      PHYSICAL EXAM:    GEN: NAD  HEENT:  pupils equal and reactive, EOMI, no oropharyngeal lesions, erythema, exudates, oral thrush  NECK:   supple, no carotid bruits, no palpable lymph nodes, no thyromegaly  CV:  +S1, +S2, regular, + murmurs or rubs  RESP:   lungs clear to auscultation bilaterally, no wheezing, rales, rhonchi, good air entry bilaterally  BREAST:  not examined  GI:  abdomen soft, non-tender, non-distended, normal BS, no bruits, no abdominal masses, no palpable masses  RECTAL:  not examined  :  not examined  MSK:   normal muscle tone, no atrophy, no rigidity, no contractions  EXT:  no clubbing, no cyanosis, no edema, no calf pain, swelling or erythema  VASCULAR:  pulses equal and symmetric in the upper and lower extremities  NEURO:  AAOX3, no focal neurological deficits, follows all commands, able to move extremities spontaneously  SKIN: Right ankle wound with surrounding redness      MEDICATIONS  (STANDING):  budesonide 160 MICROgram(s)/formoterol 4.5 MICROgram(s) Inhaler 2 Puff(s) Inhalation two times a day  calcium carbonate 1250 mG  + Vitamin D (OsCal 500 + D) 1 Tablet(s) Oral daily  cefepime  Injectable. 1000 milliGRAM(s) IV Push daily  cholecalciferol 4000 Unit(s) Oral daily  Dakins Solution - 1/4 Strength 1 Application(s) Topical two times a day  doxycycline hyclate Capsule 100 milliGRAM(s) Oral every 12 hours  heparin   Injectable 5000 Unit(s) SubCutaneous every 12 hours  hydrochlorothiazide 25 milliGRAM(s) Oral daily  propranolol 60 milliGRAM(s) Oral daily  tiotropium 18 MICROgram(s) Capsule 1 Capsule(s) Inhalation daily            04-16    140  |  104  |  30<H>  ----------------------------<  127<H>  3.7   |  28  |  1.30    Ca    10.2<H>      16 Apr 2022 08:30      CAPILLARY BLOOD GLUCOSE              Assessment and Plan:  89 y/o F w/ PMH of HTN, essential tremor, OA, glaucoma, COPD, PVD, glaucoma, p/w RLE wound ( History of ORIF ankle 30 years ago) Patient states her RLE wound has been worsening for the last 3 days.    # Right Ankle wound with cellulitis  - Continue cefepime day # 5 and Doxycycline day # 4  - Blood cultures: no growth  - Ortho following, wound care  - Doppler Negative for DVT  - CT ankle negative for OM      # HTN   - Continue propranolol  - restart home HCTZ    # COPD   - Continue symbicort  - Continue Spiriva  - Stable    # DVT ppx   -Heparin SubQ    Nutritional Assessment:  · Nutritional Assessment	This patient has been assessed with a concern for Malnutrition and has been determined to have a diagnosis/diagnoses of Severe protein-calorie malnutrition and Underweight (BMI < 19).    This patient is being managed with:   Diet Regular-  DASH/TLC {Sodium & Cholesterol Restricted} (DASH)  Entered: Apr 14 2022  5:29AM

## 2022-04-17 NOTE — PROGRESS NOTE ADULT - SUBJECTIVE AND OBJECTIVE BOX
Date of service: 22 @ 15:42    pt seen and examined  has less R ankle pain  less swelling, redness  no new complaints  afebrile    ROS: no fever or chills; denies dizziness, no HA, no SOB or cough, no abdominal pain, no diarrhea or constipation; no dysuria, no urinary frequency, no legs pain, no rashes    MEDICATIONS  (STANDING):  budesonide 160 MICROgram(s)/formoterol 4.5 MICROgram(s) Inhaler 2 Puff(s) Inhalation two times a day  calcium carbonate 1250 mG  + Vitamin D (OsCal 500 + D) 1 Tablet(s) Oral daily  cefepime  Injectable. 1000 milliGRAM(s) IV Push daily  cholecalciferol 4000 Unit(s) Oral daily  Dakins Solution - 1/4 Strength 1 Application(s) Topical two times a day  doxycycline hyclate Capsule 100 milliGRAM(s) Oral every 12 hours  heparin   Injectable 5000 Unit(s) SubCutaneous every 12 hours  hydrochlorothiazide 25 milliGRAM(s) Oral daily  propranolol 60 milliGRAM(s) Oral daily  tiotropium 18 MICROgram(s) Capsule 1 Capsule(s) Inhalation daily    Vital Signs Last 24 Hrs  T(C): 36.5 (2022 00:19), Max: 36.8 (2022 16:27)  T(F): 97.7 (2022 00:19), Max: 98.2 (2022 16:27)  HR: 55 (2022 10:09) (55 - 63)  BP: 136/45 (2022 10:09) (136/45 - 185/94)  BP(mean): --  RR: 18 (2022 08:37) (18 - 18)  SpO2: 94% (2022 08:37) (93% - 95%)      PE:  Constitutional: NAD   HEENT: NC/AT, EOMI, PERRLA, conjunctivae clear; ears and nose atraumatic; pharynx benign  Neck: supple; thyroid not palpable  Back: no tenderness  Respiratory: respiratory effort normal; clear to auscultation  Cardiovascular: S1S2 regular, no murmurs  Abdomen: soft, not tender, not distended, positive BS; liver and spleen WNL  Genitourinary: no suprapubic tenderness  Lymphatic: no LN palpable  Musculoskeletal: no muscle tenderness, no joint swelling or tenderness  Extremities: no pedal edema R ankle wound with slough, erythema, warmth   Neurological/ Psychiatric: AxOx3, Judgement and insight normal;  moving all extremities  Skin: no rashes; no palpable lesions    Labs: all available labs reviewed                                              11.8   8.64  )-----------( 250      ( 15 Apr 2022 09:45 )             36.4     04-16    140  |  104  |  30<H>  ----------------------------<  127<H>  3.7   |  28  |  1.30    Ca    10.2<H>      2022 08:30        LIVER FUNCTIONS - ( 2022 08:20 )  Alb: 3.3 g/dL / Pro: 7.1 gm/dL / ALK PHOS: 59 U/L / ALT: 10 U/L / AST: 26 U/L / GGT: x           Urinalysis Basic - ( 2022 23:44 )    Color: Yellow / Appearance: Clear / S.010 / pH: x  Gluc: x / Ketone: Negative  / Bili: Negative / Urobili: Negative   Blood: x / Protein: Negative / Nitrite: Negative   Leuk Esterase: Negative / RBC: x / WBC x   Sq Epi: x / Non Sq Epi: x / Bacteria: x      Culture - Blood (22 @ 18:54)   Specimen Source: .Blood Blood-Peripheral   Culture Results:   No growth to date.   Culture - Blood (22 @ 18:54)   Specimen Source: .Blood Blood-Peripheral   Culture Results:   No growth to date.     Radiology: all available radiological tests reviewed  < from: CT Ankle No Cont, Right (22 @ 02:18) >  ACC: [id number] EXAM:  CT 3D RECONSTRUCT MATILDE SAAB                        ACC: [id number] EXAM:  CT ANKLE ONLY RT                          PROCEDURE DATE:  2022          INTERPRETATION:  CT OF THE RIGHT ANKLE WITHOUT CONTRAST.    CLINICAL INFORMATION: Status post ORIF of the right ankle. Right ankle   pain and swelling. Evaluate for hardware complication. Lateral wound.  TECHNIQUE: Axial CT images were obtained of the right ankle with coronal   and sagittal reconstructions. No contrast wasadministered. Three   dimensional reconstructions were obtained.    FINDINGS:    The patient is status post ORIF of the distal fibula with a lateral   sideplate and 5 interlocking screws. The hardware appears intact without   evidence of periprosthetic fracture or loosening.    There is circumferential subcutaneous soft tissue edema throughout the   visualized right lower extremity that is most prominent at the medial and   lateral aspects of the ankle. There is no obvious encapsulated fluid   collection. There is minimal overlying skin induration at the lateral   ankle without an obvious deep ulcer identified.    Although limited for evaluation, there is no CT evidence of   osteomyelitis. If this remains a clinical concern, bone scan or metal   reduction MRI can be obtained.    There is mild tibiotalar arthrosis. Chronic appearing osseous productive   change is seen at the median medial malleolus likely related to old   injury and enthesopathic change.    There is vascular calcification present.    IMPRESSION:    Status post ORIF of the distal fibula with the hardware appearing intact   without periprosthetic fracture or loosening.    Medial and lateral subcutaneous soft tissue edema at the ankle without an   obvious abscess or deep ulceration. Circumferential subcutaneous soft   tissue edema throughout the right lower extremity suggesting synovitis.    Although limited for evaluation, there is no CT evidence of   osteomyelitis. If this remains a clinical concern, bone scan or metal   reduction MRI can be obtained.      Advanced directives addressed: full resuscitation

## 2022-04-17 NOTE — PROVIDER CONTACT NOTE (OTHER) - SITUATION
Dr Clemente is aware that her patient is the hospital  please fax over the discharged paperwork to [phone] once patient is discharged
/94, NAD, Asymptomatic, resting comfortably in bed
Pt BP remains elevated at 167/54, NAD Asymptomatic
Pt remains hypertensive 166/58, HR 58, no change in mental status, asymptomatic, resting in bed.

## 2022-04-18 ENCOUNTER — TRANSCRIPTION ENCOUNTER (OUTPATIENT)
Age: 87
End: 2022-04-18

## 2022-04-18 VITALS — OXYGEN SATURATION: 97 %

## 2022-04-18 LAB
ANION GAP SERPL CALC-SCNC: 6 MMOL/L — SIGNIFICANT CHANGE UP (ref 5–17)
BUN SERPL-MCNC: 47 MG/DL — HIGH (ref 7–23)
CALCIUM SERPL-MCNC: 10.4 MG/DL — HIGH (ref 8.5–10.1)
CHLORIDE SERPL-SCNC: 105 MMOL/L — SIGNIFICANT CHANGE UP (ref 96–108)
CO2 SERPL-SCNC: 27 MMOL/L — SIGNIFICANT CHANGE UP (ref 22–31)
CREAT SERPL-MCNC: 1.49 MG/DL — HIGH (ref 0.5–1.3)
EGFR: 32 ML/MIN/1.73M2 — LOW
GLUCOSE SERPL-MCNC: 142 MG/DL — HIGH (ref 70–99)
POTASSIUM SERPL-MCNC: 3.7 MMOL/L — SIGNIFICANT CHANGE UP (ref 3.5–5.3)
POTASSIUM SERPL-SCNC: 3.7 MMOL/L — SIGNIFICANT CHANGE UP (ref 3.5–5.3)
SODIUM SERPL-SCNC: 138 MMOL/L — SIGNIFICANT CHANGE UP (ref 135–145)

## 2022-04-18 PROCEDURE — 99239 HOSP IP/OBS DSCHRG MGMT >30: CPT

## 2022-04-18 RX ORDER — SODIUM HYPOCHLORITE 0.125 %
1 SOLUTION, NON-ORAL MISCELLANEOUS
Qty: 0 | Refills: 0 | DISCHARGE
Start: 2022-04-18

## 2022-04-18 RX ADMIN — Medication 25 MILLIGRAM(S): at 09:47

## 2022-04-18 RX ADMIN — BUDESONIDE AND FORMOTEROL FUMARATE DIHYDRATE 2 PUFF(S): 160; 4.5 AEROSOL RESPIRATORY (INHALATION) at 08:37

## 2022-04-18 RX ADMIN — HEPARIN SODIUM 5000 UNIT(S): 5000 INJECTION INTRAVENOUS; SUBCUTANEOUS at 09:47

## 2022-04-18 RX ADMIN — Medication 100 MILLIGRAM(S): at 09:47

## 2022-04-18 RX ADMIN — TIOTROPIUM BROMIDE 1 CAPSULE(S): 18 CAPSULE ORAL; RESPIRATORY (INHALATION) at 08:39

## 2022-04-18 RX ADMIN — CEFEPIME 1000 MILLIGRAM(S): 1 INJECTION, POWDER, FOR SOLUTION INTRAMUSCULAR; INTRAVENOUS at 09:50

## 2022-04-18 RX ADMIN — Medication 4000 UNIT(S): at 09:46

## 2022-04-18 RX ADMIN — Medication 1 APPLICATION(S): at 09:49

## 2022-04-18 NOTE — DISCHARGE NOTE NURSING/CASE MANAGEMENT/SOCIAL WORK - NSDCPEFALRISK_GEN_ALL_CORE
For information on Fall & Injury Prevention, visit: [email or web address] OR  [email or web address] OR  https://www.cdc.gov/steadi/patient.html

## 2022-04-18 NOTE — PROGRESS NOTE ADULT - PROVIDER SPECIALTY LIST ADULT
Hospitalist
Infectious Disease
Infectious Disease
Orthopedics
Hospitalist
Infectious Disease
Infectious Disease
Hospitalist
Hospitalist

## 2022-04-18 NOTE — PROGRESS NOTE ADULT - SUBJECTIVE AND OBJECTIVE BOX
Date of service: 22 @ 12:19    pt seen and examined  sitting up in chair  feels better this am  lle/l ankle wound improving  afebrile    ROS: no fever or chills; denies dizziness, no HA, no SOB or cough, no abdominal pain, no diarrhea or constipation; no dysuria, no urinary frequency, no legs pain, no rashes    MEDICATIONS  (STANDING):  budesonide 160 MICROgram(s)/formoterol 4.5 MICROgram(s) Inhaler 2 Puff(s) Inhalation two times a day  calcium carbonate 1250 mG  + Vitamin D (OsCal 500 + D) 1 Tablet(s) Oral daily  cefepime  Injectable. 1000 milliGRAM(s) IV Push daily  cholecalciferol 4000 Unit(s) Oral daily  Dakins Solution - 1/4 Strength 1 Application(s) Topical two times a day  doxycycline hyclate Capsule 100 milliGRAM(s) Oral every 12 hours  heparin   Injectable 5000 Unit(s) SubCutaneous every 12 hours  hydrochlorothiazide 25 milliGRAM(s) Oral daily  propranolol 60 milliGRAM(s) Oral daily  tiotropium 18 MICROgram(s) Capsule 1 Capsule(s) Inhalation daily      Vital Signs Last 24 Hrs  T(C): 36.4 (2022 07:27), Max: 36.6 (2022 21:00)  T(F): 97.6 (2022 07:27), Max: 97.8 (2022 21:00)  HR: 67 (2022 08:40) (55 - 83)  BP: 157/51 (2022 07:27) (151/70 - 166/46)  BP(mean): --  RR: 18 (2022 07:27) (18 - 18)  SpO2: 97% (2022 08:40) (93% - 97%)        PE:  Constitutional: NAD   HEENT: NC/AT, EOMI, PERRLA, conjunctivae clear; ears and nose atraumatic; pharynx benign  Neck: supple; thyroid not palpable  Back: no tenderness  Respiratory: respiratory effort normal; clear to auscultation  Cardiovascular: S1S2 regular, no murmurs  Abdomen: soft, not tender, not distended, positive BS; liver and spleen WNL  Genitourinary: no suprapubic tenderness  Lymphatic: no LN palpable  Musculoskeletal: no muscle tenderness, no joint swelling or tenderness  Extremities: no pedal edema R ankle wound with dry eschar with resolving erythema, warmth   Neurological/ Psychiatric: AxOx3, Judgement and insight normal;  moving all extremities  Skin: no rashes; no palpable lesions    Labs: all available labs reviewed                   138  |  105  |  47<H>  ----------------------------<  142<H>  3.7   |  27  |  1.49<H>    Ca    10.4<H>      2022 10:16        LIVER FUNCTIONS - ( 2022 08:20 )  Alb: 3.3 g/dL / Pro: 7.1 gm/dL / ALK PHOS: 59 U/L / ALT: 10 U/L / AST: 26 U/L / GGT: x           Urinalysis Basic - ( 2022 23:44 )    Color: Yellow / Appearance: Clear / S.010 / pH: x  Gluc: x / Ketone: Negative  / Bili: Negative / Urobili: Negative   Blood: x / Protein: Negative / Nitrite: Negative   Leuk Esterase: Negative / RBC: x / WBC x   Sq Epi: x / Non Sq Epi: x / Bacteria: x      Culture - Blood (22 @ 18:54)   Specimen Source: .Blood Blood-Peripheral   Culture Results:   No growth to date.   Culture - Blood (22 @ 18:54)   Specimen Source: .Blood Blood-Peripheral   Culture Results:   No growth to date.     Radiology: all available radiological tests reviewed  < from: CT Ankle No Cont, Right (22 @ 02:18) >  ACC: [id number] EXAM:  CT 3D RECONSTRUCT  [facility]                        ACC: [id number] EXAM:  CT ANKLE ONLY RT                          PROCEDURE DATE:  2022          INTERPRETATION:  CT OF THE RIGHT ANKLE WITHOUT CONTRAST.    CLINICAL INFORMATION: Status post ORIF of the right ankle. Right ankle   pain and swelling. Evaluate for hardware complication. Lateral wound.  TECHNIQUE: Axial CT images were obtained of the right ankle with coronal   and sagittal reconstructions. No contrast wasadministered. Three   dimensional reconstructions were obtained.    FINDINGS:    The patient is status post ORIF of the distal fibula with a lateral   sideplate and 5 interlocking screws. The hardware appears intact without   evidence of periprosthetic fracture or loosening.    There is circumferential subcutaneous soft tissue edema throughout the   visualized right lower extremity that is most prominent at the medial and   lateral aspects of the ankle. There is no obvious encapsulated fluid   collection. There is minimal overlying skin induration at the lateral   ankle without an obvious deep ulcer identified.    Although limited for evaluation, there is no CT evidence of   osteomyelitis. If this remains a clinical concern, bone scan or metal   reduction MRI can be obtained.    There is mild tibiotalar arthrosis. Chronic appearing osseous productive   change is seen at the median medial malleolus likely related to old   injury and enthesopathic change.    There is vascular calcification present.    IMPRESSION:    Status post ORIF of the distal fibula with the hardware appearing intact   without periprosthetic fracture or loosening.    Medial and lateral subcutaneous soft tissue edema at the ankle without an   obvious abscess or deep ulceration. Circumferential subcutaneous soft   tissue edema throughout the right lower extremity suggesting synovitis.    Although limited for evaluation, there is no CT evidence of   osteomyelitis. If this remains a clinical concern, bone scan or metal   reduction MRI can be obtained.      Advanced directives addressed: full resuscitation

## 2022-04-18 NOTE — DISCHARGE NOTE PROVIDER - HOSPITAL COURSE
CC: Cellulitis  HPI/HOspital course:  89 y/o F w/ PMH of HTN, essential tremor, OA, glaucoma, COPD, PVD, glaucoma, p/w RLE wound. Patient states her RLE wound has been worsening for the last 3 days. C/o pain at the site. At baseline patient lives at home and walks with assistive device. Denies fever, chills, cough, runny nose, sore throat, nausea, vomiting, abdominal pain.  Pt admitted and treated for right Ankle wound with cellulitis.  He completed 5 days cefepime and 4 days doxycycline with improvement in wound.  Pt seen by ortho for wound care.  No indication for surgical intervention.  Doppler negative for DVT.  CT shows Status post ORIF of the distal fibula with the hardware appearing intact without periprosthetic fracture or loosening. Medial and lateral subcutaneous soft tissue edema at the ankle without an obvious abscess or deep ulceration. Circumferential subcutaneous soft   tissue edema throughout the right lower extremity suggesting synovitis.  No evidence of OM.  Pt is HD stable, comfortable and can go home to complete 10 days total of antibiotics.    REVIEW OF SYSTEMS: All other review of systems is negative unless indicated above.    Vital Signs Last 24 Hrs  T(C): 36.4 (18 Apr 2022 07:27), Max: 36.6 (17 Apr 2022 21:00)  T(F): 97.6 (18 Apr 2022 07:27), Max: 97.8 (17 Apr 2022 21:00)  HR: 67 (18 Apr 2022 08:40) (55 - 83)  BP: 157/51 (18 Apr 2022 07:27) (151/70 - 166/46)  BP(mean): --  RR: 18 (18 Apr 2022 07:27) (18 - 18)  SpO2: 97% (18 Apr 2022 08:40) (93% - 97%)    PHYSICAL EXAM:    GEN: NAD  HEENT:  pupils equal and reactive, EOMI, no oropharyngeal lesions, erythema, exudates, oral thrush  NECK:   supple, no carotid bruits, no palpable lymph nodes, no thyromegaly  CV:  +S1, +S2, regular, + murmurs or rubs  RESP:   lungs clear to auscultation bilaterally, no wheezing, rales, rhonchi, good air entry bilaterally  BREAST:  not examined  GI:  abdomen soft, non-tender, non-distended, normal BS, no bruits, no abdominal masses, no palpable masses  RECTAL:  not examined  :  not examined  MSK:   normal muscle tone, no atrophy, no rigidity, no contractions  EXT:  no clubbing, no cyanosis, no edema, no calf pain, swelling or erythema  VASCULAR:  pulses equal and symmetric in the upper and lower extremities  NEURO:  AAOX3, no focal neurological deficits, follows all commands, able to move extremities spontaneously  SKIN: Right ankle in bandage    med/labs: Reviewed and interpreted       Assessment and Plan:  89 y/o F w/ PMH of HTN, essential tremor, OA, glaucoma, COPD, PVD, glaucoma, p/w RLE wound ( History of ORIF ankle 30 years ago) Patient states her RLE wound has been worsening for the last 3 days.    # Right Ankle wound with cellulitis  - Continue cefepime day # 5 and Doxycycline day # 4  - d/c on oral doxy to complete 10 day course total  - Blood cultures: no growth  - Ortho following, c/w wound care per instruction  - Doppler Negative for DVT  - CT ankle negative for OM      # HTN   - Continue propranolol  - restart home HCTZ    # COPD   - Continue symbicort  - Continue Spiriva  - Stable    # DVT ppx   -Heparin SubQ    d/c home with out patient antibiotics    Attending Statement: 40 minutes spent on total encounter and discharge planning.

## 2022-04-18 NOTE — DISCHARGE NOTE NURSING/CASE MANAGEMENT/SOCIAL WORK - PATIENT PORTAL LINK FT
You can access the FollowMyHealth Patient Portal offered by [facility] by registering at the following website: [email or web address]. By joining [employer]’s FollowMyHealth portal, you will also be able to view your health information using other applications (apps) compatible with our system.

## 2022-04-18 NOTE — PROGRESS NOTE ADULT - ASSESSMENT
89 y/o F w/ PMH of HTN, essential tremor, OA, glaucoma, COPD, PVD, glaucoma, p/w RLE wound. Patient states her RLE wound has been worsening for the last 3 days. C/o pain at the site. At baseline patient lives at home and walks with assistive device. Denies fever, chills, cough, runny nose, sore throat, nausea, vomiting, abdominal pain. Had CT RLE and was given vancomycin/cefepime.      1. R ankle wound. superimposed cellulitis. s/p R ankle ORIF. PVD. chronic LE stasis dermatitis  - imaging reviewed  - ortho follow up appreciated, no exposed hardware currently, if R ankle wound worsens, hardware becomes exposed may require debridement/SABI in future  - wound care eval noted  - on doxycycline 100mg BID #2  - on cefepime adjust dose to 1gm daily #3  - continue with abx coverage   - blood cx no growth  - monitor temps  - tolerating antibiotics without rashes or side effects  - reason for abx use and side effects reviewed with patient  - supportive care  - fu cbc    2. other issues - care per medicine 
89 y/o F w/ PMH of HTN, essential tremor, OA, glaucoma, COPD, PVD, glaucoma, p/w RLE wound. Patient states her RLE wound has been worsening for the last 3 days. C/o pain at the site. At baseline patient lives at home and walks with assistive device. Denies fever, chills, cough, runny nose, sore throat, nausea, vomiting, abdominal pain. Had CT RLE and was given vancomycin/cefepime.      1. R ankle wound. superimposed cellulitis. s/p R ankle ORIF. PVD. chronic LE stasis dermatitis  - imaging reviewed  - ortho follow up appreciated, no exposed hardware currently, if R ankle wound worsens, hardware becomes exposed may require debridement/SABI in future  - wound care eval noted  - on doxycycline 100mg BID #3  - on cefepime adjust dose to 1gm daily #4  - continue with abx coverage   - blood cx no growth  - monitor temps  - tolerating antibiotics without rashes or side effects  - reason for abx use and side effects reviewed with patient  - supportive care  - fu cbc    2. other issues - care per medicine 
89 y/o F w/ PMH of HTN, essential tremor, OA, glaucoma, COPD, PVD, glaucoma, p/w RLE wound ( History of ORIF ankle 30 years ago) Patient states her RLE wound has been worsening for the last 3 days.    # Right Ankle wound with cellulitis  - Continue cefepime and Doxycycline  - FU Blood cultures   - Ortho consult appreciated  - Doppler Negative for DVT  - CT ankle negative for OM  - FU Ortho/ID recommendations    # HTN   - Continue propranolol    # COPD   - Continue symbicort  - Continue Spiriva  - Stable    # DVT ppx   -Heparin SubQ
89 y/o F w/ PMH of HTN, essential tremor, OA, glaucoma, COPD, PVD, glaucoma, p/w RLE wound ( History of ORIF ankle 30 years ago) Patient states her RLE wound has been worsening for the last 3 days.    # Right Ankle wound with cellulitis  - Continue cefepime and Doxycycline  - FU Blood cultures: no growth  - Ortho consult appreciated  - Doppler Negative for DVT  - CT ankle negative for OM  - FU Ortho/ID recommendations    # HTN   - Continue propranolol    # COPD   - Continue symbicort  - Continue Spiriva  - Stable    # DVT ppx   -Heparin SubQ
89 y/o F w/ PMH of HTN, essential tremor, OA, glaucoma, COPD, PVD, glaucoma, p/w RLE wound. Patient states her RLE wound has been worsening for the last 3 days. C/o pain at the site. At baseline patient lives at home and walks with assistive device. Denies fever, chills, cough, runny nose, sore throat, nausea, vomiting, abdominal pain. Had CT RLE and was given vancomycin/cefepime.      1. R ankle wound. superimposed cellulitis. s/p R ankle ORIF. PVD. chronic LE stasis dermatitis  - imaging reviewed  - ortho follow up appreciated, no exposed hardware currently, if R ankle wound worsens, hardware becomes exposed may require debridement/SABI in future  - wound care eval noted  - on doxycycline 100mg BID #5  - on cefepime adjust dose to 1gm daily #6  - continue with abx coverage   - blood cx no growth  - monitor temps  - tolerating antibiotics without rashes or side effects  - reason for abx use and side effects reviewed with patient  - on dc po doxy complete 7-10 day course  - supportive care  - fu cbc    2. other issues - care per medicine 
89 y/o F w/ PMH of HTN, essential tremor, OA, glaucoma, COPD, PVD, glaucoma, p/w RLE wound. Patient states her RLE wound has been worsening for the last 3 days. C/o pain at the site. At baseline patient lives at home and walks with assistive device. Denies fever, chills, cough, runny nose, sore throat, nausea, vomiting, abdominal pain. Had CT RLE and was given vancomycin/cefepime.      1. R ankle wound. superimposed cellulitis. s/p R ankle ORIF. PVD. chronic LE stasis dermatitis  - imaging reviewed  - ortho follow up appreciated, no exposed hardware currently, if R ankle wound worsens, hardware becomes exposed may require debridement/SABI in future  - wound care eval noted  - on doxycycline 100mg BID #4  - on cefepime adjust dose to 1gm daily #5  - continue with abx coverage   - blood cx no growth  - monitor temps  - tolerating antibiotics without rashes or side effects  - reason for abx use and side effects reviewed with patient  - on dc po doxy complete 7-10 day course  - supportive care  - fu cbc    2. other issues - care per medicine

## 2022-04-18 NOTE — DISCHARGE NOTE PROVIDER - NSDCMRMEDTOKEN_GEN_ALL_CORE_FT
Advair Diskus 250 mcg-50 mcg inhalation powder: 1 puff(s) inhaled 2 times a day  Citracal 250 mg + D 200 intl units oral tablet: 1 tab(s) orally 2 times a day, As Needed  doxycycline monohydrate 100 mg oral capsule: 1 cap(s) orally every 12 hours  hydroCHLOROthiazide 25 mg oral tablet: 1 tab(s) orally once a day  propranolol 60 mg oral capsule, extended release: 1 cap(s) orally once a day  Proventil HFA 90 mcg/inh inhalation aerosol: 2 puff(s) inhaled every 6 hours, As Needed  sodium hypochlorite 0.125% topical solution: 1 application topically 2 times a day  Spiriva HandiHaler 18 mcg inhalation capsule: 1 cap(s) inhaled once a day at noon  TheraTears ophthalmic solution: 1 drop(s) to each affected eye 4 times a day, As Needed  Tylenol 500 mg oral tablet: 2 tab(s) orally every 6 hours, As Needed  Vitamin D3 2000 intl units oral capsule: 2 cap(s) orally once a day

## 2022-04-18 NOTE — DISCHARGE NOTE PROVIDER - NSDCCPCAREPLAN_GEN_ALL_CORE_FT
PRINCIPAL DISCHARGE DIAGNOSIS  Diagnosis: Infected wound  Assessment and Plan of Treatment: Take antibiotics as prescribed for 5 more days per infectious disease recommendations (Sent to pharmacy)  Wound care per instructions  follow up with pcp 5-7 days

## 2022-04-18 NOTE — DISCHARGE NOTE PROVIDER - DETAILS OF MALNUTRITION DIAGNOSIS/DIAGNOSES
This patient has been assessed with a concern for Malnutrition and was treated during this hospitalization for the following Nutrition diagnosis/diagnoses:     -  04/14/2022: Severe protein-calorie malnutrition   -  04/14/2022: Underweight (BMI < 19)

## 2022-04-25 NOTE — CDI QUERY NOTE - NSCDIOTHERTXTBX_GEN_ALL_CORE_HH
Please document the relationship between the following conditions:  A) Cellulitis is secondary to previous right ankle ORIF  B) Cellulitis is not secondary to previous right ankle ORIF  C) Not clinically significant  D Other ( Please clarify)       SUPPORTING DOCUMENTATION AND/OR CLINICAL EVIDENCE    Orthopedics documentation on 4/14/2022:  - Large lateral ankle wound ulceration in the setting of previous right ankle ORIF, no radiographic concerns for underlying OM    Hospitalist progress note on 4/17/2022:# Right Ankle wound with cellulitis  - Continue cefepime day # 5 and Doxycycline day # 4  - Blood cultures: no growth  - Ortho following, wound care  - Doppler Negative for DVT  - CT ankle negative for OM

## 2022-04-27 DIAGNOSIS — I12.9 HYPERTENSIVE CHRONIC KIDNEY DISEASE WITH STAGE 1 THROUGH STAGE 4 CHRONIC KIDNEY DISEASE, OR UNSPECIFIED CHRONIC KIDNEY DISEASE: ICD-10-CM

## 2022-04-27 DIAGNOSIS — I87.2 VENOUS INSUFFICIENCY (CHRONIC) (PERIPHERAL): ICD-10-CM

## 2022-04-27 DIAGNOSIS — Z79.51 LONG TERM (CURRENT) USE OF INHALED STEROIDS: ICD-10-CM

## 2022-04-27 DIAGNOSIS — Z87.891 PERSONAL HISTORY OF NICOTINE DEPENDENCE: ICD-10-CM

## 2022-04-27 DIAGNOSIS — N18.9 CHRONIC KIDNEY DISEASE, UNSPECIFIED: ICD-10-CM

## 2022-04-27 DIAGNOSIS — M19.90 UNSPECIFIED OSTEOARTHRITIS, UNSPECIFIED SITE: ICD-10-CM

## 2022-04-27 DIAGNOSIS — J43.9 EMPHYSEMA, UNSPECIFIED: ICD-10-CM

## 2022-04-27 DIAGNOSIS — H40.9 UNSPECIFIED GLAUCOMA: ICD-10-CM

## 2022-04-27 DIAGNOSIS — Z88.2 ALLERGY STATUS TO SULFONAMIDES: ICD-10-CM

## 2022-04-27 DIAGNOSIS — I73.9 PERIPHERAL VASCULAR DISEASE, UNSPECIFIED: ICD-10-CM

## 2022-04-27 DIAGNOSIS — L03.115 CELLULITIS OF RIGHT LOWER LIMB: ICD-10-CM

## 2022-04-27 DIAGNOSIS — E43 UNSPECIFIED SEVERE PROTEIN-CALORIE MALNUTRITION: ICD-10-CM

## 2022-04-27 DIAGNOSIS — L97.311 NON-PRESSURE CHRONIC ULCER OF RIGHT ANKLE LIMITED TO BREAKDOWN OF SKIN: ICD-10-CM

## 2022-04-27 DIAGNOSIS — M65.9 SYNOVITIS AND TENOSYNOVITIS, UNSPECIFIED: ICD-10-CM

## 2022-04-27 DIAGNOSIS — Z88.5 ALLERGY STATUS TO NARCOTIC AGENT: ICD-10-CM

## 2022-04-27 DIAGNOSIS — Z99.81 DEPENDENCE ON SUPPLEMENTAL OXYGEN: ICD-10-CM

## 2022-09-09 ENCOUNTER — EMERGENCY (EMERGENCY)
Facility: HOSPITAL | Age: 87
LOS: 0 days | Discharge: HOME | End: 2022-09-09
Attending: STUDENT IN AN ORGANIZED HEALTH CARE EDUCATION/TRAINING PROGRAM
Payer: MEDICARE

## 2022-09-09 VITALS
HEART RATE: 68 BPM | DIASTOLIC BLOOD PRESSURE: 85 MMHG | SYSTOLIC BLOOD PRESSURE: 163 MMHG | OXYGEN SATURATION: 95 % | TEMPERATURE: 98 F | RESPIRATION RATE: 18 BRPM

## 2022-09-09 VITALS — HEIGHT: 57 IN | WEIGHT: 89.95 LBS

## 2022-09-09 DIAGNOSIS — M81.0 AGE-RELATED OSTEOPOROSIS WITHOUT CURRENT PATHOLOGICAL FRACTURE: ICD-10-CM

## 2022-09-09 DIAGNOSIS — I73.9 PERIPHERAL VASCULAR DISEASE, UNSPECIFIED: ICD-10-CM

## 2022-09-09 DIAGNOSIS — M19.90 UNSPECIFIED OSTEOARTHRITIS, UNSPECIFIED SITE: ICD-10-CM
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
## 2022-09-09 DIAGNOSIS — I10 ESSENTIAL (PRIMARY) HYPERTENSION: ICD-10-CM

## 2022-09-09 DIAGNOSIS — S80.211A ABRASION, RIGHT KNEE, INITIAL ENCOUNTER: ICD-10-CM

## 2022-09-09 DIAGNOSIS — Z23 ENCOUNTER FOR IMMUNIZATION: ICD-10-CM

## 2022-09-09 DIAGNOSIS — Z88.2 ALLERGY STATUS TO SULFONAMIDES: ICD-10-CM

## 2022-09-09 DIAGNOSIS — S81.001A UNSPECIFIED OPEN WOUND, RIGHT KNEE, INITIAL ENCOUNTER: ICD-10-CM

## 2022-09-09 DIAGNOSIS — Z88.5 ALLERGY STATUS TO NARCOTIC AGENT: ICD-10-CM

## 2022-09-09 DIAGNOSIS — H40.9 UNSPECIFIED GLAUCOMA: ICD-10-CM

## 2022-09-09 DIAGNOSIS — M47.812 SPONDYLOSIS WITHOUT MYELOPATHY OR RADICULOPATHY, CERVICAL REGION: ICD-10-CM

## 2022-09-09 DIAGNOSIS — S50.12XA CONTUSION OF LEFT FOREARM, INITIAL ENCOUNTER: ICD-10-CM

## 2022-09-09 DIAGNOSIS — J44.9 CHRONIC OBSTRUCTIVE PULMONARY DISEASE, UNSPECIFIED: ICD-10-CM
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
## 2022-09-09 DIAGNOSIS — S50.01XA CONTUSION OF RIGHT ELBOW, INITIAL ENCOUNTER: ICD-10-CM

## 2022-09-09 DIAGNOSIS — G25.0 ESSENTIAL TREMOR: ICD-10-CM

## 2022-09-09 PROBLEM — J43.9 EMPHYSEMA, UNSPECIFIED: Chronic | Status: ACTIVE | Noted: 2022-04-13

## 2022-09-09 PROBLEM — Z86.39 PERSONAL HISTORY OF OTHER ENDOCRINE, NUTRITIONAL AND METABOLIC DISEASE: Chronic | Status: ACTIVE | Noted: 2022-04-13

## 2022-09-09 PROCEDURE — 73564 X-RAY EXAM KNEE 4 OR MORE: CPT | Mod: 26,RT

## 2022-09-09 PROCEDURE — 73110 X-RAY EXAM OF WRIST: CPT | Mod: 26,LT

## 2022-09-09 PROCEDURE — 73110 X-RAY EXAM OF WRIST: CPT | Mod: LT

## 2022-09-09 PROCEDURE — 73080 X-RAY EXAM OF ELBOW: CPT | Mod: 26,RT

## 2022-09-09 PROCEDURE — 99284 EMERGENCY DEPT VISIT MOD MDM: CPT

## 2022-09-09 PROCEDURE — 73564 X-RAY EXAM KNEE 4 OR MORE: CPT | Mod: RT

## 2022-09-09 PROCEDURE — 99284 EMERGENCY DEPT VISIT MOD MDM: CPT | Mod: 25

## 2022-09-09 PROCEDURE — 70450 CT HEAD/BRAIN W/O DYE: CPT | Mod: MA

## 2022-09-09 PROCEDURE — 70450 CT HEAD/BRAIN W/O DYE: CPT | Mod: 26,MA

## 2022-09-09 PROCEDURE — 72125 CT NECK SPINE W/O DYE: CPT | Mod: 26,MA

## 2022-09-09 PROCEDURE — 72125 CT NECK SPINE W/O DYE: CPT | Mod: MA

## 2022-09-09 PROCEDURE — 90715 TDAP VACCINE 7 YRS/> IM: CPT

## 2022-09-09 PROCEDURE — 73080 X-RAY EXAM OF ELBOW: CPT | Mod: RT

## 2022-09-09 RX ORDER — TETANUS TOXOID, REDUCED DIPHTHERIA TOXOID AND ACELLULAR PERTUSSIS VACCINE, ADSORBED 5; 2.5; 8; 8; 2.5 [IU]/.5ML; [IU]/.5ML; UG/.5ML; UG/.5ML; UG/.5ML
0.5 SUSPENSION INTRAMUSCULAR ONCE
Refills: 0 | Status: COMPLETED | OUTPATIENT
Start: 2022-09-09 | End: 2022-09-09

## 2022-09-09 RX ORDER — ACETAMINOPHEN 500 MG
650 TABLET ORAL ONCE
Refills: 0 | Status: COMPLETED | OUTPATIENT
Start: 2022-09-09 | End: 2022-09-09

## 2022-09-09 RX ADMIN — Medication 650 MILLIGRAM(S): at 11:50

## 2022-09-09 RX ADMIN — TETANUS TOXOID, REDUCED DIPHTHERIA TOXOID AND ACELLULAR PERTUSSIS VACCINE, ADSORBED 0.5 MILLILITER(S): 5; 2.5; 8; 8; 2.5 SUSPENSION INTRAMUSCULAR at 11:50

## 2022-09-09 NOTE — ED STATDOCS - OBJECTIVE STATEMENT
89 year old F with PMH HTN, COPD, glaucoma presenting after a fall. Patient was at ophthalmologist's office. Was transferring from rolling walker to chair when she slid off chair, fell on right side. Injured R knee which was bleeding, cleaned with betadine and bandaged PTA. Also sustained bruising to R elbow which is "sore" and L forearm. Thinks she might have hit back of her head. No LOC, not on AC. Ambulatory after. Lives alone, independent in her ADLs. Unsure of last tetanus. Denies HA, vomiting, acute vision changes, lightheadedness 89 year old F with PMH HTN, COPD, glaucoma presenting after a fall. Patient was at ophthalmologist's office. Was transferring from rolling walker to chair when she slid off chair, fell on right side. Injured R knee which was bleeding, cleaned with betadine and bandaged PTA. Also sustained bruising to R elbow which is "sore" and L forearm. Thinks she might have hit back of her head. No LOC, not on AC. Ambulatory after. Lives alone, independent in her ADLs. Unsure of last tetanus. Denies HA, vomiting, acute vision changes, lightheadedness, syncope, UE or LE weakness or paresthesias, neck pain.

## 2022-09-09 NOTE — ED STATDOCS - PROGRESS NOTE DETAILS
Mimi Joseph PGY-3: R knee abrasion cleaned, bacitracin applied and wrapped with non adherent gauze and ace wrap for comfort. Mimi Joseph PGY-3: Reassessed patient. Pain well controlled. CT negative as above. To DC with PCP f/u. Mimi Joseph PGY-3: CT with  Head CT: Stable exam. No acute intracranial hemorrhage, extra-axial   collection, vasogenic edema or calvarial fracture. Chronic findings as   above.  Cervical spine CT: Stable exam. No acute cervical spine fracture or   evidence of traumatic malalignment. Cervical spondylosis notable C3/C4   through C5/C6.  Incidental - Stable biapical scarring and emphysema. 7 mm left apical nodule not significantly changed from the prior exam.

## 2022-09-09 NOTE — ED STATDOCS - PATIENT PORTAL LINK FT
You can access the FollowMyHealth Patient Portal offered by [facility] by registering at the following website: [email or web address]. By joining [employer]’s FollowMyHealth portal, you will also be able to view your health information using other applications (apps) compatible with our system.

## 2022-09-09 NOTE — ED ADULT NURSE NOTE - OBJECTIVE STATEMENT
Patient presents to the emergency room with complaints of mechanical fall at the doctors office. Patient alert and oriented, skin tear noted to right knee, wrapped in gauze, bleeding  controlled. Hematoma noted to right elbow. Patient reports pain to right elbow, no deformity noted. Patient - LOC, - headstrike, and -blood thinner.

## 2022-09-09 NOTE — ED ADULT NURSE NOTE - NSIMPLEMENTINTERV_GEN_ALL_ED
Implemented All Fall with Harm Risk Interventions:  [place] to call system. Call bell, personal items and telephone within reach. Instruct patient to call for assistance. Room bathroom lighting operational. Non-slip footwear when patient is off stretcher. Physically safe environment: no spills, clutter or unnecessary equipment. Stretcher in lowest position, wheels locked, appropriate side rails in place. Provide visual cue, wrist band, yellow gown, etc. Monitor gait and stability. Monitor for mental status changes and reorient to person, place, and time. Review medications for side effects contributing to fall risk. Reinforce activity limits and safety measures with patient and family. Provide visual clues: red socks.

## 2022-09-09 NOTE — ED ADULT TRIAGE NOTE - CHIEF COMPLAINT QUOTE
Pt presents to the ED s/p mechanical fall at the doctors office. Abrasion noted to right knee, gauze in place and bleeding controlled. Hematoma noted to right elbow. Pt reports pain to right elbow, no deformity noted. Denies LOC, headstrike, or blood thinner use.

## 2022-09-09 NOTE — ED STATDOCS - CLINICAL SUMMARY MEDICAL DECISION MAKING FREE TEXT BOX
Mimi Joseph PGY-3: 89 year old F with PMH HTN, COPD, glaucoma presenting after a mechanical fall. VSS, afebrile, well appearing. AOx3, GCS 15. Head trauma but no LOC, not on AC, no FND on exam. Superficial abrasion inferior to R patella, medial patellar tenderness. Ecchymoses to R elbow, L forearm. Fall purely mechanical, low suspicion for cardiogenic, vasovagal or orthostatic etiology for fall. Low suspicion for acute epidural hematoma or subdural hematoma, however given age and report of head trauma, will get CT head. XR R knee and R elbow to r/o fracture. Clean and dress wound, tetanus, Tylenol. Reassess.

## 2022-09-09 NOTE — ED STATDOCS - NSFOLLOWUPINSTRUCTIONS_ED_ALL_ED_FT
You were seen in the emergency department after a fall. Your results are attached. Please follow up with your primary care doctor.     Please keep your wound on your knee clean and dry. You can use light soap and water to clean it. Apply bacitracin over the wound and a non adherent gauze pad. Monitor for signs of infection, including high fevers, increased pain, bleeding, pus drainage, red streaking up the leg.    You may take tylenol for pain. You can use 500-1000mg Tylenol every 6 hours for pain - as needed.  This is an over-the-counter medication - please respect the warnings on the label. This medication comes with certain risks and side effects that you need to discuss with your doctor, especially if you are taking it for a prolonged period.    Please return to the emergency department with any new or worsening symptoms, including but not limited to:  -Severe headache  -Intractable nausea and vomiting  -Vision changes or vision loss  -Neck pain  -Numbness, tingling, weakness in the arms or legs  -You cannot walk  -You fall again  -You are confused or faint  -Seizure like activity       Fall Prevention    WHAT YOU NEED TO KNOW:    Fall prevention includes ways to make your home and other areas safer. It also includes ways you can move more carefully to prevent a fall. Health conditions that cause changes in your blood pressure, vision, or muscle strength and coordination may increase your risk for falls. Medicines may also increase your risk for falls if they make you dizzy, weak, or sleepy.    DISCHARGE INSTRUCTIONS:    Call 911 or have someone else call if:  You have fallen and are unconscious.  You have fallen and cannot move part of your body.  Contact your healthcare provider if:  You have fallen and have pain or a headache.  You have questions or concerns about your condition or care.  Fall prevention tips:  Stand or sit up slowly. This may help you keep your balance and prevent falls.  Use assistive devices as directed. Your healthcare provider may suggest that you use a cane or walker to help you keep your balance. You may need to have grab bars put in your bathroom near the toilet or in the shower.  Wear shoes that fit well and have soles that . Wear shoes both inside and outside. Use slippers with good . Do not wear shoes with high heels.  Wear a personal alarm. This is a device that allows you to call 911 if you fall and need help. Ask your healthcare provider for more information.  Stay active. Exercise can help strengthen your muscles and improve your balance. Your healthcare provider may recommend water aerobics or walking. He or she may also recommend physical therapy to improve your coordination. Never start an exercise program without talking to your healthcare provider first.  Walking for Exercise  Manage your medical conditions. Keep all appointments with your healthcare providers. Visit your eye doctor as directed.  Home safety tips:  Fall Prevention for Adults  Add items to prevent falls in the bathroom. Put nonslip strips on your bath or shower floor to prevent you from slipping. Use a bath mat if you do not have carpet in the bathroom. This will prevent you from falling when you step out of the bath or shower. Use a shower seat so you do not need to stand while you shower. Sit on the toilet or a chair in your bathroom to dry yourself and put on clothing. This will prevent you from losing your balance from drying or dressing yourself while you are standing.  Keep paths clear. Remove books, shoes, and other objects from walkways and stairs. Place cords for telephones and lamps out of the way so that you do not need to walk over them. Tape them down if you cannot move them. Remove small rugs. If you cannot remove a rug, secure it with double-sided tape. This will prevent you from tripping.  Install bright lights in your home. Use night lights to help light paths to the bathroom or kitchen. Always turn on the light before you start walking.  Keep items you use often on shelves within reach. Do not use a step stool to help you reach an item.  Paint or place reflective tape on the edges of your stairs. This will help you see the stairs better.  Follow up with your healthcare provider as directed: Write down your questions so you remember to ask them during your visits.

## 2022-09-09 NOTE — ED STATDOCS - NS ED ROS FT
CONST: no fevers, no chills  EYES: no pain, no vision changes  ENT: no sore throat, no ear pain, no change in hearing  CV: no chest pain, no leg swelling  RESP: no shortness of breath, no cough  ABD: no abdominal pain, no nausea, no vomiting, no diarrhea  : no dysuria, no flank pain, no hematuria  MSK: no back pain, +R knee pain  NEURO: no headache or additional neurologic complaints  HEME: +R elbow bruise, +L forearm bruise  SKIN:  no rash, +R knee abrasion

## 2022-09-09 NOTE — ED STATDOCS - PHYSICAL EXAMINATION
GENERAL: Awake, alert, NAD  HEENT: NC/AT, moist mucous membranes, PERRL, EOMI  LUNGS: CTAB, no wheezes or crackles   CARDIAC: RRR, no m/r/g  ABDOMEN: Soft, normal BS, non tender, non distended, no rebound, no guarding  BACK: No midline spinal tenderness, no CVA tenderness  EXT: No edema, no calf tenderness, 2+ DP pulses bilaterally. RLE - medial patella TTP, no crepitus, full ROM, distally neurovascularly intact, superficial abrasion inferior to patella, no joint laxity. RUE - large ecchymosis R elbow, full ROM at joint, no tenderness, no deformities, distally neurovascularly intact. LUE - ecchymosis lateral forearm, no point tenderness, distally neurovascularly intact.   NEURO: A&Ox3, GCS 15 . Moving all extremities. 5/5 strength UE and LE bilaterally, sensation intact. CN III-XII grossly intact.   SKIN: Warm and dry. No rash.  PSYCH: Normal affect.

## 2022-09-09 NOTE — ED STATDOCS - ATTENDING CONTRIBUTION TO CARE
I, Yan Alfonso MD, personally saw the patient with ACP.  I have personally performed a face-to-face diagnostic evaluation on this patient. I have reviewed the ACP note and agree with the history, exam, and plan of care, except as noted. I personally saw the patient and performed a substantive portion of the visit including all aspects of the medical decision making. I, Yan Alfonso MD, personally saw the patient with resident. I have personally performed a face-to-face diagnostic evaluation on this patient. I have reviewed the resident note and agree with the history, exam, and plan of care, except as noted. I personally saw the patient and performed a substantive portion of the visit including all aspects of the medical decision making.

## 2023-01-25 ENCOUNTER — EMERGENCY (EMERGENCY)
Facility: HOSPITAL | Age: 88
LOS: 0 days | Discharge: HOME | End: 2023-01-26
Attending: EMERGENCY MEDICINE
Payer: MEDICARE

## 2023-01-25 VITALS
DIASTOLIC BLOOD PRESSURE: 70 MMHG | SYSTOLIC BLOOD PRESSURE: 131 MMHG | WEIGHT: 85.1 LBS | HEART RATE: 60 BPM | RESPIRATION RATE: 18 BRPM | OXYGEN SATURATION: 100 % | HEIGHT: 60 IN | TEMPERATURE: 99 F

## 2023-01-25 DIAGNOSIS — S01.01XA LACERATION WITHOUT FOREIGN BODY OF SCALP, INITIAL ENCOUNTER: ICD-10-CM

## 2023-01-25 DIAGNOSIS — M19.90 UNSPECIFIED OSTEOARTHRITIS, UNSPECIFIED SITE: ICD-10-CM

## 2023-01-25 DIAGNOSIS — J44.9 CHRONIC OBSTRUCTIVE PULMONARY DISEASE, UNSPECIFIED: ICD-10-CM

## 2023-01-25 DIAGNOSIS — Z88.5 ALLERGY STATUS TO NARCOTIC AGENT: ICD-10-CM

## 2023-01-25 DIAGNOSIS — I73.9 PERIPHERAL VASCULAR DISEASE, UNSPECIFIED: ICD-10-CM
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
## 2023-01-25 DIAGNOSIS — I10 ESSENTIAL (PRIMARY) HYPERTENSION: ICD-10-CM

## 2023-01-25 DIAGNOSIS — Z88.2 ALLERGY STATUS TO SULFONAMIDES: ICD-10-CM
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
## 2023-01-25 DIAGNOSIS — G25.0 ESSENTIAL TREMOR: ICD-10-CM

## 2023-01-25 PROCEDURE — 72125 CT NECK SPINE W/O DYE: CPT | Mod: MA

## 2023-01-25 PROCEDURE — 73562 X-RAY EXAM OF KNEE 3: CPT | Mod: LT

## 2023-01-25 PROCEDURE — 72125 CT NECK SPINE W/O DYE: CPT | Mod: 26,MA

## 2023-01-25 PROCEDURE — 73562 X-RAY EXAM OF KNEE 3: CPT | Mod: 26,LT

## 2023-01-25 PROCEDURE — 70450 CT HEAD/BRAIN W/O DYE: CPT | Mod: 26,MA

## 2023-01-25 PROCEDURE — 99284 EMERGENCY DEPT VISIT MOD MDM: CPT | Mod: 25

## 2023-01-25 PROCEDURE — 97116 GAIT TRAINING THERAPY: CPT | Mod: GP

## 2023-01-25 PROCEDURE — 99285 EMERGENCY DEPT VISIT HI MDM: CPT | Mod: 25

## 2023-01-25 PROCEDURE — 70450 CT HEAD/BRAIN W/O DYE: CPT | Mod: MA

## 2023-01-25 PROCEDURE — 12001 RPR S/N/AX/GEN/TRNK 2.5CM/<: CPT

## 2023-01-25 PROCEDURE — 97162 PT EVAL MOD COMPLEX 30 MIN: CPT | Mod: GP

## 2023-01-25 RX ORDER — ACETAMINOPHEN 500 MG
650 TABLET ORAL ONCE
Refills: 0 | Status: COMPLETED | OUTPATIENT
Start: 2023-01-25 | End: 2023-01-25

## 2023-01-25 RX ADMIN — Medication 650 MILLIGRAM(S): at 20:24

## 2023-01-25 NOTE — ED ADULT NURSE NOTE - OBJECTIVE STATEMENT
PT presents to ED A&OX4 status post mechanical fall with 1cm laceration to top of head. PT states she got her foot stuck in heavy pile rug, tripped forward and hit head on the fireplace. PT denies use of blood thinners, denies LOC, reports mild headache and resolved dizziness. PT skin color appropriate for race, respirations even and unlabored. Neuro alert called, medicated as per orders. Will continue to monitor. Chris Marshall RN

## 2023-01-25 NOTE — ED PROVIDER NOTE - PATIENT PORTAL LINK FT
You can access the FollowMyHealth Patient Portal offered by [facility] by registering at the following website: [email or web address]. By joining [facility]’s FollowMyHealth portal, you will also be able to view your health information using other applications (apps) compatible with our system.

## 2023-01-25 NOTE — ED ADULT TRIAGE NOTE - CHIEF COMPLAINT QUOTE
pt BIBEMS from home s/p unwitnessed fall. pt lives home alone, a&ox4. denies LOC. laceration noted to top of head. no blood thinners. pt with c/o headache and some dizziness. no acute distress noted. MD Hanna aware, neuro alert called.

## 2023-01-25 NOTE — ED PROVIDER NOTE - OBJECTIVE STATEMENT
89 y/o F of PMHx of PVD, hyperglycemia, COPD, glaucoma, OA, benign essential tumor, and HTN presents to the ED BIB EMS s/p unwitnessed fall with head strike. pt states she tripped and struck head against the fireplace. pt uses walker when outside of home, but walks unassisted at home.

## 2023-01-25 NOTE — ED PROVIDER NOTE - PROGRESS NOTE DETAILS
Kaden: patient endorsed to me from sign-out. CT negative for traumatic injury. At her baseline neuro status. cleared for discharge. Patient will be picked up by aide.

## 2023-01-25 NOTE — ED PROVIDER NOTE - NSFOLLOWUPINSTRUCTIONS_ED_ALL_ED_FT
You were seen in the Emergency Department for fall. Your CT was negative for traumatic injury. There was an incidental lung nodule that you should follow up for.    - Please follow up with your Primary Care Doctor in 2-3 days.    - Be sure to return to the ED if you develop new or worsening symptoms.     - Specific signs and symptoms to be vigilant of: lightheadedness, dizziness, vertigo, headache, vision changes, slurring of the speech, facial droop, difficulty swallowing, numbness or tingling, muscle weakness, changes in sensation, difficulty walking.

## 2023-01-25 NOTE — ED PROVIDER NOTE - CLINICAL SUMMARY MEDICAL DECISION MAKING FREE TEXT BOX
as per previous attending Patient with skin tear left knee, cleansed and dressed.  Scalp laceration repaired in ED with staples, discussed with patient need for removal in 1 week.  Patient kept overnight in ED 2/2 lives alone, and fall at home.  SW and PT evaluation in morning.  Patient discharge home in good condition.  Return precautions given.

## 2023-01-26 VITALS
RESPIRATION RATE: 18 BRPM | SYSTOLIC BLOOD PRESSURE: 137 MMHG | HEART RATE: 62 BPM | TEMPERATURE: 98 F | DIASTOLIC BLOOD PRESSURE: 43 MMHG | OXYGEN SATURATION: 96 %

## 2023-01-26 RX ORDER — METOPROLOL TARTRATE 50 MG
75 TABLET ORAL ONCE
Refills: 0 | Status: COMPLETED | OUTPATIENT
Start: 2023-01-26 | End: 2023-01-26

## 2023-01-26 RX ADMIN — Medication 75 MILLIGRAM(S): at 09:31

## 2023-01-26 NOTE — PHYSICAL THERAPY INITIAL EVALUATION ADULT - PERTINENT HX OF CURRENT PROBLEM, REHAB EVAL
The patient is a 89y Female complaining of fall. PMHx of PVD, hyperglycemia, COPD, glaucoma, OA, benign essential tumor, and HTN presents to the ED BIB EMS s/p unwitnessed fall with head strike. pt states she tripped and struck head against the fireplace. pt uses walker when outside of home, but walks unassisted at home. The patient is a 89y Female complaining of fall. PMHx of PVD, hyperglycemia, COPD, glaucoma, OA, benign essential tumor, and HTN presents to the ED BIB EMS s/p unwitnessed fall with head strike. pt states she tripped and struck head against the fireplace. pt uses walker when outside of home, but walks unassisted at home. Head CT: No CT evidence of acute intracranial hemorrhage. CT spine(-)

## 2023-01-26 NOTE — ED ADULT NURSE REASSESSMENT NOTE - NS ED NURSE REASSESS COMMENT FT1
pt ambulated to bathroom with safe and steady gait. 1 assist. food order placed for pt. pending PT consult.

## 2023-01-26 NOTE — CHART NOTE - NSCHARTNOTEFT_GEN_A_CORE
SW consulted to assist with safe  discharge plan.    Pt is a 89 yr old English speaking female with a PMHx of PVD, hyperglycemia, COPD, glaucoma, OA, benign essential tumor, and HTN. Pt presents BIBA s/p unwitnessed fall with head strike. Pt reports she tripped on the thick rug and struck her head on the fireplace. Pt medically cleared for discharge pending safe plan.  SW met with pt at chairside, reviewed role of SW/CM as well as community resources. . Pt is A&Ox4, easily engaged. Facesheet demographics verified as correct, with pt adding her nickname as "Emperatriz." Pt reports Pt resides alone in a ranch style home, [address] w/railing. Pt no longer goes downstairs to basement. Pt reports she has a private hire aide 2 times per week to assist with shopping, MD appts, cleaning, laundry, and some personal bathing. At baseline, pt reports she ambulates independently within the home, and with rollator when outside. Pt reports she is independent in ADLs with minimum assist during bathing.   Pt's PCP is Dr. Clemente, and pharmacy is [facility] in [place]. Pt received J&J covid vaccine 6/23/21, no boosters.  Pt reports she is currently on service with [facility] [place] for leg care. Pt reports her private aide, Angela [phone], may be available upon discharge to provide transport and remain with her. Per pt, aide would also be available for more care hours if pt feels she needs more help. SW will continue to follow for safe discharge plan.

## 2023-01-26 NOTE — PHYSICAL THERAPY INITIAL EVALUATION ADULT - ADDITIONAL COMMENTS
Pt. lives alone, aide only 2 days a week; safety discharge planning Pt. lives alone, aide only 2 days a week for 3 hr.. Pt. has 2 steps + HR amb with rollator

## 2023-01-26 NOTE — PHYSICAL THERAPY INITIAL EVALUATION ADULT - GENERAL OBSERVATIONS, REHAB EVAL
Pt. received on recliner chair in ED agreeable to PT. SBA for sit to stand to RW and amb with SBA/ CG A/ close supervision 300 ft. Pt. is at PLOF however, is at risk for fall due to advance age. PT. spoke with RN, with recommendation of HHA for safety or family to decide to have her in [facility].

## 2023-04-14 ENCOUNTER — APPOINTMENT (OUTPATIENT)
Dept: VASCULAR SURGERY | Facility: CLINIC | Age: 88
End: 2023-04-14